# Patient Record
Sex: FEMALE | Race: WHITE | NOT HISPANIC OR LATINO | Employment: UNEMPLOYED | URBAN - METROPOLITAN AREA
[De-identification: names, ages, dates, MRNs, and addresses within clinical notes are randomized per-mention and may not be internally consistent; named-entity substitution may affect disease eponyms.]

---

## 2017-01-17 ENCOUNTER — HOSPITAL ENCOUNTER (EMERGENCY)
Facility: HOSPITAL | Age: 22
Discharge: HOME | End: 2017-01-17
Attending: EMERGENCY MEDICINE
Payer: MEDICAID

## 2017-01-17 VITALS
HEART RATE: 95 BPM | BODY MASS INDEX: 32.78 KG/M2 | RESPIRATION RATE: 14 BRPM | SYSTOLIC BLOOD PRESSURE: 119 MMHG | TEMPERATURE: 99 F | DIASTOLIC BLOOD PRESSURE: 81 MMHG | HEIGHT: 63 IN | OXYGEN SATURATION: 100 % | WEIGHT: 185 LBS

## 2017-01-17 DIAGNOSIS — R07.89 CHEST WALL PAIN: Primary | ICD-10-CM

## 2017-01-17 DIAGNOSIS — M62.838 MUSCLE SPASM: ICD-10-CM

## 2017-01-17 PROCEDURE — 99284 EMERGENCY DEPT VISIT MOD MDM: CPT

## 2017-01-17 RX ORDER — ASCORBIC ACID 500 MG
500 TABLET ORAL DAILY
COMMUNITY
End: 2017-09-12

## 2017-01-17 RX ORDER — CYCLOBENZAPRINE HCL 10 MG
10 TABLET ORAL 3 TIMES DAILY PRN
Qty: 20 TABLET | Refills: 0 | Status: SHIPPED | OUTPATIENT
Start: 2017-01-17 | End: 2017-01-22

## 2017-01-17 RX ORDER — FOLIC ACID 0.8 MG
800 TABLET ORAL DAILY
COMMUNITY
End: 2018-07-27 | Stop reason: ALTCHOICE

## 2017-01-17 RX ORDER — DICLOFENAC SODIUM 30 MG/G
GEL TOPICAL
Qty: 100 G | Refills: 0 | Status: SHIPPED | OUTPATIENT
Start: 2017-01-17 | End: 2017-09-12

## 2017-01-17 RX ORDER — ETODOLAC 300 MG/1
300 CAPSULE ORAL EVERY 8 HOURS PRN
Qty: 30 CAPSULE | Refills: 0 | Status: SHIPPED | OUTPATIENT
Start: 2017-01-17 | End: 2017-09-12

## 2017-01-17 NOTE — ED PROVIDER NOTES
"Encounter Date: 1/17/2017    SCRIBE #1 NOTE: IDiana, am scribing for, and in the presence of, Dr. Hill.       History     Chief Complaint   Patient presents with    Shoulder Pain     Pain R axilla, shoulder since last pm. Denies trauma.     Review of patient's allergies indicates:   Allergen Reactions    Tramadol Other (See Comments)     seizures     HPI Comments: 01/17/2017  5:16 PM     Chief Complaint: Right Shoulder Pain and Right Sided CP      The patient is a 21 y.o. female with a PMHx of asthma and seizures who is presenting via EMS with an acute onset of right shoulder pain and right sided CP that started last night. Her pain is exacerbated with raising her right arm up high. She reported applying an ice pack, without any alleviation. Associated symptom of SOB. Her boyfriend's initial comment was "hey we were about to have sex" upon entering the pt room. Pt has a past surgical history that includes Adenoidectomy w/ myringotomy and tubes.      The history is provided by the patient and a significant other.     Past Medical History   Diagnosis Date    Asthma     Seizures      Epilepsy     No past medical history pertinent negatives.  Past Surgical History   Procedure Laterality Date    Adenoidectomy w/ myringotomy and tubes       Family History   Problem Relation Age of Onset    Heart disease Father     Heart disease Maternal Grandfather     Heart disease Paternal Grandmother     Heart disease Paternal Grandfather      Social History   Substance Use Topics    Smoking status: Never Smoker    Smokeless tobacco: None    Alcohol use No      Comment: rarely     Review of Systems   Constitutional: Negative for fever.   HENT: Negative for sore throat.    Eyes: Negative for visual disturbance.   Respiratory: Positive for shortness of breath.    Cardiovascular: Positive for chest pain (Right sided CP.).   Gastrointestinal: Negative for nausea.   Genitourinary: Negative for dysuria. "   Musculoskeletal: Positive for arthralgias (Right shoulder pain.).   Skin: Negative for rash.   Neurological: Negative for weakness.   Hematological: Does not bruise/bleed easily.   Psychiatric/Behavioral: Negative for confusion.       Physical Exam   Initial Vitals   BP Pulse Resp Temp SpO2   01/17/17 1652 01/17/17 1652 01/17/17 1652 01/17/17 1652 01/17/17 1652   119/81 95 14 98.7 °F (37.1 °C) 100 %     Physical Exam    Nursing note and vitals reviewed.  Constitutional: She appears well-developed and well-nourished.   HENT:   Head: Normocephalic and atraumatic.   Eyes: Conjunctivae are normal.   Neck: Neck supple.   Cardiovascular: Normal rate, regular rhythm, normal heart sounds and intact distal pulses. Exam reveals no gallop and no friction rub.    No murmur heard.  Pulmonary/Chest: Breath sounds normal. No respiratory distress. She has no wheezes. She has no rhonchi. She has no rales. She exhibits tenderness.   Right upper quadrant chest wall TTP, reproducible, with muscle spasms.   Musculoskeletal:   Deltopectoral groove tenderness.  Pain with right shoulder flexion past 90 degrees, abduction to 90 degrees, and extreme extension of right shoulder.  No tenderness to actual right shoulder.   Neurological: She is alert and oriented to person, place, and time.   Skin: No rash noted. No erythema.   Psychiatric: She has a normal mood and affect.         ED Course   Procedures  Labs Reviewed - No data to display                     Scribe Attestation:   Scribe #1: I performed the above scribed service and the documentation accurately describes the services I performed. I attest to the accuracy of the note.    Attending Attestation:           Physician Attestation for Scribe:  Physician Attestation Statement for Scribe #1: I, Dr. Hill, reviewed documentation, as scribed by Diana Cm in my presence, and it is both accurate and complete.         Attending ED Notes:   This is an emergent evaluation of a 21  y.o.female patient with presentation of right shoulder pain/CP consistent with muscle spasm.   Plan: NSAID, muscle relaxer, heat, and discharge.    At the present time, I do not believe the patient has an emergent condition requiring further emergent workup or admission to the hospital at this time. The pt is stable for discharge. I doubt pneumothorax, pleurisy, PNA, and ACS. Pt is PERC negative. Clinical impression/plan and rx discussed with patient. Return precautions and after care instructions given. Pt is to follow up with referred physician in 3 days or return to the ED for any concerns. The patient expressed understanding.            ED Course     Clinical Impression:   The primary encounter diagnosis was Chest wall pain. A diagnosis of Muscle spasm was also pertinent to this visit.          Alexandr Hill MD  01/17/17 [id number]

## 2017-01-17 NOTE — ED AVS SNAPSHOT
[facility] [address] [place] [address]               Syeda Gonzalez   2017  5:06 PM   ED    Description:  Female : 1995   Department:  [facility]           Your Care was Coordinated By:     Provider Role From To    Alexandr Hill MD Attending Provider 17 1087 --      Reason for Visit     Shoulder Pain           Diagnoses this Visit        Comments    Chest wall pain    -  Primary     Muscle spasm           ED Disposition     None           To Do List           Follow-up Information     Follow up with [facility]. Call in 3 days.    Specialty:  Family Medicine    Why:  As needed    Contact information:    [address] [place] [id number] [phone]       These Medications        Disp Refills Start End    diclofenac sodium (SOLARAZE) 3 % gel 100 g 0 2017     Apply to affected area twice daily    Pharmacy: [facility] #[id number] - [address] Ph #: [phone]       etodolac (LODINE) 300 MG Cap 30 capsule 0 2017     Take 1 capsule (300 mg total) by mouth every 8 (eight) hours as needed (pain). - Oral    Pharmacy: [facility]/pharmacy #[id number] - [address] Ph #: [phone]       cyclobenzaprine (FLEXERIL) 10 MG tablet 20 tablet 0 2017    Take 1 tablet (10 mg total) by mouth 3 (three) times daily as needed for Muscle spasms. - Oral    Pharmacy: [facility]/pharmacy #[id number] - [address] Ph #: [phone]         [facility]     [facility] -  Assistance  Registered nurses in the [facility] provide clinical advisement, health education, appointment booking, and other advisory services.  Call for this free service at [phone].             Medications           Message regarding Medications     Verify the changes and/or additions to your medication regime listed below are the same as  "discussed with your clinician today.  If any of these changes or additions are incorrect, please notify your healthcare provider.        START taking these NEW medications        Refills    diclofenac sodium (SOLARAZE) 3 % gel 0    Sig: Apply to affected area twice daily    Class: Print    etodolac (LODINE) 300 MG Cap 0    Sig: Take 1 capsule (300 mg total) by mouth every 8 (eight) hours as needed (pain).    Class: Print    Route: Oral    cyclobenzaprine (FLEXERIL) 10 MG tablet 0    Sig: Take 1 tablet (10 mg total) by mouth 3 (three) times daily as needed for Muscle spasms.    Class: Print    Route: Oral           Verify that the below list of medications is an accurate representation of the medications you are currently taking.  If none reported, the list may be blank. If incorrect, please contact your healthcare provider. Carry this list with you in case of emergency.           Current Medications     ascorbic acid, vitamin C, (VITAMIN C) 500 MG tablet Take 500 mg by mouth once daily.    folic acid (FOLVITE) 800 MCG Tab Take 800 mcg by mouth once daily.    melatonin 1 mg Tab Take by mouth.    zonisamide (ZONEGRAN) 100 MG Cap Take 3 capsules (300 mg total) by mouth every evening. 3 caps PO nightly    cyclobenzaprine (FLEXERIL) 10 MG tablet Take 1 tablet (10 mg total) by mouth 3 (three) times daily as needed for Muscle spasms.    diclofenac sodium (SOLARAZE) 3 % gel Apply to affected area twice daily    etodolac (LODINE) 300 MG Cap Take 1 capsule (300 mg total) by mouth every 8 (eight) hours as needed (pain).           Clinical Reference Information           Your Vitals Were     BP Pulse Temp Resp Height Weight    119/81 (BP Location: Right arm, Patient Position: Sitting) 95 98.7 °F (37.1 °C) (Oral) 14 5' 3" (1.6 m) 83.9 kg (185 lb)    Last Period SpO2 BMI          01/03/2017 100% 32.77 kg/m2        Allergies as of 1/17/2017        Reactions    Tramadol Other (See Comments)    seizures      Immunizations " Administered on Date of Encounter - 1/17/2017     None      ED Micro, Lab, POCT     None      ED Imaging Orders     None        Discharge Instructions         Muscle Spasm  A muscle spasm (also called a cramp) is an involuntary muscle contraction. The muscle tightens quickly and strongly. A hard lump may form in the muscle. Muscle spasms are very painful. Read on to learn more about muscle spasms and how to treat and prevent them.    What causes muscles to spasm?  Often, the cause of a muscle spasm is not known. Muscle spasm is due to irritation of muscle fibers. Some things can make a muscle spasm more likely. These include:  · Injury  · Heavy exercise  · Overtired muscles  · A muscle held in one position for a long time  · Dehydration  · Low levels of certain minerals in the body  · Taking certain medications, such as diuretics or water pills  · Certain medical conditions, such as kidney failure or diabetes  · Being pregnant  Stopping a muscle spasm  Muscle spasms often come and go quickly. When a muscle goes into spasm, very gently stretch and massage the muscle. This may help calm the muscle fibers. Then rest the muscle.  Preventing muscle spasms  Although there is little or no evidence that staying hydrated, taking certain vitamins or minerals or stretching works to prevent cramps, these measures may help and have other benefits. Talk to your health care provider about steps to take to avoid muscle spasms. These may include:  · Drinking enough fluids to avoid dehydration, especially when you exercise.  · Taking vitamin or mineral supplements.  · Getting regular exercise.  · Stretching regularly, especially before exercise.  · Limit caffeine and smoking.  · Taking a prescription muscle relaxant.  When to call your doctor  Call your doctor if you have any of the following:  · Severe cramping  · Cramping that lasts a long time, does not go away with stretching, or keeps coming back  · Pain, tingling, or weakness  in the arms or legs  · Pain that wakes you up at night   © 4074-9480 [employer]. [address] [place] [address], [place] [address]. All rights reserved. This information is not intended as a substitute for professional medical care. Always follow your healthcare professional's instructions.          Your Scheduled Appointments     Feb 17, 2017 10:40 AM CST   Established Patient Visit with Drake Ruff Jr., MD   [place] [address] [facility] [address] [place] [id number] [phone]              [facility] Sign-Up     Activating your [facility] account is as easy as 1-2-3!     1) Visit [email or web address], select Sign Up Now, enter this activation code and your date of birth, then select Next.  [email or web address]  Expires: 3/3/2017  5:24 PM      2) Create a username and password to use when you visit [facility] in the future and select a security question in case you lose your password and select Next.    3) Enter your e-mail address and click Sign Up!    Additional Information  If you have questions, please e-mail [email or web address] or call [phone] to talk to our [facility] staff. Remember, [facility] is NOT to be used for urgent needs. For medical emergencies, dial 911.          [facility] complies with applicable Federal civil rights laws and does not discriminate on the basis of race, color, national origin, age, disability, or sex.        Language Assistance Services     ATTENTION: Language assistance services are available, free of charge. Please call [phone].      ATENCIÓN: Si habla español, tiene a vela disposición servicios gratuitos de asistencia lingüística. Llame al [phone].     CHÚ Ý: N?u b?n nói Ti?ng Vi?t, có các d?ch v? h? tr? ngôn ng? mi?n phí dành cho b?n. G?i s? [phone].

## 2017-01-17 NOTE — DISCHARGE INSTRUCTIONS
Muscle Spasm  A muscle spasm (also called a cramp) is an involuntary muscle contraction. The muscle tightens quickly and strongly. A hard lump may form in the muscle. Muscle spasms are very painful. Read on to learn more about muscle spasms and how to treat and prevent them.    What causes muscles to spasm?  Often, the cause of a muscle spasm is not known. Muscle spasm is due to irritation of muscle fibers. Some things can make a muscle spasm more likely. These include:  · Injury  · Heavy exercise  · Overtired muscles  · A muscle held in one position for a long time  · Dehydration  · Low levels of certain minerals in the body  · Taking certain medications, such as diuretics or water pills  · Certain medical conditions, such as kidney failure or diabetes  · Being pregnant  Stopping a muscle spasm  Muscle spasms often come and go quickly. When a muscle goes into spasm, very gently stretch and massage the muscle. This may help calm the muscle fibers. Then rest the muscle.  Preventing muscle spasms  Although there is little or no evidence that staying hydrated, taking certain vitamins or minerals or stretching works to prevent cramps, these measures may help and have other benefits. Talk to your health care provider about steps to take to avoid muscle spasms. These may include:  · Drinking enough fluids to avoid dehydration, especially when you exercise.  · Taking vitamin or mineral supplements.  · Getting regular exercise.  · Stretching regularly, especially before exercise.  · Limit caffeine and smoking.  · Taking a prescription muscle relaxant.  When to call your doctor  Call your doctor if you have any of the following:  · Severe cramping  · Cramping that lasts a long time, does not go away with stretching, or keeps coming back  · Pain, tingling, or weakness in the arms or legs  · Pain that wakes you up at night   © 8544-8004 [employer]. [address] [place] [address], [place] [address]. All rights  reserved. This information is not intended as a substitute for professional medical care. Always follow your healthcare professional's instructions.

## 2017-05-12 ENCOUNTER — HOSPITAL ENCOUNTER (EMERGENCY)
Facility: HOSPITAL | Age: 22
Discharge: HOME | End: 2017-05-12
Attending: EMERGENCY MEDICINE
Payer: MEDICAID

## 2017-05-12 VITALS
RESPIRATION RATE: 18 BRPM | DIASTOLIC BLOOD PRESSURE: 75 MMHG | HEART RATE: 85 BPM | TEMPERATURE: 97 F | HEIGHT: 63 IN | WEIGHT: 180 LBS | OXYGEN SATURATION: 100 % | BODY MASS INDEX: 31.89 KG/M2 | SYSTOLIC BLOOD PRESSURE: 121 MMHG

## 2017-05-12 DIAGNOSIS — K21.9 GASTROESOPHAGEAL REFLUX DISEASE, ESOPHAGITIS PRESENCE NOT SPECIFIED: ICD-10-CM

## 2017-05-12 DIAGNOSIS — R10.13 EPIGASTRIC PAIN: Primary | ICD-10-CM

## 2017-05-12 DIAGNOSIS — D64.9 ANEMIA, UNSPECIFIED: ICD-10-CM

## 2017-05-12 LAB
ALBUMIN SERPL BCP-MCNC: 3.7 G/DL
ALP SERPL-CCNC: 76 U/L
ALT SERPL W/O P-5'-P-CCNC: 17 U/L
ANION GAP SERPL CALC-SCNC: 8 MMOL/L
AST SERPL-CCNC: 17 U/L
B-HCG UR QL: NEGATIVE
BASOPHILS # BLD AUTO: 0 K/UL
BASOPHILS NFR BLD: 0.4 %
BILIRUB SERPL-MCNC: 0.4 MG/DL
BILIRUB UR QL STRIP: NEGATIVE
BUN SERPL-MCNC: 13 MG/DL
CALCIUM SERPL-MCNC: 8.9 MG/DL
CHLORIDE SERPL-SCNC: 111 MMOL/L
CLARITY UR: CLEAR
CO2 SERPL-SCNC: 22 MMOL/L
COLOR UR: YELLOW
CREAT SERPL-MCNC: 0.7 MG/DL
CTP QC/QA: YES
DIFFERENTIAL METHOD: ABNORMAL
EOSINOPHIL # BLD AUTO: 0.2 K/UL
EOSINOPHIL NFR BLD: 3.1 %
ERYTHROCYTE [DISTWIDTH] IN BLOOD BY AUTOMATED COUNT: 13.1 %
EST. GFR  (AFRICAN AMERICAN): >60 ML/MIN/1.73 M^2
EST. GFR  (NON AFRICAN AMERICAN): >60 ML/MIN/1.73 M^2
GLUCOSE SERPL-MCNC: 94 MG/DL
GLUCOSE UR QL STRIP: NEGATIVE
HCT VFR BLD AUTO: 35.2 %
HGB BLD-MCNC: 11.8 G/DL
HGB UR QL STRIP: NEGATIVE
KETONES UR QL STRIP: NEGATIVE
LEUKOCYTE ESTERASE UR QL STRIP: NEGATIVE
LIPASE SERPL-CCNC: 36 U/L
LYMPHOCYTES # BLD AUTO: 2 K/UL
LYMPHOCYTES NFR BLD: 29.2 %
MCH RBC QN AUTO: 28.3 PG
MCHC RBC AUTO-ENTMCNC: 33.5 %
MCV RBC AUTO: 85 FL
MONOCYTES # BLD AUTO: 0.7 K/UL
MONOCYTES NFR BLD: 9.8 %
NEUTROPHILS # BLD AUTO: 3.8 K/UL
NEUTROPHILS NFR BLD: 57.5 %
NITRITE UR QL STRIP: NEGATIVE
PH UR STRIP: 7 [PH] (ref 5–8)
PLATELET # BLD AUTO: 288 K/UL
PMV BLD AUTO: 8.1 FL
POTASSIUM SERPL-SCNC: 3.9 MMOL/L
PROT SERPL-MCNC: 6.8 G/DL
PROT UR QL STRIP: NEGATIVE
RBC # BLD AUTO: 4.16 M/UL
SODIUM SERPL-SCNC: 141 MMOL/L
SP GR UR STRIP: 1.01 (ref 1–1.03)
URN SPEC COLLECT METH UR: NORMAL
UROBILINOGEN UR STRIP-ACNC: NEGATIVE EU/DL
WBC # BLD AUTO: 6.7 K/UL

## 2017-05-12 PROCEDURE — 80053 COMPREHEN METABOLIC PANEL: CPT

## 2017-05-12 PROCEDURE — 25000003 PHARM REV CODE 250: Performed by: EMERGENCY MEDICINE

## 2017-05-12 PROCEDURE — 85025 COMPLETE CBC W/AUTO DIFF WBC: CPT

## 2017-05-12 PROCEDURE — 96374 THER/PROPH/DIAG INJ IV PUSH: CPT

## 2017-05-12 PROCEDURE — 81003 URINALYSIS AUTO W/O SCOPE: CPT

## 2017-05-12 PROCEDURE — 81025 URINE PREGNANCY TEST: CPT | Performed by: EMERGENCY MEDICINE

## 2017-05-12 PROCEDURE — 63600175 PHARM REV CODE 636 W HCPCS: Performed by: EMERGENCY MEDICINE

## 2017-05-12 PROCEDURE — 83690 ASSAY OF LIPASE: CPT

## 2017-05-12 PROCEDURE — 36415 COLL VENOUS BLD VENIPUNCTURE: CPT

## 2017-05-12 PROCEDURE — 99284 EMERGENCY DEPT VISIT MOD MDM: CPT | Mod: 25

## 2017-05-12 RX ORDER — OMEPRAZOLE 20 MG/1
20 CAPSULE, DELAYED RELEASE ORAL DAILY
Qty: 30 CAPSULE | Refills: 2 | Status: SHIPPED | OUTPATIENT
Start: 2017-05-12 | End: 2017-09-12

## 2017-05-12 RX ORDER — ONDANSETRON 2 MG/ML
4 INJECTION INTRAMUSCULAR; INTRAVENOUS
Status: COMPLETED | OUTPATIENT
Start: 2017-05-12 | End: 2017-05-12

## 2017-05-12 RX ORDER — ONDANSETRON 4 MG/1
4 TABLET, FILM COATED ORAL EVERY 8 HOURS PRN
Qty: 12 TABLET | Refills: 0 | Status: SHIPPED | OUTPATIENT
Start: 2017-05-12 | End: 2017-05-16

## 2017-05-12 RX ADMIN — ONDANSETRON 4 MG: 2 INJECTION INTRAMUSCULAR; INTRAVENOUS at 10:05

## 2017-05-12 RX ADMIN — SODIUM CHLORIDE 1000 ML: 0.9 INJECTION, SOLUTION INTRAVENOUS at 10:05

## 2017-05-12 RX ADMIN — LIDOCAINE HYDROCHLORIDE: 20 SOLUTION ORAL; TOPICAL at 10:05

## 2017-05-12 NOTE — DISCHARGE INSTRUCTIONS
Tips to Control Acid Reflux    To control acid reflux, youll need to make some basic diet and lifestyle changes. The simple steps outlined below may be all youll need to ease discomfort.  Watch what you eat  · Avoid fatty foods and spicy foods.  · Eat fewer acidic foods, such as citrus and tomato-based foods. These can increase symptoms.  · Limit drinking alcohol, caffeine, and fizzy beverages. All increase acid reflux.  · Try limiting chocolate, peppermint, and spearmint. These can worsen acid reflux in some people.  Watch when you eat  · Avoid lying down for 3 hours after eating.  · Do not snack before going to bed.  Raise your head  Raising your head and upper body by 4 to 6 inches helps limit reflux when youre lying down. Put blocks under the head of your bed frame to raise it.  Other changes  · Lose weight, if you need to  · Dont exercise near bedtime  · Avoid tight-fitting clothes  · Limit aspirin and ibuprofen  · Stop smoking   Date Last Reviewed: 7/1/2016 © 2000-2016 [employer]. [address] [place] [address]. All rights reserved. This information is not intended as a substitute for professional medical care. Always follow your healthcare professional's instructions.          Epigastric Pain (Uncertain Cause)    Epigastric pain can be a sign of disease in the upper abdomen. Common causes include:  · Acid reflux (stomach acid flowing up into the esophagus)  · Gastritis (irritation of the stomach lining)  · Peptic Ulcer Disease  · Inflammation of the pancreas  · Gallstone  · Infection in the gallbladder  Pain may be dull or burning. It may spread upward to the chest or to the back. There may be other symptoms such as belching, bloating, cramps or hunger pains. There may be weight loss or poor appetite, nausea or vomiting.  Since the diagnosis of your pain is not certain yet, further tests may sometimes be needed. Sometimes the doctor will treat you for the most likely condition  to see if there is improvement before doing further tests.  Home care  Medicines  · Antacids help neutralize the normal acids in your stomach. Examples are Maalox, Mylanta, Rolaids, and Tums. If you dont like the liquid, you can also try a chewable one. You may find one works better than another for you. Overuse can cause diarrhea or constipation.  · Acid blockers (H2 blockers) decrease acid production. Examples are cimetidine (Tagamet), famotidine (Pepcid) and ranitidine (Zantac).  · Acid inhibitors (PPIs) decrease acid production in a different way than the blockers. You may find they work better, but can take a little longer to take effect.  Examples are omeprazole (Prilosec), lansoprazole (Prevacid), pantoprazole (Protonix), rabeprazole (Aciphex), and esomeprazole (Nexium).  · Take an antacid 30-60 minutes after eating and at bedtime, but not at the same time as an acid blocker.  · Try not to take NSAIDs. Aspirin may also cause problems, but if taking it for your heart or other medical reasons, talk to your doctor before stopping it; you do not want to cause a worse problem, like a heart attack or stroke.  Diet  · If certain foods seem to cause your spasm, try to avoid them.   · Eat slowly and chew food well before swallowing. Symptoms of gastritis can be worsened by certain foods. Limit or avoid fatty, fried, and spicy foods, as well as coffee, chocolate, mint, and foods with high acid content such as tomatoes and citrus fruit and juices (orange, grapefruit, lemon).  · Avoid alcohol, caffeine, and tobacco, which can delay healing and worsen your problem.  · Try eating smaller meals with snacks in between  Follow-up care  Follow up with your healthcare provider or as advised.  When to seek medical advice  Call your healthcare provider right away if any of the following occur:  · Stomach pain worsens or moves to the right lower part of the abdomen  · Chest pain appears, or if it worsens or spreads to the chest,  back, neck, shoulder, or arm  · Frequent vomiting (cant keep down liquids)  · Blood in the stool or vomit (red or black color)  · Feeling weak or dizzy, fainting, or having trouble breathing  · Fever of 100.4ºF (38ºC) or higher, or as directed by your healthcare provider  · Abdominal swelling  Date Last Reviewed: 9/25/2015  © 9904-7771 [employer]. [address] [place] [address], [place] [id number]. All rights reserved. This information is not intended as a substitute for professional medical care. Always follow your healthcare professional's instructions.

## 2017-05-12 NOTE — ED PROVIDER NOTES
Encounter Date: 5/12/2017       History     Chief Complaint   Patient presents with    Abdominal Pain     epigastric pain - atraumatic - denies bowel or bladder changes - s/s x 3-4 days     Review of patient's allergies indicates:   Allergen Reactions    Tramadol Other (See Comments)     seizures     HPI Comments: This patient's 22-year-old female presenting to the emergency department with complaints of epigastric, crampy pain, associated with nausea without vomiting or diarrhea that has been intermittently present over the past 2-3 days.  She denies previous history of PUD, pancreatitis, hepatitis, pyelonephritis, recent alcohol use.  She reports previous history of cholecystectomy.  She denies taking anything for symptom improvement prior to arrival.  She additionally denies recent fever, abdominal bloating or rigidity, lower quadrant tenderness, risk for sexually transmitted infections.  She does endorse a past history of reflux disease but reports she has not taken medication for this for several months.    The history is provided by the patient.     Past Medical History:   Diagnosis Date    Asthma     Seizures     Epilepsy     Past Surgical History:   Procedure Laterality Date    ADENOIDECTOMY W/ MYRINGOTOMY AND TUBES       Family History   Problem Relation Age of Onset    Heart disease Father     Heart disease Maternal Grandfather     Heart disease Paternal Grandmother     Heart disease Paternal Grandfather      Social History   Substance Use Topics    Smoking status: Never Smoker    Smokeless tobacco: None    Alcohol use No      Comment: rarely     Review of Systems   Constitutional: Negative for fever.   HENT: Negative for sore throat.    Eyes: Negative for visual disturbance.   Respiratory: Negative for shortness of breath.    Cardiovascular: Negative for chest pain.   Gastrointestinal: Positive for abdominal pain.   Genitourinary: Negative for dysuria.   Musculoskeletal: Negative for back  pain.   Skin: Negative for rash.   Psychiatric/Behavioral: Negative for confusion.       Physical Exam   Initial Vitals   BP Pulse Resp Temp SpO2   05/12/17 0804 05/12/17 0804 05/12/17 0804 05/12/17 0804 05/12/17 0804   121/75 85 18 97.4 °F (36.3 °C) 100 %     Physical Exam    Nursing note and vitals reviewed.  Constitutional: She appears well-developed. No distress.   HENT:   Head: Normocephalic and atraumatic.   Mouth/Throat: Oropharynx is clear and moist.   Eyes: Conjunctivae and EOM are normal. No scleral icterus.   Neck: Normal range of motion. Neck supple.   Cardiovascular: Normal rate and regular rhythm.   Pulmonary/Chest: Breath sounds normal. No respiratory distress. She has no wheezes.   Abdominal: Soft. Bowel sounds are normal. She exhibits no distension and no mass. There is tenderness. There is no rebound and no guarding.   Minimal epigastric tenderness with palpation, abdomen is nonrigid, nondistended, nonsurgical, no lower quadrant tenderness, including adnexal tenderness, no posterior flank pain or overlying skin changes   Musculoskeletal: She exhibits no edema or tenderness.   Neurological: She is alert and oriented to person, place, and time.   Skin: Skin is warm and dry. No rash noted.   Psychiatric: Thought content normal.         ED Course   Procedures  Labs Reviewed - No data to display          Medical Decision Making:   Initial Assessment:   Patient was interviewed and examined shortly after arrival and found to be in no acute distress.  At this time she has a nonsurgical abdominal examination.  Her reports of intermittent crampy pain associated with dyspepsia is concerning for recurrence of gastritis.  Laboratory analyses, including radiographic urinalysis will be obtained to rule out evidence of underlying perforation, free air, anemia associated with a slowly bleeding ulcer, abnormal pancreatic or liver function.  Differential Diagnosis:   DDX include, but are not limited to, gastritis,  pancreatitis, PUD, perforation, hepatitis, splenic enlargement, obstruction, ileus, constipation, gastroenteritis, appendicitis, STI  ED Management:  Screening lab work indicates mild stable anemia with a hemoglobin of 11.8.  X-ray show evidence of prior cholecystectomy without other additional concerning findings.  On reassessment, she denied any ongoing pain, nausea and is currently requesting discharge.  I do think she is currently stable for discharge but was educated about GERD and will be sent home with a PPI and bentyl.  She is asked to maintain a bland diet over the next few days and stay well-hydrated.  She is asked to follow-up with her primary care doctor as soon as possible or to return to the ER for any new, concerning, or worsening symptoms, including worsening abdominal pain or fever.  The patient was agreeable with this plan for follow-up she was discharged in stable condition.                   ED Course     Clinical Impression:   The primary encounter diagnosis was Epigastric pain. Diagnoses of Gastroesophageal reflux disease, esophagitis presence not specified and Anemia, unspecified were also pertinent to this visit.      Disposition:   Disposition: Discharged  Condition: Stable       Paco Rod MD  05/12/17 7758

## 2017-05-12 NOTE — ED AVS SNAPSHOT
[facility] [address] [place] [address]               Syeda Gonzalez   2017  8:09 AM   ED    Description:  Female : 1995   Department:  [facility]           Your Care was Coordinated By:     Provider Role From To    Paco Rod MD Attending Provider 17 0821 --      Reason for Visit     Abdominal Pain           Diagnoses this Visit        Comments    Epigastric pain    -  Primary Resolved    Gastroesophageal reflux disease, esophagitis presence not specified           ED Disposition     None           To Do List           Follow-up Information     Schedule an appointment as soon as possible for a visit with [facility].    Specialty:  Family Medicine    Contact information:    [address]  [facility] [id number] [phone]        Follow up with [facility].    Specialty:  Emergency Medicine    Why:  If symptoms worsen    Contact information:    [address] [place] [id number] [phone]      [facility]     [facility] Care Line -  Assistance  Unless otherwise directed by your provider, please contact [facility], our nurse care line that is available for  assistance.     Registered nurses in the [facility] provide: appointment scheduling, clinical advisement, health education, and other advisory services.  Call: [phone] (toll free)               Medications           Message regarding Medications     Verify the changes and/or additions to your medication regime listed below are the same as discussed with your clinician today.  If any of these changes or additions are incorrect, please notify your healthcare provider.        These medications were administered today        Dose Freq    sodium chloride 0.9% bolus 1,000 mL 1,000 mL Once    Sig: Inject 1,000 mLs into the vein once.    Class: Normal     "Route: Intravenous    ondansetron injection 4 mg 4 mg ED 1 Time    Sig: Inject 4 mg into the vein ED 1 Time.    Class: Normal    Route: Intravenous    (pyxis) gi cocktail (mylanta 30 mL, lidocaine 2 % viscous 10 mL, dicyclomine 10 mL) 50 mL  ED 1 Time    Sig: Take by mouth ED 1 Time.    Class: Normal    Route: Oral           Verify that the below list of medications is an accurate representation of the medications you are currently taking.  If none reported, the list may be blank. If incorrect, please contact your healthcare provider. Carry this list with you in case of emergency.           Current Medications     ascorbic acid, vitamin C, (VITAMIN C) 500 MG tablet Take 500 mg by mouth once daily.    diclofenac sodium (SOLARAZE) 3 % gel Apply to affected area twice daily    etodolac (LODINE) 300 MG Cap Take 1 capsule (300 mg total) by mouth every 8 (eight) hours as needed (pain).    folic acid (FOLVITE) 800 MCG Tab Take 800 mcg by mouth once daily.    melatonin 1 mg Tab Take by mouth.    zonisamide (ZONEGRAN) 100 MG Cap Take 3 capsules (300 mg total) by mouth every evening. 3 caps PO nightly           Clinical Reference Information           Your Vitals Were     BP Pulse Temp Resp Height Weight    121/75 (BP Location: Left arm, Patient Position: Sitting) 85 97.4 °F (36.3 °C) (Oral) 18 5' 3" (1.6 m) 81.6 kg (180 lb)    SpO2 BMI             100% 31.89 kg/m2         Allergies as of 5/12/2017        Reactions    Tramadol Other (See Comments)    seizures      Immunizations Administered on Date of Encounter - 5/12/2017     None      ED Micro, Lab, POCT     Start Ordered       Status Ordering Provider    05/12/17 0840 05/12/17 0840  CBC W/ AUTO DIFFERENTIAL  STAT      Final result     05/12/17 0840 05/12/17 0840  Comp. Metabolic Panel  STAT      Final result     05/12/17 0840 05/12/17 0840  Lipase  Once      Final result     05/12/17 0840 05/12/17 0840  Urinalysis - Clean Catch  STAT      Final result     05/12/17 0840 " 05/12/17 0840  POCT urine pregnancy  Once      Final result       ED Imaging Orders     Start Ordered       Status Ordering Provider    05/12/17 0840 05/12/17 0840  X-Ray Abdomen Flat And Erect  1 time imaging      Final result         Discharge Instructions         Tips to Control Acid Reflux    To control acid reflux, youll need to make some basic diet and lifestyle changes. The simple steps outlined below may be all youll need to ease discomfort.  Watch what you eat  · Avoid fatty foods and spicy foods.  · Eat fewer acidic foods, such as citrus and tomato-based foods. These can increase symptoms.  · Limit drinking alcohol, caffeine, and fizzy beverages. All increase acid reflux.  · Try limiting chocolate, peppermint, and spearmint. These can worsen acid reflux in some people.  Watch when you eat  · Avoid lying down for 3 hours after eating.  · Do not snack before going to bed.  Raise your head  Raising your head and upper body by 4 to 6 inches helps limit reflux when youre lying down. Put blocks under the head of your bed frame to raise it.  Other changes  · Lose weight, if you need to  · Dont exercise near bedtime  · Avoid tight-fitting clothes  · Limit aspirin and ibuprofen  · Stop smoking   Date Last Reviewed: 7/1/2016  © 3992-5222 [employer] [address] [place] [address], [place] [address]. All rights reserved. This information is not intended as a substitute for professional medical care. Always follow your healthcare professional's instructions.          Epigastric Pain (Uncertain Cause)    Epigastric pain can be a sign of disease in the upper abdomen. Common causes include:  · Acid reflux (stomach acid flowing up into the esophagus)  · Gastritis (irritation of the stomach lining)  · Peptic Ulcer Disease  · Inflammation of the pancreas  · Gallstone  · Infection in the gallbladder  Pain may be dull or burning. It may spread upward to the chest or to the back. There may be other symptoms such as  belching, bloating, cramps or hunger pains. There may be weight loss or poor appetite, nausea or vomiting.  Since the diagnosis of your pain is not certain yet, further tests may sometimes be needed. Sometimes the doctor will treat you for the most likely condition to see if there is improvement before doing further tests.  Home care  Medicines  · Antacids help neutralize the normal acids in your stomach. Examples are Maalox, Mylanta, Rolaids, and Tums. If you dont like the liquid, you can also try a chewable one. You may find one works better than another for you. Overuse can cause diarrhea or constipation.  · Acid blockers (H2 blockers) decrease acid production. Examples are cimetidine (Tagamet), famotidine (Pepcid) and ranitidine (Zantac).  · Acid inhibitors (PPIs) decrease acid production in a different way than the blockers. You may find they work better, but can take a little longer to take effect.  Examples are omeprazole (Prilosec), lansoprazole (Prevacid), pantoprazole (Protonix), rabeprazole (Aciphex), and esomeprazole (Nexium).  · Take an antacid 30-60 minutes after eating and at bedtime, but not at the same time as an acid blocker.  · Try not to take NSAIDs. Aspirin may also cause problems, but if taking it for your heart or other medical reasons, talk to your doctor before stopping it; you do not want to cause a worse problem, like a heart attack or stroke.  Diet  · If certain foods seem to cause your spasm, try to avoid them.   · Eat slowly and chew food well before swallowing. Symptoms of gastritis can be worsened by certain foods. Limit or avoid fatty, fried, and spicy foods, as well as coffee, chocolate, mint, and foods with high acid content such as tomatoes and citrus fruit and juices (orange, grapefruit, lemon).  · Avoid alcohol, caffeine, and tobacco, which can delay healing and worsen your problem.  · Try eating smaller meals with snacks in between  Follow-up care  Follow up with your  healthcare provider or as advised.  When to seek medical advice  Call your healthcare provider right away if any of the following occur:  · Stomach pain worsens or moves to the right lower part of the abdomen  · Chest pain appears, or if it worsens or spreads to the chest, back, neck, shoulder, or arm  · Frequent vomiting (cant keep down liquids)  · Blood in the stool or vomit (red or black color)  · Feeling weak or dizzy, fainting, or having trouble breathing  · Fever of 100.4ºF (38ºC) or higher, or as directed by your healthcare provider  · Abdominal swelling  Date Last Reviewed: 9/25/2015  © 0891-5658 [employer]. [address] [place] [address], [place] [address]. All rights reserved. This information is not intended as a substitute for professional medical care. Always follow your healthcare professional's instructions.          [facility] Sign-Up     Activating your [facility] account is as easy as 1-2-3!     1) Visit [email or web address], select Sign Up Now, enter this activation code and your date of birth, then select Next.  [email or web address]  Expires: 6/26/2017 11:49 AM      2) Create a username and password to use when you visit [facility] in the future and select a security question in case you lose your password and select Next.    3) Enter your e-mail address and click Sign Up!    Additional Information  If you have questions, please e-mail [email or web address] or call [phone] to talk to our [facility] staff. Remember, [facility] is NOT to be used for urgent needs. For medical emergencies, dial 911.          [facility] complies with applicable Federal civil rights laws and does not discriminate on the basis of race, color, national origin, age, disability, or sex.        Language Assistance Services     ATTENTION: Language assistance services are available, free of charge. Please call [phone].      ATENCIÓN: Si habla español, tiene a vela disposición servicios gratuitos de  asistencia lingüística. [facility] [phone].     RYAN Ý: N?u b?n nói Ti?ng Vi?t, có các d?ch v? h? tr? ngôn ng? mi?n phí demetrioh cho b?n. G?i s? [phone].

## 2017-08-25 ENCOUNTER — TELEPHONE (OUTPATIENT)
Dept: NEUROLOGY | Facility: CLINIC | Age: 22
End: 2017-08-25

## 2017-08-25 NOTE — TELEPHONE ENCOUNTER
The patient has been on Zonegran for months now and is starting to have weird dreams the last three nights.   She feels like she is dreaming and is answering herself. in her dream. When she starts to answer herself she wakes up and is either standing on side of her bed or sitting up with her feet hanging over the bed.  She is not sure if she is doing something else in her dream that she cannot remember.  She is in the room with her three year old.  The events are scaring her.

## 2017-08-25 NOTE — TELEPHONE ENCOUNTER
----- Message from Raquel Schmidt sent at 8/24/2017  2:53 PM CDT -----  Contact: patient  Patient calling in regards to having weird dreams and side effects from the medication. She wants to speak with the Nurse. Please advise.  Call back   Thanks!

## 2017-08-25 NOTE — TELEPHONE ENCOUNTER
I haven't seen her in over a year.  If she has been on Zonegran since that time and the events started 3 nights ago, I think that it is unlikely that it is related to this medication.

## 2017-08-25 NOTE — TELEPHONE ENCOUNTER
She does not take any other medication.   What would you advise she does.  Encouraged the patient to see PCP. Given number for the [facility] Medicaid hot line.

## 2017-09-05 ENCOUNTER — TELEPHONE (OUTPATIENT)
Dept: NEUROLOGY | Facility: CLINIC | Age: 22
End: 2017-09-05

## 2017-09-05 DIAGNOSIS — G40.109 LOCALIZATION-RELATED EPILEPSY: ICD-10-CM

## 2017-09-05 NOTE — TELEPHONE ENCOUNTER
----- Message from Elizabeth Storey sent at 9/5/2017  2:32 PM CDT -----  Contact: patient  Patient called requesting refill on seizure medication. Send to [employer] . Please call back at [phone] when script has been sent. Thanks,

## 2017-09-06 RX ORDER — ZONISAMIDE 100 MG/1
300 CAPSULE ORAL NIGHTLY
Qty: 90 CAPSULE | Refills: 0 | Status: SHIPPED | OUTPATIENT
Start: 2017-09-06 | End: 2017-09-21 | Stop reason: SDUPTHER

## 2017-09-06 NOTE — TELEPHONE ENCOUNTER
Called to schedule appt, no answer, left message to call back to schedule appt for further refills. Recall letter sent as well.

## 2017-09-07 ENCOUNTER — TELEPHONE (OUTPATIENT)
Dept: NEUROLOGY | Facility: CLINIC | Age: 22
End: 2017-09-07

## 2017-09-07 DIAGNOSIS — G40.109 LOCALIZATION-RELATED EPILEPSY: ICD-10-CM

## 2017-09-07 RX ORDER — ZONISAMIDE 100 MG/1
CAPSULE ORAL
Qty: 90 CAPSULE | Refills: 2 | OUTPATIENT
Start: 2017-09-07

## 2017-09-07 NOTE — TELEPHONE ENCOUNTER
----- Message from Didi Tijerina sent at 9/6/2017  2:23 PM CDT -----  Pt returning call  [phone] returning call about an appointment .. For med refills

## 2017-09-11 ENCOUNTER — TELEPHONE (OUTPATIENT)
Dept: NEUROLOGY | Facility: CLINIC | Age: 22
End: 2017-09-11

## 2017-09-11 NOTE — TELEPHONE ENCOUNTER
----- Message from Nichol Hunt sent at 9/8/2017 12:37 PM CDT -----  Contact: Syeda  Amrik is calling regarding appointment. Did let know Rx was sent 9/6/17. Please call [phone] (new number). Thanks!

## 2017-09-12 ENCOUNTER — OFFICE VISIT (OUTPATIENT)
Dept: NEUROLOGY | Facility: CLINIC | Age: 22
End: 2017-09-12
Payer: MEDICAID

## 2017-09-12 VITALS
BODY MASS INDEX: 32.68 KG/M2 | HEIGHT: 63 IN | WEIGHT: 184.44 LBS | HEART RATE: 84 BPM | SYSTOLIC BLOOD PRESSURE: 103 MMHG | RESPIRATION RATE: 20 BRPM | DIASTOLIC BLOOD PRESSURE: 66 MMHG

## 2017-09-12 DIAGNOSIS — G40.209 PARTIAL SYMPTOMATIC EPILEPSY WITH COMPLEX PARTIAL SEIZURES, NOT INTRACTABLE, WITHOUT STATUS EPILEPTICUS: Primary | ICD-10-CM

## 2017-09-12 PROCEDURE — 99999 PR PBB SHADOW E&M-EST. PATIENT-LVL III: CPT | Mod: PBBFAC,,, | Performed by: PSYCHIATRY & NEUROLOGY

## 2017-09-12 PROCEDURE — 3008F BODY MASS INDEX DOCD: CPT | Mod: ,,, | Performed by: PSYCHIATRY & NEUROLOGY

## 2017-09-12 PROCEDURE — 99214 OFFICE O/P EST MOD 30 MIN: CPT | Mod: S$PBB,,, | Performed by: PSYCHIATRY & NEUROLOGY

## 2017-09-12 PROCEDURE — 99213 OFFICE O/P EST LOW 20 MIN: CPT | Mod: PBBFAC,PN | Performed by: PSYCHIATRY & NEUROLOGY

## 2017-09-12 NOTE — PROGRESS NOTES
"Date of service:  2017    Chief complaint:  Seizures    Interval history:  The patient is a 22 y.o. female seen previously for seizures.  She has had no seizures since our last visit, which was over a year ago.  She has been taking Zonegran 300 mg QHS.  She has noted some issues with word finding, but she feels like this is manageable for her.  She also reports that in recent weeks she has started sleep walking.    History of present illness:  The patient is a 22 y.o. female referred for evaluation of episodes suspicious for seizures.  She has seen Dr. St.  This is my first time seeing her.     She gives no clear history of aura.  She does report having felt abnormally cold throughout the day leading up to her seizures, though.  Her seizure is characterized by a loss of consciousness and reported convulsive activity.   She did have tongue biting on the sides.  This component of this spell lasted for what sounds to be a matter of minutes and occurred back to back.  Afterwards, she had fatigue/confusion.  The patient had, she believes, 3-4 events on the day of her hospitalization in May.  She did have a seizure about 4 years ago while on Ultram.  Also, she had some episode with loss of consciousness and convulsions during a blood draw about 3 years ago.    The patient has a possible family history of seizures ("I heard that my great-great-grandmother had epilepsy."  Also seizures in 2 distant cousins who happen to be on different sides of the family.).  She reports no history of prenatal/ complications. There is no history of febrile seizures.  She notes no history of CNS infections. She claims no history of significant head trauma. There is no history of developmental delay.    Current AEDs:  Zonegran    Prior AEDs:  Lamictal    Prior HPI from Dr. St ():  "Patient is a RH 21 y.o. female presents with seizure activity. Pt with hx of past seizure precipitated from use of tramadol. On day " "of admission brought in with seizure activity, had second seizure event in ED, + tongue biting, no loss of bladder. No hx of head trauma, meningitis or febrile seizures. No illicit drug use or excessive alcohol. 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   Spoke with pt's matthew; she had two previous seizures, one from tramadol, one while giving blood. Recently has had some stressors, poor sleep. No new medications"      Past Medical History:   Diagnosis Date    Asthma     Seizures     Epilepsy       Past Surgical History:   Procedure Laterality Date    ADENOIDECTOMY W/ MYRINGOTOMY AND TUBES      CHOLECYSTECTOMY         Family History   Problem Relation Age of Onset    Heart disease Father     Heart disease Maternal Grandfather     Heart disease Paternal Grandmother     Heart disease Paternal Grandfather        Social history:  Social History     Social History    Marital status: Single     Spouse name: N/A    Number of children: N/A    Years of education: N/A     Occupational History    Not on file.     Social History Main Topics    Smoking status: Never Smoker    Smokeless tobacco: Never Used    Alcohol use Yes      Comment: rarely    Drug use: No    Sexual activity: Yes     Partners: Male     Birth control/ protection: None      Comment:       Other Topics Concern    Not on file     Social History Narrative    No narrative on file      Review of Systems   General/Constitutional:  No unintentional weight loss, + change in appetite  Eyes/Vision:  + change in vision, + double vision  ENT:  No frequent nose bleeds, No ringing in the ears  Respiratory:  No cough, No wheezing  Cardiovascular:  + chest pain, + palpitations  Gastrointestinal:  No jaundice, + nausea/vomiting  Genitourinary:  No incontinence, No burning with urination  Hematologic/Lymphatic:  No easy bruising/bleeding, No night sweats  Neurological:  No numbness, + weakness  Endocrine:  + fatigue, + heat/cold " "intolerance  Allergy/Immunologic:  No fevers, + chills  Musculoskeletal:  + muscle pain, No joint pain   Psychiatric:  No thoughts of harming self/others, + depression  Integumentary:  No rashes, No sores that do not heal       Physical exam:  /66 (BP Location: Left arm, Patient Position: Sitting, BP Method: Large (Automatic))   Pulse 84   Resp 20   Ht 5' 3" (1.6 m)   Wt 83.7 kg (184 lb 6.6 oz)   LMP 08/13/2017 (Within Days)   BMI 32.67 kg/m²   General: Well developed, obese.  No acute distress.  HEENT: Atraumatic, normocephalic.  Neck: Supple, trachea midline.  Cardiovascular: Regular rate and rhythm.  Pulmonary: No increased work of breathing.  Abdomen/GI: No guarding.  Musculoskeletal: No obvious joint deformities, moves all extremities well.    Neurological exam:  Mental status: Awake and alert.  Oriented x4.  Speech fluent and appropriate.  Recent and remote memory appear to be intact.  Fund of knowledge normal.  Cranial nerves: Pupils equal round and reactive to light, extraocular movements intact, facial strength and sensation intact bilaterally, palate and tongue midline, hearing grossly intact bilaterally.  Motor: 5 out of 5 strength throughout the upper and lower extremities bilaterally. Normal bulk and tone.  Sensation: Intact to light touch and temperature bilaterally.  DTR: 2+ at the knees and biceps bilaterally.  Coordination: Finger-nose-finger testing intact bilaterally.  Gait: Normal gait. Good tandem.    Data base:  Notes of the referring physician were reviewed.  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    Labs (5/16):  CMP- unremarkable  CBC- unremarkable    MRI brain (5/16):  "Normal MRI of the brain with and without gadolinium"    EEG (5/16):  "IMPRESSION: This is an abnormal EEG during wakefulness, drowsiness and sleep. Independent left and right temporal focal slowing was noted. In addition, independent left and right " "temporal focal sharp waves were noted.  CLINICAL CORRELATION: The patient is a 21-year-old female with a history of seizures who is currently maintained on Keppra. This is an abnormal EEG during wakefulness, drowsiness and sleep. The presence of independent left and right temporal focal slowing is suggestive of focal neuronal dysfunction in these regions. The presence of independent left and right temporal sharp waves confers an increased risk of focal seizures from both temporal lobes. During this study, no seizures were recorded."    Assessment and plan:  The patient is a 22 y.o. female who presents for follow up on seizures and complaints of suspected medication side effects. I suspect that her events represent partial onset seizures with secondary generalization.  Based on the EEG, a temporal lobe focus is likely; however, it is not clear whether she has bilateral seizure foci or if all of her seizures originate from a single temporal lobe.  The etiology for her epilepsy is unclear.  I do not feel further workup is necessary at this time.  Should she fail multiple AEDs, I would advocate for an aggressive surgical evaluation, including inpatient vEEG monitoring, PET scan, neuropsychological evaluation, and so forth.  From a management perspective, we will continue her Zonegran 300 mg daily.  We will check labs and a DEXA for medication monitoring purposes.  Medication side effects were discussed with the patient.  State law as it pertains to driving for individuals with seizures has been discussed.  The patient has also been counseled on seizure safety.  Teratogenicity of AEDs were discussed.  She was instructed to make sure she was utilizing a reliable means of birth control.  Should she decide to become pregnant, she is to contact us first, so we can determine what medication adjustments, if any, are appropriate.  The patient was counseled that the risks posed by uncontrolled seizures typically exceeds that " posed by the medications themselves. Consequently, the patient was advised of the importance of continuing her anticonvulsant medication should she become pregnant.  She was advised to take supplemental folate at 1 mg daily.  Should she desire to become pregnant or actually become pregnant, we will plan on increasing it to 4 mg daily.  I also discussed with the patient the need for a PCP.  We will plan on seeing the patient back in a few weeks.

## 2017-09-15 ENCOUNTER — LAB VISIT (OUTPATIENT)
Dept: LAB | Facility: HOSPITAL | Age: 22
End: 2017-09-15
Attending: PSYCHIATRY & NEUROLOGY
Payer: MEDICAID

## 2017-09-15 DIAGNOSIS — G40.209 PARTIAL SYMPTOMATIC EPILEPSY WITH COMPLEX PARTIAL SEIZURES, NOT INTRACTABLE, WITHOUT STATUS EPILEPTICUS: ICD-10-CM

## 2017-09-15 LAB
ALBUMIN SERPL BCP-MCNC: 3.8 G/DL
ALP SERPL-CCNC: 76 U/L
ALT SERPL W/O P-5'-P-CCNC: 14 U/L
ANION GAP SERPL CALC-SCNC: 7 MMOL/L
AST SERPL-CCNC: 15 U/L
BASOPHILS # BLD AUTO: 0 K/UL
BASOPHILS NFR BLD: 0.7 %
BILIRUB SERPL-MCNC: 0.3 MG/DL
BUN SERPL-MCNC: 15 MG/DL
CALCIUM SERPL-MCNC: 9.4 MG/DL
CHLORIDE SERPL-SCNC: 108 MMOL/L
CO2 SERPL-SCNC: 25 MMOL/L
CREAT SERPL-MCNC: 0.7 MG/DL
DIFFERENTIAL METHOD: ABNORMAL
EOSINOPHIL # BLD AUTO: 0.1 K/UL
EOSINOPHIL NFR BLD: 1.5 %
ERYTHROCYTE [DISTWIDTH] IN BLOOD BY AUTOMATED COUNT: 13.2 %
EST. GFR  (AFRICAN AMERICAN): >60 ML/MIN/1.73 M^2
EST. GFR  (NON AFRICAN AMERICAN): >60 ML/MIN/1.73 M^2
GLUCOSE SERPL-MCNC: 74 MG/DL
HCG SERPL QL: NEGATIVE
HCT VFR BLD AUTO: 37.8 %
HGB BLD-MCNC: 12.7 G/DL
LYMPHOCYTES # BLD AUTO: 2.1 K/UL
LYMPHOCYTES NFR BLD: 31.1 %
MCH RBC QN AUTO: 28.9 PG
MCHC RBC AUTO-ENTMCNC: 33.7 G/DL
MCV RBC AUTO: 86 FL
MONOCYTES # BLD AUTO: 0.6 K/UL
MONOCYTES NFR BLD: 9.3 %
NEUTROPHILS # BLD AUTO: 3.8 K/UL
NEUTROPHILS NFR BLD: 57.4 %
PLATELET # BLD AUTO: 333 K/UL
PMV BLD AUTO: 8.1 FL
POTASSIUM SERPL-SCNC: 3.8 MMOL/L
PROT SERPL-MCNC: 7.4 G/DL
RBC # BLD AUTO: 4.41 M/UL
SODIUM SERPL-SCNC: 140 MMOL/L
WBC # BLD AUTO: 6.6 K/UL

## 2017-09-15 PROCEDURE — 80053 COMPREHEN METABOLIC PANEL: CPT

## 2017-09-15 PROCEDURE — 85025 COMPLETE CBC W/AUTO DIFF WBC: CPT

## 2017-09-15 PROCEDURE — 84703 CHORIONIC GONADOTROPIN ASSAY: CPT

## 2017-09-15 PROCEDURE — 80203 DRUG SCREEN QUANT ZONISAMIDE: CPT

## 2017-09-15 PROCEDURE — 36415 COLL VENOUS BLD VENIPUNCTURE: CPT

## 2017-09-17 LAB — ZONISAMIDE SERPL-MCNC: 9.8 MCG/ML (ref 10–40)

## 2017-09-18 ENCOUNTER — TELEPHONE (OUTPATIENT)
Dept: NEUROLOGY | Facility: CLINIC | Age: 22
End: 2017-09-18

## 2017-09-18 NOTE — TELEPHONE ENCOUNTER
----- Message from Drake Ruff Jr., MD sent at 9/18/2017  8:49 AM CDT -----  Verify how the patient is taking her Zonegran.

## 2017-09-18 NOTE — TELEPHONE ENCOUNTER
Attempted home number, mother-in-law answered and stated that Syeda wasn't home and did not have another number to contact. She stated that she will tell Syeda to give us a call back whenever she is able to.

## 2017-09-21 DIAGNOSIS — G40.109 LOCALIZATION-RELATED EPILEPSY: ICD-10-CM

## 2017-09-21 RX ORDER — ZONISAMIDE 100 MG/1
400 CAPSULE ORAL NIGHTLY
Qty: 120 CAPSULE | Refills: 11 | Status: SHIPPED | OUTPATIENT
Start: 2017-09-21 | End: 2017-10-11 | Stop reason: SDUPTHER

## 2017-09-21 NOTE — TELEPHONE ENCOUNTER
Spoke with patient. Informed her that Dr. Ruff wants her to increase her Zonegran to 400mg a night instead of the 300mg. Verbalized understanding.

## 2017-09-21 NOTE — TELEPHONE ENCOUNTER
----- Message from Joycelyn Collier sent at 9/21/2017 11:28 AM CDT -----  Returning your call.  Please call patient at [phone]

## 2017-09-22 ENCOUNTER — HOSPITAL ENCOUNTER (OUTPATIENT)
Dept: RADIOLOGY | Facility: CLINIC | Age: 22
Discharge: HOME | End: 2017-09-22
Attending: PSYCHIATRY & NEUROLOGY
Payer: MEDICAID

## 2017-09-22 DIAGNOSIS — G40.209 PARTIAL SYMPTOMATIC EPILEPSY WITH COMPLEX PARTIAL SEIZURES, NOT INTRACTABLE, WITHOUT STATUS EPILEPTICUS: ICD-10-CM

## 2017-09-22 PROCEDURE — 77080 DXA BONE DENSITY AXIAL: CPT | Mod: 26,,, | Performed by: RADIOLOGY

## 2017-09-22 PROCEDURE — 77080 DXA BONE DENSITY AXIAL: CPT | Mod: TC,PO

## 2017-10-02 ENCOUNTER — HOSPITAL ENCOUNTER (EMERGENCY)
Facility: HOSPITAL | Age: 22
Discharge: HOME | End: 2017-10-02
Attending: EMERGENCY MEDICINE
Payer: MEDICAID

## 2017-10-02 VITALS
TEMPERATURE: 98 F | BODY MASS INDEX: 33.31 KG/M2 | RESPIRATION RATE: 16 BRPM | HEART RATE: 77 BPM | HEIGHT: 63 IN | OXYGEN SATURATION: 99 % | DIASTOLIC BLOOD PRESSURE: 70 MMHG | SYSTOLIC BLOOD PRESSURE: 118 MMHG | WEIGHT: 188 LBS

## 2017-10-02 DIAGNOSIS — K08.89 PAIN, DENTAL: Primary | ICD-10-CM

## 2017-10-02 PROCEDURE — 99283 EMERGENCY DEPT VISIT LOW MDM: CPT

## 2017-10-02 RX ORDER — HYDROCODONE BITARTRATE AND ACETAMINOPHEN 5; 325 MG/1; MG/1
1 TABLET ORAL EVERY 4 HOURS PRN
Qty: 8 TABLET | Refills: 0 | Status: SHIPPED | OUTPATIENT
Start: 2017-10-02 | End: 2017-10-12

## 2017-10-03 NOTE — ED PROVIDER NOTES
Encounter Date: 10/2/2017       History     Chief Complaint   Patient presents with    Dental Pain     to left face with swelling     Syeda Gonzalez is a 22 year old female with pmh seizures presenting to the ED with c/o dental pain for the past three days. The patient states that she has had dental pain in the past but that it has increased. She has an appointment scheduled in three days with a dentist. She reports mild facial and left ear swelling with no fever, difficulty swallowing, or difficulty breathing.           Review of patient's allergies indicates:   Allergen Reactions    Benadryl [diphenhydramine hcl] Other (See Comments)     Lowers seizure threshold.    Quinolones Other (See Comments)     Lowers seizure threshold.     Tramadol Other (See Comments)     seizures     Past Medical History:   Diagnosis Date    Asthma     Seizures     Epilepsy     Past Surgical History:   Procedure Laterality Date    ADENOIDECTOMY W/ MYRINGOTOMY AND TUBES      CHOLECYSTECTOMY       Family History   Problem Relation Age of Onset    Heart disease Father     Heart disease Maternal Grandfather     Heart disease Paternal Grandmother     Heart disease Paternal Grandfather      Social History   Substance Use Topics    Smoking status: Never Smoker    Smokeless tobacco: Never Used    Alcohol use Yes      Comment: rarely     Review of Systems   Constitutional: Negative for chills and fever.   HENT: Positive for dental problem and facial swelling. Negative for congestion, rhinorrhea, sore throat and trouble swallowing.    Eyes: Negative for pain and redness.   Respiratory: Negative for cough and shortness of breath.    Cardiovascular: Negative for chest pain and palpitations.   Gastrointestinal: Negative for abdominal pain, diarrhea and nausea.   Genitourinary: Negative for dysuria, flank pain, frequency, hematuria and urgency.   Musculoskeletal: Negative for gait problem, myalgias and neck pain.   Skin: Negative for  rash.   Neurological: Negative for dizziness, light-headedness and headaches.       Physical Exam     Initial Vitals [10/02/17 2110]   BP Pulse Resp Temp SpO2   118/70 77 16 97.5 °F (36.4 °C) 99 %      MAP       86         Physical Exam    Constitutional: Vital signs are normal. She appears well-developed and well-nourished. She is not diaphoretic. She does not have a sickly appearance. No distress.   HENT:   Head: Normocephalic and atraumatic.   Mouth/Throat: No trismus in the jaw. No dental abscesses.       Fillings noted to left upper molars. No abscess, facial swelling, trismus   Eyes: Conjunctivae are normal.   Neck: Normal range of motion and full passive range of motion without pain.   Cardiovascular: Normal rate, regular rhythm and normal heart sounds.   Pulmonary/Chest: Breath sounds normal. No respiratory distress.   Abdominal: Soft. Bowel sounds are normal. There is no tenderness.   Musculoskeletal: Normal range of motion.   Neurological: She is alert. Gait normal.   Skin: Skin is warm and dry. Capillary refill takes less than 2 seconds.   Psychiatric: She has a normal mood and affect.         ED Course   Procedures  Labs Reviewed - No data to display                APC / Resident Notes:   Syeda Gonzalez is a 22 y.o. female who presents today complaining of dental pain. The patient notes that they have had poor dentition for quite some time however recently it has become very painful. The patient denies any fevers, chills, nausea, vomiting, diarrhea/dysurea, neck stiffness, photophobia, or any other complaints. The pt is tolerating po's. They present requesting  pain control.  I decided to obtain and review the old medical record.     Based upon the history and physical I see no signs of Isaac's angina, sublingual swelling, facial swelling, airway compromise, facial cellulitis, sepsis, dehydration, or a fluctuant abscess to drain.     I believe the patient can be discharged home with  anti-inflammatories, and pain medications.  The patient has been given specific return precautions and instructed to follow up with a dentist.    She has an appointment scheduled with dentist in three days.     The results and physical exam findings were reviewed with the patient. Pt agrees with assessment, disposition and treatment plan and has no further questions or complaints at this time.    MEHDI Garvin, FNP-C. 1:19 PM 10/3/2017                     Attending Attestation:     Physician Attestation Statement for NP/PA:   I have conducted a face to face encounter with this patient in addition to the NP/PA, due to Medical Complexity    Other NP/PA Attestation Additions:      Medical Decision Making: I provided a face to face evaluation of this patient.  I discussed the patient's care with Advanced Practice Clinician.  I reviewed their note and agree with the history, physical, assessment, diagnosis, treatment, and discharge plan provided by the Advanced Practice Clinician. My overall impression is dental pain.  The patient has been instructed to follow up with their physician or the one provided as well as specific return precautions.                    ED Course as of Oct 03 1314   Mon Oct 02, 2017   2222 22-year-old with a history of seizures presents with dental pain and the location of the left upper molar, she has had prior cavities which have been filled.  I see no obvious sign of infection, I see no indication for antibiotics.  She will be given a small prescription for pain medication on discharge, return to the ER if symptoms worsen.  She does have an appointment later this week with a dentist.  [EF]      ED Course User Index  [EF] Ollie Odonnell MD     Clinical Impression:   The encounter diagnosis was Pain, dental.    Disposition:   Disposition: Discharged  Condition: Stable                        Randa Larson NP  10/03/17 1320       Ollie Odonnell MD  10/03/17 6956

## 2017-10-11 DIAGNOSIS — G40.109 LOCALIZATION-RELATED EPILEPSY: ICD-10-CM

## 2017-10-11 RX ORDER — ZONISAMIDE 100 MG/1
400 CAPSULE ORAL NIGHTLY
Qty: 120 CAPSULE | Refills: 11 | Status: SHIPPED | OUTPATIENT
Start: 2017-10-11 | End: 2018-09-18 | Stop reason: SDUPTHER

## 2017-10-11 NOTE — TELEPHONE ENCOUNTER
----- Message from Dinh Kirby sent at 10/11/2017  1:46 PM CDT -----  Contact: pt  Pt is requesting a refill on her zonisamide (ZONEGRAN) 100 MG Cap   Call Back#[phone]  Thanks    [id number] - [place] - [address]  [address] [place] [address]  Phone: [phone] Fax: [phone]

## 2017-10-24 ENCOUNTER — TELEPHONE (OUTPATIENT)
Dept: NEUROLOGY | Facility: CLINIC | Age: 22
End: 2017-10-24

## 2017-10-24 ENCOUNTER — HOSPITAL ENCOUNTER (EMERGENCY)
Facility: HOSPITAL | Age: 22
Discharge: HOME | End: 2017-10-24
Attending: EMERGENCY MEDICINE
Payer: MEDICAID

## 2017-10-24 VITALS
HEIGHT: 63 IN | SYSTOLIC BLOOD PRESSURE: 118 MMHG | RESPIRATION RATE: 20 BRPM | HEART RATE: 102 BPM | BODY MASS INDEX: 32.78 KG/M2 | WEIGHT: 185 LBS | TEMPERATURE: 98 F | OXYGEN SATURATION: 100 % | DIASTOLIC BLOOD PRESSURE: 68 MMHG

## 2017-10-24 DIAGNOSIS — Z32.01 POSITIVE PREGNANCY TEST: ICD-10-CM

## 2017-10-24 DIAGNOSIS — T14.8XXA PUNCTURE WOUND: Primary | ICD-10-CM

## 2017-10-24 DIAGNOSIS — R56.9 SEIZURES: Primary | ICD-10-CM

## 2017-10-24 DIAGNOSIS — M79.672 LEFT FOOT PAIN: ICD-10-CM

## 2017-10-24 LAB
B-HCG UR QL: POSITIVE
CTP QC/QA: YES

## 2017-10-24 PROCEDURE — 63600175 PHARM REV CODE 636 W HCPCS: Performed by: PHYSICIAN ASSISTANT

## 2017-10-24 PROCEDURE — 99284 EMERGENCY DEPT VISIT MOD MDM: CPT | Mod: 25

## 2017-10-24 PROCEDURE — 25000003 PHARM REV CODE 250: Performed by: EMERGENCY MEDICINE

## 2017-10-24 PROCEDURE — 81025 URINE PREGNANCY TEST: CPT | Performed by: PHYSICIAN ASSISTANT

## 2017-10-24 PROCEDURE — 90715 TDAP VACCINE 7 YRS/> IM: CPT | Performed by: PHYSICIAN ASSISTANT

## 2017-10-24 PROCEDURE — 90471 IMMUNIZATION ADMIN: CPT | Performed by: PHYSICIAN ASSISTANT

## 2017-10-24 RX ORDER — ACETAMINOPHEN 325 MG/1
325 TABLET ORAL
Status: DISCONTINUED | OUTPATIENT
Start: 2017-10-24 | End: 2017-10-24

## 2017-10-24 RX ORDER — ACETAMINOPHEN 325 MG/1
650 TABLET ORAL
Status: COMPLETED | OUTPATIENT
Start: 2017-10-24 | End: 2017-10-24

## 2017-10-24 RX ORDER — IBUPROFEN 600 MG/1
600 TABLET ORAL
Status: DISCONTINUED | OUTPATIENT
Start: 2017-10-24 | End: 2017-10-24

## 2017-10-24 RX ADMIN — ACETAMINOPHEN 650 MG: 325 TABLET ORAL at 02:10

## 2017-10-24 RX ADMIN — CLOSTRIDIUM TETANI TOXOID ANTIGEN (FORMALDEHYDE INACTIVATED), CORYNEBACTERIUM DIPHTHERIAE TOXOID ANTIGEN (FORMALDEHYDE INACTIVATED), BORDETELLA PERTUSSIS TOXOID ANTIGEN (GLUTARALDEHYDE INACTIVATED), BORDETELLA PERTUSSIS FILAMENTOUS HEMAGGLUTININ ANTIGEN (FORMALDEHYDE INACTIVATED), BORDETELLA PERTUSSIS PERTACTIN ANTIGEN, AND BORDETELLA PERTUSSIS FIMBRIAE 2/3 ANTIGEN 0.5 ML: 5; 2; 2.5; 5; 3; 5 INJECTION, SUSPENSION INTRAMUSCULAR at 02:10

## 2017-10-24 NOTE — TELEPHONE ENCOUNTER
----- Message from Denae Harrison sent at 10/24/2017  3:07 PM CDT -----  Patient is calling because she just found out she is pregnant. She needs to discuss her medications. Please call at [phone] Thank you!

## 2017-10-24 NOTE — TELEPHONE ENCOUNTER
Do not change medications at this time.  Do not change medications without instructions from me at any time during the pregnancy.  Increase folate to 4 mg daily.  Will check Zonegran level now (trough level)-- order is in.  Schedule visit in clinic in 1 month.

## 2017-10-24 NOTE — ED PROVIDER NOTES
"Encounter Date: 10/24/2017    SCRIBE #1 NOTE: I, Clementine Mann, am scribing for, and in the presence of,  LOUIE Recinos . I have scribed the entire note.       History     Chief Complaint   Patient presents with    Puncture Wound     left foot / yara nail  / barefoot        10/24/2017 2:26 PM     Chief complaint: Puncture wound on left foot       Tracy Gonzalez is a 22 y.o. female with a history of asthma and epilepsy who presents to the ED with an onset of a puncture wound on the bottom on her left foot with associated swelling after she stepped on a yara nail "2 days ago." Patient reports being barefoot when she stepped on a "small, yara nail" at her house. The patient put rubbing alcohol on the wound "right after it happened" and notes that she has been limping. She does not know when her last Tetanus shot was. Patient is "highly allergic to tramadol." No pertinent PSHx noted.       The history is provided by the patient.     Review of patient's allergies indicates:   Allergen Reactions    Benadryl [diphenhydramine hcl] Other (See Comments)     Lowers seizure threshold.    Quinolones Other (See Comments)     Lowers seizure threshold.     Tramadol Other (See Comments)     seizures     Past Medical History:   Diagnosis Date    Asthma     Seizures     Epilepsy     Past Surgical History:   Procedure Laterality Date    ADENOIDECTOMY W/ MYRINGOTOMY AND TUBES      CHOLECYSTECTOMY       Family History   Problem Relation Age of Onset    Heart disease Father     Heart disease Maternal Grandfather     Heart disease Paternal Grandmother     Heart disease Paternal Grandfather      Social History   Substance Use Topics    Smoking status: Never Smoker    Smokeless tobacco: Never Used    Alcohol use Yes      Comment: rarely     Review of Systems   Constitutional: Negative for activity change, appetite change, chills and fever.   HENT: Negative for congestion, rhinorrhea and sore throat.  "   Respiratory: Negative for cough, chest tightness and shortness of breath.    Cardiovascular: Negative for chest pain.   Gastrointestinal: Negative for abdominal pain, diarrhea, nausea and vomiting.   Genitourinary: Negative for dysuria and frequency.   Musculoskeletal: Negative for back pain, gait problem, neck pain and neck stiffness.   Skin: Positive for wound (puncture wound on bottom of her left foot). Negative for rash.   Neurological: Negative for dizziness, syncope, numbness and headaches.       Physical Exam     Initial Vitals [10/24/17 1410]   BP Pulse Resp Temp SpO2   118/68 102 20 98.2 °F (36.8 °C) 100 %      MAP       84.67         Physical Exam    Constitutional: Vital signs are normal. She appears well-developed and well-nourished. She is cooperative.  Non-toxic appearance. She does not have a sickly appearance.   HENT:   Head: Normocephalic and atraumatic.   Right Ear: External ear normal.   Left Ear: External ear normal.   Nose: Nose normal.   Mouth/Throat: Oropharynx is clear and moist.   Eyes: Conjunctivae and lids are normal. Pupils are equal, round, and reactive to light.   Neck: Normal range of motion and full passive range of motion without pain. Neck supple.   Cardiovascular: Normal rate and regular rhythm.   Pulmonary/Chest: Breath sounds normal. No respiratory distress. She has no wheezes.   Abdominal: Soft. Normal appearance. There is no tenderness. There is no rigidity, no rebound and no guarding.   Musculoskeletal:        Left foot: There is tenderness. There is normal range of motion and no bony tenderness.        Feet:    Small abrasion noted to the plantar aspect of the left foot with mild tenderness. No swelling, erythema or warmth.    Neurological: She is alert and oriented to person, place, and time.   Skin: Skin is warm, dry and intact. No rash noted.         ED Course   Procedures  Labs Reviewed   POCT URINE PREGNANCY - Abnormal; Notable for the following:        Result Value     POC Preg Test, Ur Positive (*)     All other components within normal limits        Imaging Results          X-Ray Foot Complete Left (Final result)  Result time 10/24/17 14:49:27    Final result by Gosia Villalba MD (10/24/17 14:49:27)                 Impression:        As above      Electronically signed by: GOSIA VILLALBA MD  Date:     10/24/17  Time:    14:49              Narrative:    Left foot 3 views    There is no acute fracture or dislocation.                                 Medical Decision Making:   History:   Old Medical Records: I decided to obtain old medical records.  Clinical Tests:   Lab Tests: Ordered and Reviewed  Radiological Study: Ordered and Reviewed       APC / Resident Notes:   This is an emergent evaluation of a 22-year-old female who presents with a puncture in 2 days ago.  She is unsure when her last tetanus shot was.  She reports mild tenderness to the foot.  She denied fever, chills, erythema or warmth.  She has a small abrasion noted to the plantar aspect of the left foot.  There is no signs of cellulitis.  There is no bony tenderness.  UPT noted to be positive.  She was unaware of this and notified follow with OB/GYN.  She denied any complaints.  Tetanus updated.  Wound cleaned. Discussed results with patient. Return precautions given. Patient is to follow up with their primary care provider. Case was discussed with Dr. Matthew who is in agreement with the plan of care. All questions answered.          Scribe Attestation:   Scribe #1: I performed the above scribed service and the documentation accurately describes the services I performed. I attest to the accuracy of the note.    I, Coco Sandoval PA-C, personally performed the services described in this documentation. All medical record entries made by the scribe were at my direction and in my presence.  I have reviewed the chart and agree that the record reflects my personal performance and is accurate and complete. Coco  ENEDINA Sandoval.  5:37 PM 10/24/2017          ED Course      Clinical Impression:     1. Puncture wound    2. Left foot pain    3. Positive pregnancy test          Disposition:   Disposition: Discharged  Condition: Stable                        Coco Hamiltonaudrey Sandoval PA-C  10/24/17 1732

## 2017-10-25 ENCOUNTER — TELEPHONE (OUTPATIENT)
Dept: NEUROLOGY | Facility: CLINIC | Age: 22
End: 2017-10-25

## 2017-10-25 NOTE — TELEPHONE ENCOUNTER
"Spoke with the patient's boyfriend notified him of Dr. Ruff's instructions.  He state's, "she is either not taking her medication or taking decreased about of her AED's.  She is scared of the side effects to the baby.  Also instructed to increase folate to 4 mg daily.  He verbalized understanding.  "

## 2017-10-25 NOTE — TELEPHONE ENCOUNTER
As I have previously explained to her in clinic, the risk posed to the unborn child by the seizure medication is outweighed by the risk posed if she were to have convulsive seizures during pregnancy.  She should follow the recommendations outlined at our clinic visits/in my last response to the call.

## 2017-10-27 ENCOUNTER — TELEPHONE (OUTPATIENT)
Dept: NEUROLOGY | Facility: CLINIC | Age: 22
End: 2017-10-27

## 2017-10-27 NOTE — TELEPHONE ENCOUNTER
----- Message from Ann Marie Rubio sent at 10/26/2017  9:55 AM CDT -----  Contact: self  Patient called regarding phone call received yesterday to her spouse for her medication. Stating pregnant and advised by Dr. Ruff it would changed. Stating nurse advised something different on yesterday. Please contact [phone]

## 2017-10-27 NOTE — TELEPHONE ENCOUNTER
Spoke with the patient, informed her that Dr. Ruff does not want her to stop the medication.  She stopped it when she found out she was pregnant. Instructed to take 4 mg Folic acid.  Patient has many concerns about the medication during pregnancy. Instructed her it is worse on the baby for her to have seizures.  Appointment scheduled with Dr. Tijerina on 11/3/2017

## 2017-11-03 ENCOUNTER — TELEPHONE (OUTPATIENT)
Dept: NEUROLOGY | Facility: CLINIC | Age: 22
End: 2017-11-03

## 2017-11-03 NOTE — TELEPHONE ENCOUNTER
----- Message from Iqra Casillas sent at 11/3/2017  3:51 PM CDT -----  Contact: Patient  Syeda, patient [phone] Patient was unaware of the location when the appointment was scheduled. She will need to reschedule to next week in the afternoon. Not 11/9/17. Schedule is blocked. Please advise. Thanks.

## 2018-02-22 ENCOUNTER — TELEPHONE (OUTPATIENT)
Dept: NEUROLOGY | Facility: CLINIC | Age: 23
End: 2018-02-22

## 2018-02-22 NOTE — TELEPHONE ENCOUNTER
----- Message from Joycelyn Collier sent at 2/21/2018  2:41 PM CST -----  Patient states that she need to schedule a hospital f/u visit from [facility] for seizures.  The appointment that came up for me was March 22 and patient states that she need to be seen sooner.  Please call [phone].

## 2018-02-22 NOTE — TELEPHONE ENCOUNTER
Spoke with patient and offered appointment tomorrow with Dr. Ruff for 12:00pm. Patient stated there was no way that she could come in tomorrow due to having her fiance's  tomorrow. Stated I would call back when we figured out where to work her in at.

## 2018-02-26 NOTE — TELEPHONE ENCOUNTER
Attempted to call patient to offer an open f/u spot with Dr. Ruff for next week or the 8:00am with Dr. Tijerina at the end of next week. Left a message for who answered the phone for her to call back.

## 2018-03-12 DIAGNOSIS — Z82.79 FAMILY HISTORY OF CONGENITAL HEART DEFECT: ICD-10-CM

## 2018-03-12 DIAGNOSIS — Z36.89 ENCOUNTER FOR FETAL ANATOMIC SURVEY: Primary | ICD-10-CM

## 2018-03-12 DIAGNOSIS — Z87.898 HISTORY OF SEIZURE: ICD-10-CM

## 2018-03-15 PROBLEM — O09.891 MEDICATION EXPOSURE DURING FIRST TRIMESTER OF PREGNANCY: Status: ACTIVE | Noted: 2018-03-15

## 2018-03-15 PROBLEM — Z82.49 FAMILY HISTORY OF CARDIAC DISORDER IN FATHER: Status: ACTIVE | Noted: 2018-03-15

## 2018-03-19 ENCOUNTER — TELEPHONE (OUTPATIENT)
Dept: PEDIATRIC CARDIOLOGY | Facility: CLINIC | Age: 23
End: 2018-03-19

## 2018-03-19 NOTE — TELEPHONE ENCOUNTER
Spoke with patient via telephone. Fetal echo scheduled for 4/9/18 @ 1 pm in slide. Office address and phone number verified for further questions/concerns.

## 2018-04-09 ENCOUNTER — OFFICE VISIT (OUTPATIENT)
Dept: PEDIATRIC CARDIOLOGY | Facility: CLINIC | Age: 23
End: 2018-04-09
Payer: MEDICAID

## 2018-04-09 ENCOUNTER — CLINICAL SUPPORT (OUTPATIENT)
Dept: PEDIATRIC CARDIOLOGY | Facility: CLINIC | Age: 23
End: 2018-04-09
Payer: MEDICAID

## 2018-04-09 VITALS
HEART RATE: 90 BPM | WEIGHT: 181.44 LBS | HEIGHT: 63 IN | BODY MASS INDEX: 32.15 KG/M2 | DIASTOLIC BLOOD PRESSURE: 74 MMHG | SYSTOLIC BLOOD PRESSURE: 116 MMHG

## 2018-04-09 DIAGNOSIS — Z36.89 ENCOUNTER FOR FETAL ANATOMIC SURVEY: ICD-10-CM

## 2018-04-09 DIAGNOSIS — Z87.898 HISTORY OF SEIZURE: ICD-10-CM

## 2018-04-09 DIAGNOSIS — Z82.49 FAMILY HISTORY OF CARDIAC DISORDER IN FATHER: Primary | ICD-10-CM

## 2018-04-09 DIAGNOSIS — Z82.79 FAMILY HISTORY OF CONGENITAL HEART DEFECT: ICD-10-CM

## 2018-04-09 DIAGNOSIS — O09.891 MEDICATION EXPOSURE DURING FIRST TRIMESTER OF PREGNANCY: ICD-10-CM

## 2018-04-09 DIAGNOSIS — Z03.73 FETAL ANOMALY SUSPECTED BUT NOT FOUND: ICD-10-CM

## 2018-04-09 PROCEDURE — 76827 ECHO EXAM OF FETAL HEART: CPT | Mod: PBBFAC,PO | Performed by: PEDIATRICS

## 2018-04-09 PROCEDURE — 99213 OFFICE O/P EST LOW 20 MIN: CPT | Mod: PBBFAC,25,PO | Performed by: PEDIATRICS

## 2018-04-09 PROCEDURE — 76825 ECHO EXAM OF FETAL HEART: CPT | Mod: 26,S$PBB,, | Performed by: PEDIATRICS

## 2018-04-09 PROCEDURE — 93325 DOPPLER ECHO COLOR FLOW MAPG: CPT | Mod: PBBFAC,PO | Performed by: PEDIATRICS

## 2018-04-09 PROCEDURE — 99203 OFFICE O/P NEW LOW 30 MIN: CPT | Mod: 25,S$PBB,, | Performed by: PEDIATRICS

## 2018-04-09 PROCEDURE — 76827 ECHO EXAM OF FETAL HEART: CPT | Mod: 26,S$PBB,, | Performed by: PEDIATRICS

## 2018-04-09 PROCEDURE — 93325 DOPPLER ECHO COLOR FLOW MAPG: CPT | Mod: 26,S$PBB,, | Performed by: PEDIATRICS

## 2018-04-09 PROCEDURE — 76825 ECHO EXAM OF FETAL HEART: CPT | Mod: PBBFAC,PO | Performed by: PEDIATRICS

## 2018-04-09 PROCEDURE — 99999 PR PBB SHADOW E&M-EST. PATIENT-LVL III: CPT | Mod: PBBFAC,,, | Performed by: PEDIATRICS

## 2018-04-09 RX ORDER — FLUOXETINE 10 MG/1
20 CAPSULE ORAL DAILY
Status: ON HOLD | COMMUNITY
Start: 2018-04-02 | End: 2018-08-24 | Stop reason: HOSPADM

## 2018-04-12 NOTE — PROGRESS NOTES
[facility]    Today, I had the pleasure of evaluating Syeda Gonzalez who is now 22 y.o. and carrying her second pregnancy at 27 0/7 weeks gestation with an BINDU of 18. She was referred for evaluation of the fetal heart due a family history of a myocardial bridge and sudden death of the fetus's father in .      She is carrying a male  fetus, named Jaziel.      Obstetric History:    .  Her OB history is otherwise unremarkable. She sees Dr. Dima Veliz for her OB care and Dr. Archibald for her M care.    Past Medical History:   Diagnosis Date    Asthma     Depression affecting pregnancy     Mental disorder     Seizures     Epilepsy         Current Outpatient Prescriptions:     FLUoxetine (PROZAC) 10 MG capsule, , Disp: , Rfl:     FLUoxetine (PROZAC) 20 MG capsule, Take 20 mg by mouth once daily., Disp: , Rfl:     folic acid (FOLVITE) 800 MCG Tab, Take 800 mcg by mouth once daily., Disp: , Rfl:     PRENATAL VIT/IRON FUM/FOLIC AC (PRENATAL 1+1 ORAL), Take by mouth., Disp: , Rfl:     zonisamide (ZONEGRAN) 100 MG Cap, Take 4 capsules (400 mg total) by mouth every evening. 3 caps PO nightly, Disp: 120 capsule, Rfl: 11    Family History: Except as above, negative for congenital heart disease, early coronary artery disease, sudden unexplained death, connective tissues disorders, genetic syndromes, multiple miscarriages or other congenital anomalies.    Social History: Ms. Syeda Gonzalez is  to the father of the baby/single.  The father of the baby is/is not involved.     FETAL ECHOCARDIOGRAM (summary):  Fetal echocardiogram at 27 4/7 weeks gestation for a history of maternal seizure  disorder and sudden cardiac death in the father of the baby. BINDU 18.  Normally connected heart.  No ectopy or sustained arrhythmia demonstrated throughout the study.  Normal fetal atrial and ductal level shunting.  No ventricular level  shunting.  Normal atrioventricular and semilunar valve structure and function.  Normal ductal and aortic arches.  Normal biventricular size and systolic function.  No pericardial effusion.  (Full report in electronic medical record)    Impression:  Single active male fetus at 27 wga.  Normal fetal echocardiogram.      Todays fetal echocardiogram is normal, within the limitations of fetal echocardiography.  I discussed with her that fetal echocardiography is insufficiently sensitive to rule out all septal defects, anomalies of pulmonary and systemic veins, arch anomalies, and some valvar abnormalities, nor can it ensure that the ductus arteriosus and foramen ovale will spontaneously close.  I also told Ms. Gonzalez that myocardial bridges are common (found in 25-50% of patients in the literature) and whether they are clinically important is controversial.  I told her that her partner's finding of a myocardial bridge may been an incidental finding and not necessarily the cause of his death.  Further, I told her that I would not be able to assess for the presence of a myocardial bridge via echocardiography.     Recommendations:  Location, timing, and mode of delivery will be determined by the obstetrical team.  She does not require further follow-up in the fetal echocardiography clinic, but I would be happy to see her again if additional questions or concerns arise.    Should there be any concerns about the baby's heart after birth, a post- echocardiogram and cardiology consultation are recommended.     The above information was discussed in detail including the use of diagrams, 60 minutes were used for the evaluation with half of that time in discussion and counseling.    Madi Juárez MD, MPH  Pediatric and Fetal Cardiology  [facility]  [address] [place] [address]    Office: [phone]  Pager: [phone]

## 2018-04-24 ENCOUNTER — DOCUMENTATION ONLY (OUTPATIENT)
Dept: FAMILY MEDICINE | Facility: CLINIC | Age: 23
End: 2018-04-24

## 2018-04-24 NOTE — PROGRESS NOTES
Pre-Visit Chart Review  For Appointment Scheduled on 4/25/18    Health Maintenance Due   Topic Date Due    Lipid Panel  1993    HPV Vaccines (1 of 3 - Female 3 Dose Series) 04/14/2004    Pap Smear  04/14/2014    Influenza Vaccine  08/01/2017

## 2018-04-25 ENCOUNTER — LAB VISIT (OUTPATIENT)
Dept: LAB | Facility: HOSPITAL | Age: 23
End: 2018-04-25
Attending: FAMILY MEDICINE
Payer: MEDICAID

## 2018-04-25 ENCOUNTER — OFFICE VISIT (OUTPATIENT)
Dept: FAMILY MEDICINE | Facility: CLINIC | Age: 23
End: 2018-04-25
Payer: MEDICAID

## 2018-04-25 VITALS
HEART RATE: 92 BPM | TEMPERATURE: 98 F | SYSTOLIC BLOOD PRESSURE: 104 MMHG | WEIGHT: 182.56 LBS | BODY MASS INDEX: 33.6 KG/M2 | DIASTOLIC BLOOD PRESSURE: 66 MMHG | HEIGHT: 62 IN

## 2018-04-25 DIAGNOSIS — F32.A DEPRESSION DURING PREGNANCY IN THIRD TRIMESTER: ICD-10-CM

## 2018-04-25 DIAGNOSIS — Z11.4 ENCOUNTER FOR SCREENING FOR HIV: ICD-10-CM

## 2018-04-25 DIAGNOSIS — Z29.9 PREVENTIVE MEASURE: ICD-10-CM

## 2018-04-25 DIAGNOSIS — Z00.00 ANNUAL PHYSICAL EXAM: ICD-10-CM

## 2018-04-25 DIAGNOSIS — G40.209 PARTIAL SYMPTOMATIC EPILEPSY WITH COMPLEX PARTIAL SEIZURES, NOT INTRACTABLE, WITHOUT STATUS EPILEPTICUS: ICD-10-CM

## 2018-04-25 DIAGNOSIS — O99.343 DEPRESSION DURING PREGNANCY IN THIRD TRIMESTER: ICD-10-CM

## 2018-04-25 DIAGNOSIS — G40.209 PARTIAL SYMPTOMATIC EPILEPSY WITH COMPLEX PARTIAL SEIZURES, NOT INTRACTABLE, WITHOUT STATUS EPILEPTICUS: Primary | ICD-10-CM

## 2018-04-25 LAB
25(OH)D3+25(OH)D2 SERPL-MCNC: 42 NG/ML
ALBUMIN SERPL BCP-MCNC: 2.5 G/DL
ALP SERPL-CCNC: 140 U/L
ALT SERPL W/O P-5'-P-CCNC: 13 U/L
ANION GAP SERPL CALC-SCNC: 9 MMOL/L
AST SERPL-CCNC: 15 U/L
BASOPHILS # BLD AUTO: 0.03 K/UL
BASOPHILS NFR BLD: 0.4 %
BILIRUB SERPL-MCNC: 0.2 MG/DL
BUN SERPL-MCNC: 6 MG/DL
CALCIUM SERPL-MCNC: 8.4 MG/DL
CHLORIDE SERPL-SCNC: 108 MMOL/L
CHOLEST SERPL-MCNC: 215 MG/DL
CHOLEST/HDLC SERPL: 4.1 {RATIO}
CO2 SERPL-SCNC: 21 MMOL/L
CREAT SERPL-MCNC: 0.6 MG/DL
DIFFERENTIAL METHOD: ABNORMAL
EOSINOPHIL # BLD AUTO: 0.1 K/UL
EOSINOPHIL NFR BLD: 0.6 %
ERYTHROCYTE [DISTWIDTH] IN BLOOD BY AUTOMATED COUNT: 12.9 %
EST. GFR  (AFRICAN AMERICAN): >60 ML/MIN/1.73 M^2
EST. GFR  (NON AFRICAN AMERICAN): >60 ML/MIN/1.73 M^2
ESTIMATED AVG GLUCOSE: 82 MG/DL
GLUCOSE SERPL-MCNC: 71 MG/DL
HBA1C MFR BLD HPLC: 4.5 %
HCT VFR BLD AUTO: 33.9 %
HDLC SERPL-MCNC: 52 MG/DL
HDLC SERPL: 24.2 %
HGB BLD-MCNC: 11.2 G/DL
IMM GRANULOCYTES # BLD AUTO: 0.05 K/UL
IMM GRANULOCYTES NFR BLD AUTO: 0.6 %
LDLC SERPL CALC-MCNC: 126.8 MG/DL
LYMPHOCYTES # BLD AUTO: 1.6 K/UL
LYMPHOCYTES NFR BLD: 18.5 %
MCH RBC QN AUTO: 30 PG
MCHC RBC AUTO-ENTMCNC: 33 G/DL
MCV RBC AUTO: 91 FL
MONOCYTES # BLD AUTO: 0.7 K/UL
MONOCYTES NFR BLD: 8.8 %
NEUTROPHILS # BLD AUTO: 6 K/UL
NEUTROPHILS NFR BLD: 71.1 %
NONHDLC SERPL-MCNC: 163 MG/DL
NRBC BLD-RTO: 0 /100 WBC
PLATELET # BLD AUTO: 305 K/UL
PMV BLD AUTO: 10.7 FL
POTASSIUM SERPL-SCNC: 3.6 MMOL/L
PROT SERPL-MCNC: 6.5 G/DL
RBC # BLD AUTO: 3.73 M/UL
SODIUM SERPL-SCNC: 138 MMOL/L
TRIGL SERPL-MCNC: 181 MG/DL
TSH SERPL DL<=0.005 MIU/L-ACNC: 1.07 UIU/ML
WBC # BLD AUTO: 8.4 K/UL

## 2018-04-25 PROCEDURE — 99999 PR PBB SHADOW E&M-EST. PATIENT-LVL III: CPT | Mod: PBBFAC,,, | Performed by: FAMILY MEDICINE

## 2018-04-25 PROCEDURE — 86592 SYPHILIS TEST NON-TREP QUAL: CPT

## 2018-04-25 PROCEDURE — 86803 HEPATITIS C AB TEST: CPT

## 2018-04-25 PROCEDURE — 86703 HIV-1/HIV-2 1 RESULT ANTBDY: CPT

## 2018-04-25 PROCEDURE — 82306 VITAMIN D 25 HYDROXY: CPT

## 2018-04-25 PROCEDURE — 84443 ASSAY THYROID STIM HORMONE: CPT

## 2018-04-25 PROCEDURE — 80053 COMPREHEN METABOLIC PANEL: CPT

## 2018-04-25 PROCEDURE — 80061 LIPID PANEL: CPT

## 2018-04-25 PROCEDURE — 99204 OFFICE O/P NEW MOD 45 MIN: CPT | Mod: S$PBB,,, | Performed by: FAMILY MEDICINE

## 2018-04-25 PROCEDURE — 85025 COMPLETE CBC W/AUTO DIFF WBC: CPT

## 2018-04-25 PROCEDURE — 83036 HEMOGLOBIN GLYCOSYLATED A1C: CPT

## 2018-04-25 PROCEDURE — 99213 OFFICE O/P EST LOW 20 MIN: CPT | Mod: PBBFAC,PO | Performed by: FAMILY MEDICINE

## 2018-04-25 PROCEDURE — 36415 COLL VENOUS BLD VENIPUNCTURE: CPT | Mod: PO

## 2018-04-25 NOTE — PROGRESS NOTES
"[facility]  Progress Note    Subjective:       Patient ID: Syeda Gonzalez is a 23 y.o. female.    Chief Complaint: Establish Care    HPI23 y.o.female is here today to establish care.  Patient has a history of epilepsy and depression.  Patient is currently on Prozac and zonisamide.  Patient has discussed medications with OB/GYN and neurology.  Patient has had discussion with OB/GYN regarding potential risks of taking these medications while pregnant.  The patient had an abrupt death of her fiance which is main reason why she has developed depression.  Patient is due for annual lab work today.  No further complaints at today's visit.  Review of Systems   Constitutional: Negative for chills, fatigue and fever.   HENT: Negative for congestion, ear pain, postnasal drip, sinus pressure, sneezing and sore throat.    Eyes: Negative for pain.   Respiratory: Negative for cough, shortness of breath and wheezing.    Cardiovascular: Negative for chest pain, palpitations and leg swelling.   Gastrointestinal: Negative for abdominal distention, abdominal pain, constipation, diarrhea, nausea and vomiting.   Genitourinary: Negative for difficulty urinating.   Musculoskeletal: Negative for back pain and myalgias.   Skin: Negative for rash.   Neurological: Negative for dizziness, seizures, syncope, weakness and numbness.   Psychiatric/Behavioral: Negative for sleep disturbance. The patient is not nervous/anxious.        Objective:      Vitals:    04/25/18 1400   BP: 104/66   BP Location: Right arm   Patient Position: Sitting   BP Method: Small (Automatic)   Pulse: 92   Temp: 97.7 °F (36.5 °C)   TempSrc: Oral   Weight: 82.8 kg (182 lb 8.7 oz)   Height: 5' 2" (1.575 m)     Body mass index is 33.39 kg/m².  Physical Exam   Constitutional: She is oriented to person, place, and time. She appears well-developed and well-nourished.   HENT:   Head: Normocephalic and atraumatic.   Eyes: Conjunctivae and EOM are normal. Pupils are " equal, round, and reactive to light.   Neck: Normal range of motion. Neck supple. No JVD present.   Cardiovascular: Normal rate, regular rhythm, normal heart sounds and intact distal pulses.  Exam reveals no gallop and no friction rub.    No murmur heard.  Pulmonary/Chest: Effort normal. No respiratory distress. She has no wheezes.   Abdominal: Soft. Bowel sounds are normal. There is no tenderness. There is no rebound and no guarding.   Musculoskeletal: Normal range of motion.   Neurological: She is alert and oriented to person, place, and time. No cranial nerve deficit.   Skin: Skin is warm and dry.   Psychiatric: She has a normal mood and affect. Her behavior is normal. Judgment and thought content normal.   Nursing note and vitals reviewed.      Assessment:       1. Partial symptomatic epilepsy with complex partial seizures, not intractable, without status epilepticus    2. Depression during pregnancy in third trimester    3. Annual physical exam    4. Preventive measure    5. Encounter for screening for HIV        Plan:       Partial symptomatic epilepsy with complex partial seizures, not intractable, without status epilepticus  -     CBC auto differential; Future; Expected date: 04/25/2018  -     Comprehensive metabolic panel; Future; Expected date: 04/25/2018    Depression during pregnancy in third trimester  -     TSH; Future; Expected date: 04/25/2018        - Well controlled continue current medications    Annual physical exam  -     Hemoglobin A1c; Future; Expected date: 04/25/2018  -     Lipid panel; Future; Expected date: 04/25/2018  -     Vitamin D; Future; Expected date: 04/25/2018    Preventive measure  -     Hepatitis C antibody; Future; Expected date: 04/25/2018  -     RPR; Future; Expected date: 04/25/2018    Encounter for screening for HIV  -     HIV-1 and HIV-2 antibodies; Future; Expected date: 04/25/2018      Follow-up in about 6 months (around 10/25/2018).  Phil Parra MD  [facility]  4/25/2018 4:16 PM

## 2018-04-26 LAB
HCV AB SERPL QL IA: NEGATIVE
HIV 1+2 AB+HIV1 P24 AG SERPL QL IA: NEGATIVE
RPR SER QL: NORMAL

## 2018-07-24 ENCOUNTER — TELEPHONE (OUTPATIENT)
Dept: NEUROLOGY | Facility: CLINIC | Age: 23
End: 2018-07-24

## 2018-07-24 NOTE — TELEPHONE ENCOUNTER
----- Message from Joycelynmando Collier sent at 7/24/2018 12:57 PM CDT -----  Contact: [phone]  Patient states that she need to see the doctor as soon as possible for a f/u visit/seizures.  Please call patient at [phone].

## 2018-07-24 NOTE — TELEPHONE ENCOUNTER
Spoke with the pt, report she had a seizure approx 2 weeks before her son was born. Requesting a follow up appt . Appt scheduled.

## 2018-07-26 ENCOUNTER — TELEPHONE (OUTPATIENT)
Dept: FAMILY MEDICINE | Facility: CLINIC | Age: 23
End: 2018-07-26

## 2018-07-26 DIAGNOSIS — F33.9 EPISODE OF RECURRENT MAJOR DEPRESSIVE DISORDER, UNSPECIFIED DEPRESSION EPISODE SEVERITY: Primary | ICD-10-CM

## 2018-07-26 NOTE — TELEPHONE ENCOUNTER
Patient called requesting a referral with Dr. Sidhu for severe depression. Patient last saw Fidel in April 2018 and he had placed depression in 3rd trimester dx in her chart but there were no referrals placed for psych. Patient has a f/u appt with you on 10/19/18. Do you need her to come in to further discuss?

## 2018-07-26 NOTE — TELEPHONE ENCOUNTER
"Spoke with patient and advised of appt tomorrow. Patient is not currently having any thoughts of harming herself or others. She does report that she has had thoughts only in the past that she would be better off dead but never had a plan or acted out on it. Patient resides with her andrzej's mother (andrzej passed away suddenly in Feb and she was already 4 months pregnant at the time). His mom is a great support system for her and they have implemented an "open door" policy since her depression has gotten worse and she is never behind closed doors even while bathing. Patient verbalizes understanding that if she started with any thoughts of harming herself or others she will go straight to the ER. Patient has been discussing this with her OB Dr. Veliz and he recommends strongly on her seeing psych since the depression is more than postpartum depression.   "

## 2018-07-26 NOTE — TELEPHONE ENCOUNTER
----- Message from Rain Coronado sent at 7/26/2018  1:51 PM CDT -----  Contact: self [phone]  Call placed to pod. Patient returned your call, please call back.  Thank you!

## 2018-07-26 NOTE — TELEPHONE ENCOUNTER
----- Message from Danae Edwards LPN sent at 7/26/2018  8:18 AM CDT -----  Contact: Syeda      ----- Message -----  From: Danae Edwards LPN  Sent: 7/25/2018   4:28 PM  To: Danae Edwards LPN        ----- Message -----  From: Dinh Huber  Sent: 7/25/2018   3:22 PM  To: Jeff Spencer Staff    Syeda is needing a referral to be faxed over to Dr. Song for psychiatry in [place]. She said she is severely depressed and that needs to be the diagnosis. Please call her [phone] (home)   thanks

## 2018-07-26 NOTE — TELEPHONE ENCOUNTER
Needs to be seen here ASAP -   I will order referral as well   Would also recommend discussion with her gyn   Please screen for suicidal/ homicidal ideation  Will also refer to case management for additional resources

## 2018-07-27 ENCOUNTER — OFFICE VISIT (OUTPATIENT)
Dept: FAMILY MEDICINE | Facility: CLINIC | Age: 23
End: 2018-07-27
Payer: MEDICAID

## 2018-07-27 VITALS
HEIGHT: 62 IN | HEART RATE: 103 BPM | DIASTOLIC BLOOD PRESSURE: 78 MMHG | BODY MASS INDEX: 30.34 KG/M2 | SYSTOLIC BLOOD PRESSURE: 109 MMHG | WEIGHT: 164.88 LBS | TEMPERATURE: 98 F

## 2018-07-27 DIAGNOSIS — D22.9 ATYPICAL NEVUS: ICD-10-CM

## 2018-07-27 DIAGNOSIS — F32.A DEPRESSION, UNSPECIFIED DEPRESSION TYPE: ICD-10-CM

## 2018-07-27 DIAGNOSIS — F43.21 COMPLICATED GRIEF: Primary | ICD-10-CM

## 2018-07-27 DIAGNOSIS — D22.9 MULTIPLE NEVI: ICD-10-CM

## 2018-07-27 PROCEDURE — 99999 PR PBB SHADOW E&M-EST. PATIENT-LVL IV: CPT | Mod: PBBFAC,,, | Performed by: NURSE PRACTITIONER

## 2018-07-27 PROCEDURE — 99214 OFFICE O/P EST MOD 30 MIN: CPT | Mod: S$PBB,,, | Performed by: NURSE PRACTITIONER

## 2018-07-27 PROCEDURE — 99214 OFFICE O/P EST MOD 30 MIN: CPT | Mod: PBBFAC,PO | Performed by: NURSE PRACTITIONER

## 2018-07-27 NOTE — PROGRESS NOTES
Subjective:       Patient ID: Syeda Gonzalez is a 23 y.o. female.    Chief Complaint: Depression    Ms. Gonzalez presents to the clinic today for depression which has been worsening over the past few months.  Her fiance passed away in February while the patient was pregnant.  Since then, the patient has given birth and is also taking care of her four year old son.  She has been very sad and sleeps most of the day.  She has had thoughts that she would be better off dead.  No suicidal plan and she states she would not do that because then her children would not have a mother or father.  She is taking fluoxetine and this was increased just yesterday to 20 mg daily.  She has not yet seen a counselor or therapist.  She lives with her fiance's mother, who has been encouraging her to eat since she has no appetite.  She would like a referral to Dr. Song.  Denies hallucinations or homicidal thoughts.  Does not have thoughts of hurting her children.  She also requests consult to Dermatology to check her multiple moles.  She is concerned about a mole which is two different colors on her right arm.      Review of Systems   Constitutional: Positive for activity change, appetite change, fatigue and unexpected weight change. Negative for chills and fever.   Respiratory: Negative for cough and shortness of breath.    Cardiovascular: Negative for chest pain, palpitations and leg swelling.   Gastrointestinal: Negative for abdominal pain, constipation and diarrhea.   Psychiatric/Behavioral: Positive for dysphoric mood. Negative for agitation, hallucinations and self-injury. The patient is not nervous/anxious.         Sometimes thinks would be better off dead.  No plan for suicide       Objective:      Physical Exam   Constitutional: She is oriented to person, place, and time. She appears well-nourished. No distress.   HENT:   Head: Normocephalic and atraumatic.   Right Ear: External ear normal.   Left Ear: External ear normal.    Mouth/Throat: Oropharynx is clear and moist. No oropharyngeal exudate.   Eyes: Pupils are equal, round, and reactive to light. Right eye exhibits no discharge. Left eye exhibits no discharge.   Neck: Neck supple. No thyromegaly present.   Cardiovascular: Normal rate and regular rhythm.  Exam reveals no gallop and no friction rub.    No murmur heard.  Pulmonary/Chest: Effort normal and breath sounds normal. No respiratory distress. She has no wheezes. She has no rales.   Abdominal: Soft. She exhibits no distension. There is no tenderness.   Lymphadenopathy:     She has no cervical adenopathy.   Neurological: She is alert and oriented to person, place, and time. Coordination normal.   Skin: Skin is warm and dry.        Psychiatric: She has a normal mood and affect. Her behavior is normal. Thought content normal. Her mood appears not anxious. Her affect is not inappropriate. Her speech is not rapid and/or pressured. She is not agitated, not aggressive, not hyperactive, not slowed, not withdrawn and not combative. Thought content is not paranoid and not delusional. She expresses no homicidal and no suicidal ideation. She expresses no suicidal plans and no homicidal plans.   Vitals reviewed.          Current Outpatient Prescriptions:     FLUoxetine (PROZAC) 10 MG capsule, Take 20 mg by mouth once daily., Disp: , Rfl:     zonisamide (ZONEGRAN) 100 MG Cap, Take 4 capsules (400 mg total) by mouth every evening. 3 caps PO nightly, Disp: 120 capsule, Rfl: 11  Assessment:       1. Complicated grief    2. Depression, unspecified depression type    3. Multiple nevi    4. Atypical nevus        Plan:       Complicated grief  Printed out list of grief therapists in [facility] who take Medicaid from [employer]  -     Ambulatory referral to Psychiatry    Depression, unspecified depression type  Continue Prozac which was just increased.   Verbal contract patient will not harm self.  RTC 2 weeks.  -     Ambulatory  referral to Psychiatry    Multiple nevi  -     Ambulatory referral to Dermatology    Atypical nevus  -     Ambulatory referral to Dermatology

## 2018-07-27 NOTE — PATIENT INSTRUCTIONS
Understanding Affective (Mood) Disorders  Most people have mood changes now and then. One day they may feel cranky and the next day they feel great. But with an affective disorder, mood changes aren't so simple. These disorders can cause great emotional pain, and can greatly disrupt your life. Affective disorders can be treated. Talk with your healthcare provider or a mental health professional. He or she can help.    What are affective disorders?  Affective disorders are illnesses that affect the way you think and feel. The symptoms may be quite severe. In most cases, they won't go away on their own. The most common affective disorders are depression and bipolar disorder.  · Depression. The main symptom of depression is a feeling of deep sadness. You may also feel hopeless, or that life isn't worth living. At times, you may have thoughts of suicide or death. Most people have some sadness in their lives. These feelings often lessen with time. But people with severe depression may not get better without treatment.  · Bipolar disorder. Bipolar disorder is sometimes called manic-depressive illness. That's because it causes extreme mood swings. At times you may feel intensely happy and full of energy. These episodes are often followed by great despair. In some cases, you may have both extremes at once. Its likely that you'll have phases when your mood shifts back and forth. You may have these mood swings just once in a while. Or they may happen a few times a year. Without treatment, they will likely keep happening throughout your life.  What causes affective disorders?  No one knows just what causes affective disorders. It is known they run in families. Changes in certain chemicals in your brain also may play a role. Major life changes, stress, trauma, certain physical illnesses, and medicines can each result in an affective disorder. These disorders affect both men and women. They also may strike people of every  age, race, and income level.  Date Last Reviewed: 5/1/2017  © 0938-8541 [employer]. [address] [place] [address], [place] [address]. All rights reserved. This information is not intended as a substitute for professional medical care. Always follow your healthcare professional's instructions.

## 2018-08-06 ENCOUNTER — OUTPATIENT CASE MANAGEMENT (OUTPATIENT)
Dept: ADMINISTRATIVE | Facility: OTHER | Age: 23
End: 2018-08-06

## 2018-08-06 NOTE — PROGRESS NOTES
Please note the following patients information has been forwarded to Medicaid for Case Management.    Please contact Outpatient Complex Care Management at ext [phone] with any questions.    Thank you,    Elise Sheikh, [facility]  Outpatient Complex Care Mgmt  [phone]

## 2018-08-08 ENCOUNTER — OFFICE VISIT (OUTPATIENT)
Dept: NEUROLOGY | Facility: CLINIC | Age: 23
End: 2018-08-08
Payer: MEDICAID

## 2018-08-08 VITALS
BODY MASS INDEX: 30.25 KG/M2 | HEART RATE: 91 BPM | RESPIRATION RATE: 17 BRPM | WEIGHT: 164.38 LBS | SYSTOLIC BLOOD PRESSURE: 115 MMHG | DIASTOLIC BLOOD PRESSURE: 73 MMHG | HEIGHT: 62 IN

## 2018-08-08 DIAGNOSIS — G40.209 PARTIAL SYMPTOMATIC EPILEPSY WITH COMPLEX PARTIAL SEIZURES, NOT INTRACTABLE, WITHOUT STATUS EPILEPTICUS: Primary | ICD-10-CM

## 2018-08-08 PROCEDURE — 99213 OFFICE O/P EST LOW 20 MIN: CPT | Mod: PBBFAC,PN | Performed by: PSYCHIATRY & NEUROLOGY

## 2018-08-08 PROCEDURE — 99215 OFFICE O/P EST HI 40 MIN: CPT | Mod: S$PBB,,, | Performed by: PSYCHIATRY & NEUROLOGY

## 2018-08-08 PROCEDURE — 99999 PR PBB SHADOW E&M-EST. PATIENT-LVL III: CPT | Mod: PBBFAC,,, | Performed by: PSYCHIATRY & NEUROLOGY

## 2018-08-08 NOTE — PROGRESS NOTES
"Date of service:  2018    Chief complaint:  Seizures    Interval history:  The patient is a 23 y.o. female seen previously for seizures.  I last saw her nearly a year ago.  She became pregnant.  She stopped taking her Zonegran against explicit medical advice to continue taking it, though she states that she resumed taking it later in pregnancy.  She also missed her scheduled appointments during the pregnancy.  She reports that she had a son about 7 weeks ago.  She indicates that he is healthy.  She did have a seizure in February and another 2 weeks before her son was born.    History of present illness:  The patient is a 23 y.o. female referred for evaluation of episodes suspicious for seizures.  She has seen Dr. St.  This is my first time seeing her.     She gives no clear history of aura.  She does report having felt abnormally cold throughout the day leading up to her seizures, though.  Her seizure is characterized by a loss of consciousness and reported convulsive activity.   She did have tongue biting on the sides.  This component of this spell lasted for what sounds to be a matter of minutes and occurred back to back.  Afterwards, she had fatigue/confusion.  The patient had, she believes, 3-4 events on the day of her hospitalization in May.  She did have a seizure about 4 years ago while on Ultram.  Also, she had some episode with loss of consciousness and convulsions during a blood draw about 3 years ago.    The patient has a possible family history of seizures ("I heard that my great-great-grandmother had epilepsy."  Also seizures in 2 distant cousins who happen to be on different sides of the family.).  She reports no history of prenatal/ complications. There is no history of febrile seizures.  She notes no history of CNS infections. She claims no history of significant head trauma. There is no history of developmental delay.    Current AEDs:  Zonegran    Prior AEDs:  Lamictal    Prior " "HPI from Dr. St (5/16):  "Patient is a RH 21 y.o. female presents with seizure activity. Pt with hx of past seizure precipitated from use of tramadol. On day of admission brought in with seizure activity, had second seizure event in ED, + tongue biting, no loss of bladder. No hx of head trauma, meningitis or febrile seizures. No illicit drug use or excessive alcohol. 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   Spoke with pt's matthew; she had two previous seizures, one from tramadol, one while giving blood. Recently has had some stressors, poor sleep. No new medications"      Past Medical History:   Diagnosis Date    Asthma     Depression affecting pregnancy     Mental disorder     Seizures     Epilepsy       Past Surgical History:   Procedure Laterality Date    ADENOIDECTOMY W/ MYRINGOTOMY AND TUBES      CHOLECYSTECTOMY         Family History   Problem Relation Age of Onset    Heart disease Father     Heart disease Maternal Grandfather     Heart disease Paternal Grandmother     Heart disease Paternal Grandfather     Congenital heart disease Neg Hx     Cardiomyopathy Neg Hx     Arrhythmia Neg Hx     Hypertension Neg Hx     Pacemaker/defibrilator Neg Hx        Social history:  Social History     Social History    Marital status: Single     Spouse name: N/A    Number of children: N/A    Years of education: N/A     Occupational History    Not on file.     Social History Main Topics    Smoking status: Never Smoker    Smokeless tobacco: Never Used    Alcohol use Yes      Comment: rarely    Drug use: No    Sexual activity: Yes     Partners: Male     Birth control/ protection: None      Comment:       Other Topics Concern    Not on file     Social History Narrative    Fob - lad coronary artery bridge, sister enlarged heart      Review of Systems   General/Constitutional:  No unintentional weight loss, + change in appetite  Eyes/Vision:  + change in vision, + double " "vision  ENT:  No frequent nose bleeds, No ringing in the ears  Respiratory:  No cough, No wheezing  Cardiovascular:  + chest pain, + palpitations  Gastrointestinal:  No jaundice, + nausea/vomiting  Genitourinary:  No incontinence, No burning with urination  Hematologic/Lymphatic:  No easy bruising/bleeding, No night sweats  Neurological:  No numbness, + weakness  Endocrine:  + fatigue, + heat/cold intolerance  Allergy/Immunologic:  No fevers, + chills  Musculoskeletal:  + muscle pain, No joint pain   Psychiatric:  No thoughts of harming self/others, + depression  Integumentary:  No rashes, No sores that do not heal       Physical exam:  /73 (BP Location: Right arm, Patient Position: Sitting, BP Method: Large (Automatic))   Pulse 91   Resp 17   Ht 5' 2" (1.575 m)   Wt 74.6 kg (164 lb 6.4 oz)   LMP 09/26/2017   BMI 30.07 kg/m²   General: Well developed, obese.  No acute distress.  HEENT: Atraumatic, normocephalic.  Neck: Supple, trachea midline.  Cardiovascular: Regular rate and rhythm.  Pulmonary: No increased work of breathing.  Abdomen/GI: No guarding.  Musculoskeletal: No obvious joint deformities, moves all extremities well.    Neurological exam:  Mental status: Awake and alert.  Oriented x4.  Speech fluent and appropriate.  Recent and remote memory appear to be intact.  Fund of knowledge normal.  Cranial nerves: Pupils equal round and reactive to light, extraocular movements intact, facial strength and sensation intact bilaterally, palate and tongue midline, hearing grossly intact bilaterally.  Motor: 5 out of 5 strength throughout the upper and lower extremities bilaterally. Normal bulk and tone.  Sensation: Intact to light touch and temperature bilaterally.  DTR: 2+ at the knees and biceps bilaterally.  Coordination: Finger-nose-finger testing intact bilaterally.  Gait: Normal gait. Good tandem.    Data base:  Notes of the referring physician were reviewed.  Briefly summarized, these indicate a " "belief that the patient has CPS with secondary generalization.    Labs (5/16):  CMP- unremarkable  CBC- unremarkable    MRI brain (5/16):  "Normal MRI of the brain with and without gadolinium"    EEG (5/16):  "IMPRESSION: This is an abnormal EEG during wakefulness, drowsiness and sleep. Independent left and right temporal focal slowing was noted. In addition, independent left and right temporal focal sharp waves were noted.  CLINICAL CORRELATION: The patient is a 21-year-old female with a history of seizures who is currently maintained on Keppra. This is an abnormal EEG during wakefulness, drowsiness and sleep. The presence of independent left and right temporal focal slowing is suggestive of focal neuronal dysfunction in these regions. The presence of independent left and right temporal sharp waves confers an increased risk of focal seizures from both temporal lobes. During this study, no seizures were recorded."    Assessment and plan:  The patient is a 23 y.o. female who presents for follow up on seizures and complaints of suspected medication side effects. I suspect that her events represent partial onset seizures with secondary generalization.  Based on the EEG, a temporal lobe focus is likely; however, it is not clear whether she has bilateral seizure foci or if all of her seizures originate from a single temporal lobe.  The etiology for her epilepsy is unclear.  I do not feel further workup is necessary at this time.  Should she fail multiple AEDs, I would advocate for an aggressive surgical evaluation, including inpatient vEEG monitoring, PET scan, neuropsychological evaluation, and so forth.  From a management perspective, we will continue her Zonegran 300 mg daily.  We will check labs and a DEXA for medication monitoring purposes.  Medication side effects were discussed with the patient.  State law as it pertains to driving for individuals with seizures has been discussed.  The patient has also been " counseled on seizure safety.  Teratogenicity of AEDs were discussed.  She was instructed to make sure she was utilizing a reliable means of birth control.  Should she decide to become pregnant, she is to contact us first, so we can determine what medication adjustments, if any, are appropriate.  The patient was counseled that the risks posed by uncontrolled seizures typically exceeds that posed by the medications themselves. Consequently, the patient was advised of the importance of continuing her anticonvulsant medication should she become pregnant.  She was advised to take supplemental folate at 1 mg daily.  Should she desire to become pregnant or actually become pregnant, we will plan on increasing it to 4 mg daily.  We also discussed lactation issues with AEDs.  I also discussed with the patient the need for a PCP.  We will plan on seeing the patient back in a few weeks.

## 2018-08-21 ENCOUNTER — HOSPITAL ENCOUNTER (EMERGENCY)
Facility: HOSPITAL | Age: 23
Discharge: PSYCHIATRIC OR SUBSTANCE USE FACILITY | End: 2018-08-21
Attending: EMERGENCY MEDICINE
Payer: MEDICAID

## 2018-08-21 VITALS
TEMPERATURE: 97 F | DIASTOLIC BLOOD PRESSURE: 70 MMHG | HEART RATE: 78 BPM | RESPIRATION RATE: 18 BRPM | HEIGHT: 62 IN | BODY MASS INDEX: 29.44 KG/M2 | OXYGEN SATURATION: 99 % | SYSTOLIC BLOOD PRESSURE: 108 MMHG | WEIGHT: 160 LBS

## 2018-08-21 DIAGNOSIS — R45.851 PASSIVE SUICIDAL IDEATIONS: Primary | ICD-10-CM

## 2018-08-21 DIAGNOSIS — F32.2 SEVERE DEPRESSION: ICD-10-CM

## 2018-08-21 DIAGNOSIS — F43.21 COMPLICATED GRIEVING: ICD-10-CM

## 2018-08-21 DIAGNOSIS — F41.0 PANIC ATTACKS: ICD-10-CM

## 2018-08-21 DIAGNOSIS — R45.851 SUICIDAL IDEATIONS: ICD-10-CM

## 2018-08-21 PROBLEM — F32.9 MAJOR DEPRESSION, SINGLE EPISODE: Status: ACTIVE | Noted: 2018-08-21

## 2018-08-21 LAB
ALBUMIN SERPL BCP-MCNC: 3.9 G/DL
ALP SERPL-CCNC: 92 U/L
ALT SERPL W/O P-5'-P-CCNC: 15 U/L
AMORPH CRY URNS QL MICRO: ABNORMAL
AMPHET+METHAMPHET UR QL: NEGATIVE
ANION GAP SERPL CALC-SCNC: 8 MMOL/L
APAP SERPL-MCNC: <3 UG/ML
AST SERPL-CCNC: 20 U/L
B-HCG UR QL: NEGATIVE
BACTERIA #/AREA URNS HPF: ABNORMAL /HPF
BARBITURATES UR QL SCN>200 NG/ML: NEGATIVE
BASOPHILS # BLD AUTO: 0 K/UL
BASOPHILS NFR BLD: 0.6 %
BENZODIAZ UR QL SCN>200 NG/ML: NEGATIVE
BILIRUB SERPL-MCNC: 0.3 MG/DL
BILIRUB UR QL STRIP: NEGATIVE
BUN SERPL-MCNC: 14 MG/DL
BZE UR QL SCN: NEGATIVE
CALCIUM SERPL-MCNC: 9.2 MG/DL
CANNABINOIDS UR QL SCN: NEGATIVE
CHLORIDE SERPL-SCNC: 109 MMOL/L
CLARITY UR: ABNORMAL
CO2 SERPL-SCNC: 23 MMOL/L
COLOR UR: YELLOW
CREAT SERPL-MCNC: 0.7 MG/DL
CREAT UR-MCNC: 99.1 MG/DL
CTP QC/QA: YES
DIFFERENTIAL METHOD: ABNORMAL
EOSINOPHIL # BLD AUTO: 0.1 K/UL
EOSINOPHIL NFR BLD: 1.3 %
ERYTHROCYTE [DISTWIDTH] IN BLOOD BY AUTOMATED COUNT: 13 %
EST. GFR  (AFRICAN AMERICAN): >60 ML/MIN/1.73 M^2
EST. GFR  (NON AFRICAN AMERICAN): >60 ML/MIN/1.73 M^2
ETHANOL SERPL-MCNC: <10 MG/DL
GLUCOSE SERPL-MCNC: 77 MG/DL
GLUCOSE UR QL STRIP: NEGATIVE
HCT VFR BLD AUTO: 35.8 %
HGB BLD-MCNC: 11.6 G/DL
HGB UR QL STRIP: NEGATIVE
KETONES UR QL STRIP: NEGATIVE
LEUKOCYTE ESTERASE UR QL STRIP: NEGATIVE
LYMPHOCYTES # BLD AUTO: 1.8 K/UL
LYMPHOCYTES NFR BLD: 33.4 %
MCH RBC QN AUTO: 27.6 PG
MCHC RBC AUTO-ENTMCNC: 32.4 G/DL
MCV RBC AUTO: 85 FL
METHADONE UR QL SCN>300 NG/ML: NEGATIVE
MICROSCOPIC COMMENT: ABNORMAL
MONOCYTES # BLD AUTO: 0.5 K/UL
MONOCYTES NFR BLD: 8.9 %
NEUTROPHILS # BLD AUTO: 2.9 K/UL
NEUTROPHILS NFR BLD: 55.8 %
NITRITE UR QL STRIP: NEGATIVE
OPIATES UR QL SCN: NEGATIVE
PCP UR QL SCN>25 NG/ML: NEGATIVE
PH UR STRIP: 8 [PH] (ref 5–8)
PLATELET # BLD AUTO: 322 K/UL
PMV BLD AUTO: 8.4 FL
POTASSIUM SERPL-SCNC: 3.9 MMOL/L
PROT SERPL-MCNC: 7.1 G/DL
PROT UR QL STRIP: NEGATIVE
RBC # BLD AUTO: 4.2 M/UL
RBC #/AREA URNS HPF: 1 /HPF (ref 0–4)
SODIUM SERPL-SCNC: 140 MMOL/L
SP GR UR STRIP: 1.01 (ref 1–1.03)
SQUAMOUS #/AREA URNS HPF: 5 /HPF
TOXICOLOGY INFORMATION: NORMAL
TSH SERPL DL<=0.005 MIU/L-ACNC: 1.22 UIU/ML
URN SPEC COLLECT METH UR: ABNORMAL
UROBILINOGEN UR STRIP-ACNC: NEGATIVE EU/DL
WBC # BLD AUTO: 5.3 K/UL
WBC #/AREA URNS HPF: 1 /HPF (ref 0–5)

## 2018-08-21 PROCEDURE — 81000 URINALYSIS NONAUTO W/SCOPE: CPT | Mod: 59

## 2018-08-21 PROCEDURE — 80053 COMPREHEN METABOLIC PANEL: CPT

## 2018-08-21 PROCEDURE — 81025 URINE PREGNANCY TEST: CPT | Performed by: EMERGENCY MEDICINE

## 2018-08-21 PROCEDURE — 80320 DRUG SCREEN QUANTALCOHOLS: CPT

## 2018-08-21 PROCEDURE — 85025 COMPLETE CBC W/AUTO DIFF WBC: CPT

## 2018-08-21 PROCEDURE — 25000003 PHARM REV CODE 250: Performed by: EMERGENCY MEDICINE

## 2018-08-21 PROCEDURE — 80329 ANALGESICS NON-OPIOID 1 OR 2: CPT

## 2018-08-21 PROCEDURE — 84443 ASSAY THYROID STIM HORMONE: CPT

## 2018-08-21 PROCEDURE — 80307 DRUG TEST PRSMV CHEM ANLYZR: CPT

## 2018-08-21 PROCEDURE — 99284 EMERGENCY DEPT VISIT MOD MDM: CPT | Mod: GT,,, | Performed by: PSYCHIATRY & NEUROLOGY

## 2018-08-21 PROCEDURE — 99285 EMERGENCY DEPT VISIT HI MDM: CPT

## 2018-08-21 PROCEDURE — 36415 COLL VENOUS BLD VENIPUNCTURE: CPT

## 2018-08-21 RX ORDER — ACETAMINOPHEN 325 MG/1
650 TABLET ORAL
Status: COMPLETED | OUTPATIENT
Start: 2018-08-21 | End: 2018-08-21

## 2018-08-21 RX ADMIN — ACETAMINOPHEN 650 MG: 325 TABLET ORAL at 04:08

## 2018-08-21 NOTE — ED NOTES
Assumed care from Martin:  Patient awake, alert and oriented x 3, skin warm and dry, in NAD with sitter at bedside.  Awaiting placement

## 2018-08-21 NOTE — ED NOTES
"Presents to ED via [facility] with c/o depression. Pt 7 weeks postpartum. Feels as if she yells to much at her kids and states she would rather be dead than be that type of mother. Does not state that she is suicidal when asked directly. States, " I just know things cant go on this way."   "

## 2018-08-21 NOTE — ED PROVIDER NOTES
"Encounter Date: 2018    SCRIBE #1 NOTE: I, Jason Rutledge, am scribing for, and in the presence of, Dr. Bravo .       History     Chief Complaint   Patient presents with    Depression     7 weeks postpartum / restarted prozac 20 mg last night after being out of medication x 1 week        Time seen by provider: 2:25 PM on 2018    Syeda Gonzalez is a 7 week post partum 23 y.o. female with a hx of Epilepsy and Depression affecting pregnancy who presents to the ED with c/o depression and SI for approximately 6 months. She was seen by her OB for the depression and was started on Prozac. She was advised to take an additional dose a day 2 weeks ago after c/o SI. Pt has not noticed a difference since doubling on the medication. Due to taking twice the prescribed dosage she ran out of her medication and did not have any medication for a week. She filled her prescription yesterday and her last dosage was last night. She was also advised to be seen by a psychiatrist but has not been able to do so. She states the next available appointment "isn't until the end of the year."  Depression started after he children's father  of a MI 6 months ago. She was pregnant at the time. This morning, she yelled at her 7 week old because he "wouldn't shut up." She states the baby looked at her, started crying harder, and she began to cry with him. Pt also yelled at her toddler for "taking too long to get ready for school." She denies thought of hurting her kids and states she "would rather hurt myself." Her infant is formula fed. Pt has occasional assistance from her mother in law when she is not working. She lives with mother in law and moved in after the death of her children's father due to trouble paying rent. Pt reports decrease in appetite and states her mother in law has to force her to eat. She also notes trouble sleeping due to the infant getting up every 3 hours. Allergens include Benadryl, Quinolones, and " Tramadol.       The history is provided by the patient.     Review of patient's allergies indicates:   Allergen Reactions    Benadryl [diphenhydramine hcl] Other (See Comments)     Lowers seizure threshold.    Quinolones Other (See Comments)     Lowers seizure threshold.     Tramadol Other (See Comments)     seizures     Past Medical History:   Diagnosis Date    Asthma     Depression affecting pregnancy     Mental disorder     Seizures     Epilepsy     Past Surgical History:   Procedure Laterality Date    ADENOIDECTOMY W/ MYRINGOTOMY AND TUBES      CHOLECYSTECTOMY       Family History   Problem Relation Age of Onset    Heart disease Father     Heart disease Maternal Grandfather     Heart disease Paternal Grandmother     Heart disease Paternal Grandfather     Congenital heart disease Neg Hx     Cardiomyopathy Neg Hx     Arrhythmia Neg Hx     Hypertension Neg Hx     Pacemaker/defibrilator Neg Hx      Social History     Tobacco Use    Smoking status: Never Smoker    Smokeless tobacco: Never Used   Substance Use Topics    Alcohol use: Yes     Comment: rarely    Drug use: No     Review of Systems   Constitutional: Positive for appetite change. Negative for fever.   HENT: Negative for sore throat.    Eyes: Negative for redness.   Respiratory: Negative for shortness of breath.    Cardiovascular: Negative for chest pain.   Gastrointestinal: Negative for nausea.   Genitourinary: Negative for dysuria.   Musculoskeletal: Negative for back pain.   Skin: Negative for rash.   Neurological: Negative for weakness.   Hematological: Does not bruise/bleed easily.   Psychiatric/Behavioral: Positive for dysphoric mood (Depression ) and suicidal ideas. Negative for hallucinations.       Physical Exam     Initial Vitals [08/21/18 1405]   BP Pulse Resp Temp SpO2   (!) 152/83 80 18 97.9 °F (36.6 °C) 100 %      MAP       --         Physical Exam    Nursing note and vitals reviewed.  Constitutional: She appears  well-developed and well-nourished.  Non-toxic appearance. No distress.   HENT:   Head: Normocephalic and atraumatic.   Eyes: EOM are normal. Pupils are equal, round, and reactive to light.   Neck: Normal range of motion. Neck supple. No neck rigidity. No JVD present.   Cardiovascular: Normal rate, regular rhythm, normal heart sounds and intact distal pulses. Exam reveals no gallop and no friction rub.    No murmur heard.  Pulmonary/Chest: Breath sounds normal. She has no wheezes. She has no rhonchi. She has no rales.   Abdominal: Soft. Bowel sounds are normal. She exhibits no distension. There is no tenderness. There is no rigidity, no rebound and no guarding.   Musculoskeletal: Normal range of motion.   Neurological: She is alert and oriented to person, place, and time. She has normal strength and normal reflexes. No cranial nerve deficit or sensory deficit. She exhibits normal muscle tone. Coordination normal. GCS eye subscore is 4. GCS verbal subscore is 5. GCS motor subscore is 6.   Skin: Skin is warm and dry.   Psychiatric: Her speech is normal and behavior is normal. She is not actively hallucinating. She exhibits a depressed mood. She expresses suicidal ideation.   Pt is tearful.         ED Course   Procedures  Labs Reviewed   CBC W/ AUTO DIFFERENTIAL - Abnormal; Notable for the following components:       Result Value    Hemoglobin 11.6 (*)     Hematocrit 35.8 (*)     MPV 8.4 (*)     All other components within normal limits   URINALYSIS, REFLEX TO URINE CULTURE - Abnormal; Notable for the following components:    Appearance, UA Cloudy (*)     All other components within normal limits    Narrative:     Preferred Collection Type->Urine, Clean Catch   ACETAMINOPHEN LEVEL - Abnormal; Notable for the following components:    Acetaminophen (Tylenol), Serum <3.0 (*)     All other components within normal limits   URINALYSIS MICROSCOPIC - Abnormal; Notable for the following components:    Bacteria, UA Few (*)      Amorphous, UA Many (*)     All other components within normal limits    Narrative:     Preferred Collection Type->Urine, Clean Catch   COMPREHENSIVE METABOLIC PANEL   TSH   DRUG SCREEN PANEL, URINE EMERGENCY    Narrative:     Preferred Collection Type->Urine, Clean Catch   ALCOHOL,MEDICAL (ETHANOL)   POCT URINE PREGNANCY          Imaging Results    None          Medical Decision Making:   History:   Old Medical Records: I decided to obtain old medical records.  Initial Assessment:   This is an emergent evaluation for psychiatric evaluation.  The pt presents for evaluation of suicidal ideation, severe depression.    I feel the pt is severly depressed and has expressed passive suicidal ideation and pt was placed under PEC with direct observation.  Medical workup was initiated to evaluate for organic etiologies.  Pt's etoh level was neg  I do not believe the pt has metabolic encephalopathy, CVA, severe electrolyte derrangement, drug induced temporary psychosis.    Pt is medically [id number] for transfer to psych facility.    Clinical Tests:   Lab Tests: Ordered and Reviewed            Scribe Attestation:   Scribe #1: I performed the above scribed service and the documentation accurately describes the services I performed. I attest to the accuracy of the note.    I, Lawrence Saleem, personally performed the services described in this documentation. All medical record entries made by the scribe were at my direction and in my presence.  I have reviewed the chart and agree that the record reflects my personal performance and is accurate and complete. Ha Bravo MD.  5:51 PM 08/21/2018             Clinical Impression:     1. Passive suicidal ideations    2. Severe depressive episode without psychotic symptoms in postpartum period    3. Complicated grieving    4. Panic attacks    5. Suicidal ideations    6. Severe depression                                 Ha Bravo MD  08/21/18 [id number]

## 2018-08-21 NOTE — ED NOTES
Admit packet faxed to [facility], [facility], [facility], and [facility].

## 2018-08-21 NOTE — CONSULTS
"Tele-Consultation to Emergency Department from Psychiatry    Please see previous notes:    Patient agreeable to consultation via telepsychiatry.    Consultation started: 2018 at 3:53pm  The chief complaint leading to psychiatric consultation is:depression / SI  This consultation was requested by Dr. Ha Bravo, the Emergency Department attending physician.  The location of the consulting psychiatrist is [address] [place] [address].  The patient location is [facility].  The patient arrived at the ED at:2:40pm  Also present with the patient at the time of the consultation:none    Patient Identification:  Syeda Gonzalez is a 23 y.o. female.    Patient information was obtained from patient and past medical records.  Patient presented voluntarily to the Emergency Department ambulatory.    History of Present Illness:  Today was "a normal morning" but reports she was getting frustrated at her children. She states that she found herself yelling at them and feeling angry towards them. Pt reports telling her mother in law who recommended pt come in to the ED. Pt states that recently she has had multiple major stressors in her life. She reports that 9 weeks ago pts matthew and father of her children  after an acute MI that was sudden and was due to some genetic disorder that they discovered post mortem. Pt reports that she was in the hospital at the time of his death because she had a seizure and was pregnant at the time. She reports being devastated after this and her mood continues to decline. She states she gave birth 7 weeks ago and it has been very challenging to take care of the new born and 4 yrs child all by herself.    She recenly found out her 4 yr old son also has the same heart defect as her andrzeje and her new born also may have some seizure risk.   She stated several times " I am all alone" She also reports that recently she has been having worsening of her suicidal ideations " "from passive to more active. She stated last week she was thinking of drowning in the bathtub. She also made several concerning statements such as " My kids will be better off without me" " I am lost" and "I cant even remember the last time I even felt happy"   Pt states that she is trying to get mental health help but has not been successful. She states that her OB Gyn started her on Prozac and later her PCP increased it to 40mg .  She did states that she is non complaint with her medication and ran out of Prozac a week ago and didn't ask her PCP to reorder it in time so she was out of the medication for over a week. She also admitted that she sometimes doesn't want to take her seizure medicine because she feels "whats the point if I do or not"     Symptoms of Depression  She complains of anhedonia, depressed mood, difficulty concentrating, fatigue, feelings of worthlessness/guilt, hopelessness, insomnia, psychomotor retardation, recurrent thoughts of death, suicidal thoughts without plan and weight loss. Onset was approximately several months ago. Symptoms have been rapidly worsening since that time.  Family history significant for depression and substance abuse. Possible organic causes contributing are: medications, endocrine/metabolic, nutritional. Risk factors: positive family history in  father and mother, negative life event recent death of fiancee and children with medical illness and previous episode of depression. Previous treatment includes medication.     Symptoms of NBA: She reports some excessive anxiety/worry/fear, more days than not, about numerous issues, difficult to control, with restlessness, fatigue, poor concentration, irritability, muscle tension, sleep disturbance; causes functionally impairing distress   She also reports having increased frequency of panic attacks recently  describes them as having chest pain, SOB and "feeling like I am having a heart attack"    Psychiatric History: "   Hospitalization: No  Medication Trials: Yes  Suicide Attempts: no  Violence: none  Depression: yes see HPI  Herlinda: none  AH's: none  Delusions: none    Review of Systems:  General ROS: negative  Respiratory ROS: no cough, shortness of breath, or wheezing  Cardiovascular ROS: positive for - chest pain  negative for - dyspnea on exertion or palpitations  Gastrointestinal ROS: no abdominal pain, change in bowel habits, or black or bloody stools  Neurological ROS: no TIA or stroke symptoms    Past Medical History:   Past Medical History:   Diagnosis Date    Asthma     Depression affecting pregnancy     Mental disorder     Seizures     Epilepsy        Seizures: yes most recent 9 weeks ago  Head trauma/l.o.c.: none  Wish to become pregnant[if female of childbearing age]: didn't assess    Allergies:   Review of patient's allergies indicates:   Allergen Reactions    Benadryl [diphenhydramine hcl] Other (See Comments)     Lowers seizure threshold.    Quinolones Other (See Comments)     Lowers seizure threshold.     Tramadol Other (See Comments)     seizures       Medications in ER:   Medications   acetaminophen tablet 650 mg (not administered)       Medications at home:   Seizure medication and Prozac recently ran out    Substance Abuse History:   Alchohol: denied  Drug: denied    Legal History:   Past charges/incarcerations: none  Pending charges: none    Family Psychiatric History: mother- depression    Social History:   History of Physical/Sexual Abuse:denied  Education: some college  Employment/Disability: none   Financial: difficult  Relationship Status/Sexual Orientation:    Children: 2 - 4 yr old who is with pts aunt and 7 week old who is with pts mother inlaw   Housing Status: with mother in law  Muslim: affiliated with organized [employer] but has not been going to [employer]    History: none  Recreational Activities: spending time with family  Access to Gun: denied    Current Evaluation:  "    Constitutional  Vitals:  Vitals:    08/21/18 1405   BP: (!) 152/83   Pulse: 80   Resp: 18   Temp: 97.9 °F (36.6 °C)   SpO2: 100%   Weight: 72.6 kg (160 lb)   Height: 5' 2" (1.575 m)      General:  unremarkable, age appropriate     Musculoskeletal  Muscle Strength/Tone:   moving arms normally   Gait & Station:   sitting on stretcher     Psychiatric  Level of Consciousness: alert  Orientation: oriented to person, place and time  Grooming: in hospital gown  Psychomotor Behavior: no agitation + PMR  Speech: Slowed rate, soft volume and monotone  Language: uses words appropriately  Mood: "depressed"  Affect: dysthymic, flat, blunted, tearful  Thought Process: linear but some increased latency in response  Associations: none  Thought Content: Passive Si/ no HI/AVH   Memory: intact  Attention: limited needed to repeat some questions  Fund of Knowledge: appears adequate  Insight: limited  Judgement: limited    Relevant Elements of Neurological Exam: no abnormality of posture noted    Assessment - Diagnosis - Goals:     Diagnosis/Impression:  MDD severe, postpartum without psychotic features  Complicated grief  Panic attacks    Rec:   Dispo- Once medically cleared; Seek Involuntary Inpt psychiatric admission for stabilization of acute psychiatric symptoms.  Please re-consult for further follow up or reassessment     Psych meds- hold Prozac; PRN Zyprexa 5mg q6 PO/IM for non redirectable agitation ; do not combine or administer within one hour of giving a Benzodiazepine     Legal-Seek/continue PEC because pt is in imminent danger of hurting self or others and is gravely disabled.     More than 50% of the time was spent counseling/coordinating care    Laboratory Data:   Labs Reviewed   CBC W/ AUTO DIFFERENTIAL - Abnormal; Notable for the following components:       Result Value    Hemoglobin 11.6 (*)     Hematocrit 35.8 (*)     MPV 8.4 (*)     All other components within normal limits   URINALYSIS, REFLEX TO URINE CULTURE - " Abnormal; Notable for the following components:    Appearance, UA Cloudy (*)     All other components within normal limits    Narrative:     Preferred Collection Type->Urine, Clean Catch   ACETAMINOPHEN LEVEL - Abnormal; Notable for the following components:    Acetaminophen (Tylenol), Serum <3.0 (*)     All other components within normal limits   URINALYSIS MICROSCOPIC - Abnormal; Notable for the following components:    Bacteria, UA Few (*)     Amorphous, UA Many (*)     All other components within normal limits    Narrative:     Preferred Collection Type->Urine, Clean Catch   COMPREHENSIVE METABOLIC PANEL   TSH   DRUG SCREEN PANEL, URINE EMERGENCY    Narrative:     Preferred Collection Type->Urine, Clean Catch   ALCOHOL,MEDICAL (ETHANOL)   POCT URINE PREGNANCY         Consulting clinician was informed of the encounter and consult note.    Consultation ended: 8/21/2018 at 4:32pm

## 2018-08-22 PROBLEM — D64.9 ANEMIA: Status: ACTIVE | Noted: 2018-08-22

## 2018-08-22 PROBLEM — G40.909 SEIZURE DISORDER: Status: ACTIVE | Noted: 2018-08-22

## 2018-08-22 NOTE — ED NOTES
Report called to [facility], [facility] notified for transport.

## 2018-08-22 NOTE — ED NOTES
Patient accepted to [facility] in [place]. Accepting Md is Dr. Harrison. Call report to [phone].

## 2018-09-10 ENCOUNTER — HOSPITAL ENCOUNTER (EMERGENCY)
Facility: HOSPITAL | Age: 23
Discharge: HOME | End: 2018-09-10
Attending: EMERGENCY MEDICINE
Payer: MEDICAID

## 2018-09-10 VITALS
DIASTOLIC BLOOD PRESSURE: 79 MMHG | OXYGEN SATURATION: 100 % | BODY MASS INDEX: 29.06 KG/M2 | HEIGHT: 63 IN | TEMPERATURE: 99 F | HEART RATE: 102 BPM | WEIGHT: 164 LBS | RESPIRATION RATE: 16 BRPM | SYSTOLIC BLOOD PRESSURE: 116 MMHG

## 2018-09-10 DIAGNOSIS — G40.919 BREAKTHROUGH SEIZURE: Primary | ICD-10-CM

## 2018-09-10 LAB
ALBUMIN SERPL BCP-MCNC: 3.9 G/DL
ALP SERPL-CCNC: 120 U/L
ALT SERPL W/O P-5'-P-CCNC: 25 U/L
AMORPH CRY URNS QL MICRO: ABNORMAL
ANION GAP SERPL CALC-SCNC: 10 MMOL/L
AST SERPL-CCNC: 22 U/L
B-HCG UR QL: NEGATIVE
BACTERIA #/AREA URNS HPF: ABNORMAL /HPF
BASOPHILS # BLD AUTO: 0 K/UL
BASOPHILS NFR BLD: 0.2 %
BILIRUB SERPL-MCNC: 0.3 MG/DL
BILIRUB UR QL STRIP: NEGATIVE
BUN SERPL-MCNC: 15 MG/DL
CALCIUM SERPL-MCNC: 9.1 MG/DL
CHLORIDE SERPL-SCNC: 111 MMOL/L
CLARITY UR: ABNORMAL
CO2 SERPL-SCNC: 21 MMOL/L
COLOR UR: YELLOW
CREAT SERPL-MCNC: 0.7 MG/DL
CTP QC/QA: YES
DIFFERENTIAL METHOD: ABNORMAL
EOSINOPHIL # BLD AUTO: 0 K/UL
EOSINOPHIL NFR BLD: 0.3 %
ERYTHROCYTE [DISTWIDTH] IN BLOOD BY AUTOMATED COUNT: 13.4 %
EST. GFR  (AFRICAN AMERICAN): >60 ML/MIN/1.73 M^2
EST. GFR  (NON AFRICAN AMERICAN): >60 ML/MIN/1.73 M^2
GLUCOSE SERPL-MCNC: 99 MG/DL
GLUCOSE UR QL STRIP: NEGATIVE
HCT VFR BLD AUTO: 33.9 %
HGB BLD-MCNC: 11.1 G/DL
HGB UR QL STRIP: ABNORMAL
KETONES UR QL STRIP: NEGATIVE
LEUKOCYTE ESTERASE UR QL STRIP: NEGATIVE
LYMPHOCYTES # BLD AUTO: 1.1 K/UL
LYMPHOCYTES NFR BLD: 14 %
MAGNESIUM SERPL-MCNC: 2.1 MG/DL
MCH RBC QN AUTO: 27.6 PG
MCHC RBC AUTO-ENTMCNC: 32.8 G/DL
MCV RBC AUTO: 84 FL
MICROSCOPIC COMMENT: ABNORMAL
MONOCYTES # BLD AUTO: 0.4 K/UL
MONOCYTES NFR BLD: 4.7 %
NEUTROPHILS # BLD AUTO: 6.2 K/UL
NEUTROPHILS NFR BLD: 80.8 %
NITRITE UR QL STRIP: NEGATIVE
PH UR STRIP: 7 [PH] (ref 5–8)
PLATELET # BLD AUTO: 364 K/UL
PMV BLD AUTO: 8.3 FL
POTASSIUM SERPL-SCNC: 3.7 MMOL/L
PROT SERPL-MCNC: 7.1 G/DL
PROT UR QL STRIP: ABNORMAL
RBC # BLD AUTO: 4.03 M/UL
RBC #/AREA URNS HPF: 70 /HPF (ref 0–4)
SODIUM SERPL-SCNC: 142 MMOL/L
SP GR UR STRIP: 1.02 (ref 1–1.03)
URN SPEC COLLECT METH UR: ABNORMAL
UROBILINOGEN UR STRIP-ACNC: ABNORMAL EU/DL
WBC # BLD AUTO: 7.7 K/UL

## 2018-09-10 PROCEDURE — 96374 THER/PROPH/DIAG INJ IV PUSH: CPT

## 2018-09-10 PROCEDURE — 36415 COLL VENOUS BLD VENIPUNCTURE: CPT

## 2018-09-10 PROCEDURE — 83735 ASSAY OF MAGNESIUM: CPT

## 2018-09-10 PROCEDURE — 63600175 PHARM REV CODE 636 W HCPCS: Performed by: EMERGENCY MEDICINE

## 2018-09-10 PROCEDURE — 85025 COMPLETE CBC W/AUTO DIFF WBC: CPT

## 2018-09-10 PROCEDURE — 25000003 PHARM REV CODE 250: Performed by: EMERGENCY MEDICINE

## 2018-09-10 PROCEDURE — 81000 URINALYSIS NONAUTO W/SCOPE: CPT

## 2018-09-10 PROCEDURE — 81025 URINE PREGNANCY TEST: CPT | Performed by: NURSE PRACTITIONER

## 2018-09-10 PROCEDURE — 99284 EMERGENCY DEPT VISIT MOD MDM: CPT | Mod: 25

## 2018-09-10 PROCEDURE — 80053 COMPREHEN METABOLIC PANEL: CPT

## 2018-09-10 RX ORDER — ACETAMINOPHEN 500 MG
1000 TABLET ORAL
Status: COMPLETED | OUTPATIENT
Start: 2018-09-10 | End: 2018-09-10

## 2018-09-10 RX ORDER — ONDANSETRON 2 MG/ML
4 INJECTION INTRAMUSCULAR; INTRAVENOUS
Status: COMPLETED | OUTPATIENT
Start: 2018-09-10 | End: 2018-09-10

## 2018-09-10 RX ADMIN — ACETAMINOPHEN 1000 MG: 500 TABLET, FILM COATED ORAL at 09:09

## 2018-09-10 RX ADMIN — ONDANSETRON 4 MG: 2 INJECTION INTRAMUSCULAR; INTRAVENOUS at 10:09

## 2018-09-11 ENCOUNTER — TELEPHONE (OUTPATIENT)
Dept: NEUROSURGERY | Facility: CLINIC | Age: 23
End: 2018-09-11

## 2018-09-11 NOTE — TELEPHONE ENCOUNTER
Patient had a seizure yesterday, it was a lot worse then her normal seizure.  Her mother in law said, she turned gray. It took her longer to come out of the seizure.  She was told she stopped breathing during the seizure. She woke up with a splitting headache.  She still has the headache today and does not feel right. She feels like something is wrong.  She has not missed any medication. Sleeping habits OK.  She has a  baby.  She had uninterrupted sleep the night before.  She had a mental breakdown a few weeks ago and was hospitalized for severe depression.  She also just had an IUD placed.  Please advise.

## 2018-09-11 NOTE — TELEPHONE ENCOUNTER
----- Message from Nichol Hunt sent at 9/11/2018  9:38 AM CDT -----  Contact: Syeda  Type: Needs Medical Advice    Who Called:  patient  Symptoms (please be specific):  [facility] ER 9/10/18; different type of seizure normally has with turning gray, stopped breathing, and headache; Dx lack of sleep  How long has patient had these symptoms:  na  Best Call Back Number: [phone]  Additional Information:  Calling to let know; did try to book an appointment but nothing showing available for any Medicaid slots. Thanks!

## 2018-09-11 NOTE — ED NOTES
Pt aaox4, resp even and unlabored, skin pink warm and dry. Nadn. Denies pain or discomfort. Pt reports seizure activity tonight. Hx seizures, does report being compliant with medication

## 2018-09-11 NOTE — ED PROVIDER NOTES
Encounter Date: 9/10/2018    SCRIBE #1 NOTE: I, Nick Kuhn, am scribing for, and in the presence of, Dr. Rod.       History     Chief Complaint   Patient presents with    Seizures     prior to arrival pt reports one seizure. Longer than normal. Lasted about 3 minutes. Post ictal not breathing effectively. Pt reports family saying she turned gray     09/10/2018 9:39 PM    The pt is a 23 y.o. female arriving via EMS with a past medical history of seizures presenting to the ED with a sudden onset of acute beginning 2 hrs ago. The pt reported 1 seizure episode lasting about 3 minutes. The relative reported she was sitting on a sofa when she fell backwards and her face turned gray. She stated current symptoms are similar to previous seizure episodes. Associated symptom of syncope, nausea, and headache. The pt noted she takes zonisamide regularly. She reported decreased sleep and recently . The pt stated her neurologist is Dr. Ruff. She has no history of cigarette smoking. The pt has no pertinent past surgical history.       The history is provided by the patient.     Review of patient's allergies indicates:   Allergen Reactions    Benadryl [diphenhydramine hcl] Other (See Comments)     Lowers seizure threshold.    Quinolones Other (See Comments)     Lowers seizure threshold.     Tramadol Other (See Comments)     seizures     Past Medical History:   Diagnosis Date    Asthma     Depression affecting pregnancy     IUD (intrauterine device) in place     placed today 09/10/18    Mental disorder     Seizures     Epilepsy     Past Surgical History:   Procedure Laterality Date    ADENOIDECTOMY W/ MYRINGOTOMY AND TUBES      CHOLECYSTECTOMY       Family History   Problem Relation Age of Onset    Heart disease Father     Heart disease Maternal Grandfather     Heart disease Paternal Grandmother     Heart disease Paternal Grandfather     Congenital heart disease Neg Hx     Cardiomyopathy Neg Hx      Arrhythmia Neg Hx     Hypertension Neg Hx     Pacemaker/defibrilator Neg Hx      Social History     Tobacco Use    Smoking status: Never Smoker    Smokeless tobacco: Never Used   Substance Use Topics    Alcohol use: No     Frequency: Never    Drug use: No     Review of Systems   Constitutional: Negative for fever.   HENT: Negative for congestion.    Eyes: Negative for visual disturbance.   Respiratory: Negative for wheezing.    Cardiovascular: Negative for chest pain.   Gastrointestinal: Positive for nausea. Negative for abdominal pain.   Genitourinary: Negative for dysuria.   Musculoskeletal: Negative for joint swelling.   Skin: Negative for rash.   Neurological: Positive for seizures, syncope and headaches.   Hematological: Does not bruise/bleed easily.   Psychiatric/Behavioral: Negative for confusion.       Physical Exam     Initial Vitals [09/10/18 1956]   BP Pulse Resp Temp SpO2   116/79 102 16 98.5 °F (36.9 °C) 100 %      MAP       --         Physical Exam    Nursing note and vitals reviewed.  Constitutional: She appears well-nourished.   HENT:   Head: Normocephalic and atraumatic.   Eyes: Conjunctivae and EOM are normal.   Neck: Normal range of motion. Neck supple. No thyroid mass present.   Cardiovascular: Normal rate, regular rhythm and normal heart sounds. Exam reveals no gallop and no friction rub.    No murmur heard.  Pulmonary/Chest: Breath sounds normal. She has no wheezes. She has no rhonchi. She has no rales.   Abdominal: Soft. Normal appearance and bowel sounds are normal. There is no tenderness.   Musculoskeletal: Normal range of motion.   Neurological: She is alert and oriented to person, place, and time. She has normal strength. No cranial nerve deficit or sensory deficit.   Skin: Skin is warm and dry. No rash noted. No erythema.   Psychiatric: She has a normal mood and affect. Her speech is normal. Cognition and memory are normal.         ED Course   Procedures  Labs Reviewed   URINALYSIS,  REFLEX TO URINE CULTURE - Abnormal; Notable for the following components:       Result Value    Appearance, UA Cloudy (*)     Protein, UA Trace (*)     Occult Blood UA 3+ (*)     Urobilinogen, UA 2.0-3.0 (*)     All other components within normal limits    Narrative:     Preferred Collection Type->Urine, Clean Catch   URINALYSIS MICROSCOPIC - Abnormal; Notable for the following components:    RBC, UA 70 (*)     Bacteria, UA Few (*)     All other components within normal limits    Narrative:     Preferred Collection Type->Urine, Clean Catch   CBC W/ AUTO DIFFERENTIAL - Abnormal; Notable for the following components:    Hemoglobin 11.1 (*)     Hematocrit 33.9 (*)     Platelets 364 (*)     MPV 8.3 (*)     Gran% 80.8 (*)     Lymph% 14.0 (*)     All other components within normal limits   COMPREHENSIVE METABOLIC PANEL - Abnormal; Notable for the following components:    Chloride 111 (*)     CO2 21 (*)     All other components within normal limits   MAGNESIUM   POCT URINE PREGNANCY          Imaging Results    None          Medical Decision Making:   History:   Old Medical Records: I decided to obtain old medical records.  Clinical Tests:   Lab Tests: Ordered and Reviewed  ED Management:  Patient presents with a recurrence of complex partial seizure. I have no suspicion of an intracranial, traumatic, infectious, metabolic, toxic, cardiovascular (specifically arrhythmia), or other emergent medical condition requiring further intervention.  Labs found to be negative for evidence of electrolyte abnormality or progressing infection or inflammation.  The patient reports she is not sleeping much at all secondary to having a new infant at home and her breakthrough seizure is possibly associated with this new reduced sleep pattern.  The patient reports she is taking her zonisamide as prescribed and she is strongly encouraged to continue this.  Seizure precautions were discussed with the patient and her mother. Patient is safe for  discharge with recommendations to follow up with neurologist as soon as possible.  Patient was discharged in stable condition with her mother as a .                Scribe Attestation:   Scribe #1: I performed the above scribed service and the documentation accurately describes the services I performed. I attest to the accuracy of the note.    I, Dr. Paco Rod, personally performed the services described in this documentation. All medical record entries made by the scribe were at my direction and in my presence.  I have reviewed the chart and agree that the record reflects my personal performance and is accurate and complete. Paco Rod MD.  6:15 AM 09/11/2018             Clinical Impression:   The encounter diagnosis was Breakthrough seizure.      Disposition:   Disposition: Discharged  Condition: Stable                        Paco Rod MD  09/11/18 0615

## 2018-09-14 ENCOUNTER — LAB VISIT (OUTPATIENT)
Dept: LAB | Facility: HOSPITAL | Age: 23
End: 2018-09-14
Attending: PSYCHIATRY & NEUROLOGY
Payer: MEDICAID

## 2018-09-14 ENCOUNTER — TELEPHONE (OUTPATIENT)
Dept: NEUROLOGY | Facility: CLINIC | Age: 23
End: 2018-09-14

## 2018-09-14 DIAGNOSIS — G40.209 PARTIAL SYMPTOMATIC EPILEPSY WITH COMPLEX PARTIAL SEIZURES, NOT INTRACTABLE, WITHOUT STATUS EPILEPTICUS: ICD-10-CM

## 2018-09-14 LAB
ALBUMIN SERPL BCP-MCNC: 3.9 G/DL
ALP SERPL-CCNC: 105 U/L
ALT SERPL W/O P-5'-P-CCNC: 19 U/L
ANION GAP SERPL CALC-SCNC: 10 MMOL/L
AST SERPL-CCNC: 17 U/L
BASOPHILS # BLD AUTO: 0 K/UL
BASOPHILS NFR BLD: 0.5 %
BILIRUB SERPL-MCNC: 0.2 MG/DL
BUN SERPL-MCNC: 15 MG/DL
CALCIUM SERPL-MCNC: 9.5 MG/DL
CHLORIDE SERPL-SCNC: 109 MMOL/L
CO2 SERPL-SCNC: 22 MMOL/L
CREAT SERPL-MCNC: 0.7 MG/DL
DIFFERENTIAL METHOD: ABNORMAL
EOSINOPHIL # BLD AUTO: 0.1 K/UL
EOSINOPHIL NFR BLD: 1.4 %
ERYTHROCYTE [DISTWIDTH] IN BLOOD BY AUTOMATED COUNT: 13.1 %
EST. GFR  (AFRICAN AMERICAN): >60 ML/MIN/1.73 M^2
EST. GFR  (NON AFRICAN AMERICAN): >60 ML/MIN/1.73 M^2
GLUCOSE SERPL-MCNC: 84 MG/DL
HCT VFR BLD AUTO: 35.7 %
HGB BLD-MCNC: 11.7 G/DL
LYMPHOCYTES # BLD AUTO: 1.7 K/UL
LYMPHOCYTES NFR BLD: 29.9 %
MCH RBC QN AUTO: 27.6 PG
MCHC RBC AUTO-ENTMCNC: 32.8 G/DL
MCV RBC AUTO: 84 FL
MONOCYTES # BLD AUTO: 0.5 K/UL
MONOCYTES NFR BLD: 8.7 %
NEUTROPHILS # BLD AUTO: 3.4 K/UL
NEUTROPHILS NFR BLD: 59.5 %
PLATELET # BLD AUTO: 423 K/UL
PMV BLD AUTO: 8.1 FL
POTASSIUM SERPL-SCNC: 4.1 MMOL/L
PROT SERPL-MCNC: 7.5 G/DL
RBC # BLD AUTO: 4.24 M/UL
SODIUM SERPL-SCNC: 141 MMOL/L
WBC # BLD AUTO: 5.8 K/UL

## 2018-09-14 PROCEDURE — 36415 COLL VENOUS BLD VENIPUNCTURE: CPT

## 2018-09-14 PROCEDURE — 80203 DRUG SCREEN QUANT ZONISAMIDE: CPT

## 2018-09-14 PROCEDURE — 80053 COMPREHEN METABOLIC PANEL: CPT

## 2018-09-14 PROCEDURE — 85025 COMPLETE CBC W/AUTO DIFF WBC: CPT

## 2018-09-14 NOTE — TELEPHONE ENCOUNTER
----- Message from Cristobal Lugo sent at 9/14/2018  9:30 AM CDT -----  Contact: patient  Type:  Sooner Apoointment Request    Caller is requesting a sooner appointment.  Caller declined first available appointment listed below.  Caller will not accept being placed on the waitlist and is requesting a message be sent to doctor.    Name of Caller:  Syeda  When is the first available appointment?  12/5(current appointment)  Symptoms:  seizure  Best Call Back Number:  [phone]  Additional Information:  Says she had a really bad seizure on 9/10, and haven't felt the same since. Says she turned like a grayish/blue color, as if she was dead, had a headache and needed assistance to walk. Please advise. Thanks.

## 2018-09-14 NOTE — TELEPHONE ENCOUNTER
Patient instructed to get the trough levels done this weekend. Appointment scheduled for 10/16/2018.

## 2018-09-17 ENCOUNTER — TELEPHONE (OUTPATIENT)
Dept: NEUROLOGY | Facility: CLINIC | Age: 23
End: 2018-09-17

## 2018-09-17 NOTE — TELEPHONE ENCOUNTER
----- Message from Nara Morris sent at 9/17/2018 10:32 AM CDT -----  Contact: self  Type:  Patient Returning Call    Who Called:  Patient   Who Left Message for Patient:  Nurse   Does the patient know what this is regarding?:    Best Call Back Number:  [phone]  Additional Information:  Patient states she was call on the [phone]

## 2018-09-17 NOTE — TELEPHONE ENCOUNTER
----- Message from Eulalia Cotton sent at 9/17/2018  8:54 AM CDT -----  Type:  Test Results    Who Called:  Patient  Name of Test (Lab/Mammo/Etc):  Lab  Date of Test:  9/14/18  Ordering Provider:  Same  Where the test was performed:  [facility]  Best Call Back Number:  [phone]

## 2018-09-18 ENCOUNTER — TELEPHONE (OUTPATIENT)
Dept: NEUROLOGY | Facility: CLINIC | Age: 23
End: 2018-09-18

## 2018-09-18 DIAGNOSIS — G40.109 LOCALIZATION-RELATED EPILEPSY: ICD-10-CM

## 2018-09-18 LAB — ZONISAMIDE SERPL-MCNC: 26 MCG/ML (ref 10–40)

## 2018-09-18 RX ORDER — ZONISAMIDE 100 MG/1
300 CAPSULE ORAL 2 TIMES DAILY
Qty: 180 CAPSULE | Refills: 3 | Status: SHIPPED | OUTPATIENT
Start: 2018-09-18 | End: 2018-11-01 | Stop reason: SDUPTHER

## 2018-09-18 NOTE — TELEPHONE ENCOUNTER
Patient is taking 400 mg of Zonegran daily.  She is still foggy and fatigued.  She is also having memory issues since the seizure.  She is also wanting the results of the trough level.

## 2018-09-18 NOTE — TELEPHONE ENCOUNTER
Patient notified to go up on the Zonergan 500 mg tonight and 300 mg BID starting tomorrow.  Instructed to call for any problems.  Patient is wondering if she can get a recovery spray(Ativan nasal).  Please advise.

## 2018-09-18 NOTE — TELEPHONE ENCOUNTER
----- Message from Jeny Varghese sent at 9/18/2018 12:46 PM CDT -----  Contact: Self  Type:  Test Results    Who Called:  Patient  Name of Test (Lab/Mammo/Etc):  labs  Date of Test:  09/14  Ordering Provider:  Elzbieta  Where the test was performed:  [address] [place] [address]   Best Call Back Number:  [phone] (home)     Additional Information:

## 2018-10-16 ENCOUNTER — TELEPHONE (OUTPATIENT)
Dept: NEUROLOGY | Facility: CLINIC | Age: 23
End: 2018-10-16

## 2018-10-16 NOTE — TELEPHONE ENCOUNTER
----- Message from Beth Stern sent at 10/16/2018  2:18 PM CDT -----  Type:  Sooner Apoointment Request    Caller is requesting a sooner appointment.  Caller declined first available appointment listed below.  Caller will not accept being placed on the waitlist and is requesting a message be sent to doctor.    Name of Caller:  Self   When is the first available appointment?  01/2019 Symptoms:    Best Call Back Number:  [phone] Additional Information:  Patient called asking to reschedule appointment.

## 2018-10-18 ENCOUNTER — DOCUMENTATION ONLY (OUTPATIENT)
Dept: FAMILY MEDICINE | Facility: CLINIC | Age: 23
End: 2018-10-18

## 2018-10-18 NOTE — PROGRESS NOTES
Pre-Visit Chart Review  For Appointment Scheduled on 10/19/2018    Health Maintenance Due   Topic Date Due    Pap Smear  04/14/2014    Influenza Vaccine  08/01/2018

## 2018-10-19 ENCOUNTER — OFFICE VISIT (OUTPATIENT)
Dept: FAMILY MEDICINE | Facility: CLINIC | Age: 23
End: 2018-10-19
Payer: MEDICAID

## 2018-10-19 VITALS
BODY MASS INDEX: 27.73 KG/M2 | DIASTOLIC BLOOD PRESSURE: 68 MMHG | HEIGHT: 63 IN | TEMPERATURE: 98 F | SYSTOLIC BLOOD PRESSURE: 97 MMHG | HEART RATE: 92 BPM | WEIGHT: 156.5 LBS

## 2018-10-19 DIAGNOSIS — G40.909 SEIZURE DISORDER: ICD-10-CM

## 2018-10-19 DIAGNOSIS — R10.13 EPIGASTRIC PAIN: Primary | ICD-10-CM

## 2018-10-19 DIAGNOSIS — F41.9 ANXIETY AND DEPRESSION: ICD-10-CM

## 2018-10-19 DIAGNOSIS — F32.A ANXIETY AND DEPRESSION: ICD-10-CM

## 2018-10-19 DIAGNOSIS — L98.9 SKIN LESIONS, GENERALIZED: ICD-10-CM

## 2018-10-19 PROCEDURE — 99214 OFFICE O/P EST MOD 30 MIN: CPT | Mod: S$PBB,,, | Performed by: PHYSICIAN ASSISTANT

## 2018-10-19 PROCEDURE — 99214 OFFICE O/P EST MOD 30 MIN: CPT | Mod: PBBFAC,PO | Performed by: PHYSICIAN ASSISTANT

## 2018-10-19 PROCEDURE — 99999 PR PBB SHADOW E&M-EST. PATIENT-LVL IV: CPT | Mod: PBBFAC,,, | Performed by: PHYSICIAN ASSISTANT

## 2018-10-19 RX ORDER — FLUOXETINE HYDROCHLORIDE 40 MG/1
40 CAPSULE ORAL DAILY
Qty: 30 CAPSULE | Refills: 2 | Status: SHIPPED | OUTPATIENT
Start: 2018-10-19 | End: 2018-11-18

## 2018-10-19 NOTE — PROGRESS NOTES
Subjective:       Patient ID: Syeda Gonzalez is a 23 y.o. female.    Chief Complaint: Abdominal Pain    Patient with seizure disorder and depression presents for evaluation of epigastric abdominal pain.  She states that the pain began yesterday after eating [employer].  She denies nausea vomiting diarrhea or constipation.  Eating makes the pain worse.  Drinking Water makes the pain better.  She has a history of gastritis.  She has not tried any medications to help with her symptoms.  Regarding her depression anxiety patient states that her symptoms are currently uncontrolled.  She describes symptoms of uncontrollable shaking chest pain and difficulty catching her breath.  She states that she is able to raise her hands above her head and take slow deep breaths with some relief of her symptoms.  She states that her anxiety has never been this bad.  She feels that her anxiety has worsened since her recent grand mal seizure and increase in seizure medication.  She is also complaining of difficulty with word finding and forgetfulness.  She states that these symptoms began after her most recent seizure.  She has not communicated these problems with her neurologist yet.  Patients patient medical/surgical, social and family histories have been reviewed         Review of Systems   Constitutional: Negative for activity change, appetite change, fatigue and unexpected weight change.   Eyes: Negative for visual disturbance.   Respiratory: Negative for cough and shortness of breath.    Cardiovascular: Negative for chest pain, palpitations and leg swelling.   Gastrointestinal: Positive for abdominal pain. Negative for abdominal distention, anal bleeding, blood in stool, constipation, diarrhea, nausea, rectal pain and vomiting.   Endocrine: Negative for polydipsia and polyuria.   Genitourinary: Negative for difficulty urinating.   Musculoskeletal: Negative for arthralgias.   Skin: Negative for rash.   Neurological: Negative  for dizziness, light-headedness and headaches.   Psychiatric/Behavioral: Positive for confusion, decreased concentration and dysphoric mood. Negative for agitation, behavioral problems, hallucinations, self-injury, sleep disturbance and suicidal ideas. The patient is nervous/anxious. The patient is not hyperactive.        Objective:      Physical Exam   Constitutional: She appears well-developed and well-nourished. She is cooperative. No distress.   Eyes: Conjunctivae and EOM are normal. Pupils are equal, round, and reactive to light. No scleral icterus.   Cardiovascular: Normal rate, regular rhythm and normal heart sounds.   Pulmonary/Chest: Effort normal and breath sounds normal.   Abdominal: Soft. Normal appearance and bowel sounds are normal. She exhibits no distension. There is no hepatosplenomegaly. There is tenderness in the epigastric area.   Musculoskeletal:        Right lower leg: She exhibits no edema.        Left lower leg: She exhibits no edema.   Neurological: She is alert.   Skin: Skin is warm and dry.   Psychiatric: Judgment and thought content normal. Her speech is delayed. She is slowed. She is not actively hallucinating. Cognition and memory are normal. She exhibits a depressed mood. She expresses no homicidal and no suicidal ideation. She is attentive.   Vitals reviewed.      Assessment:       1. Epigastric pain    2. Anxiety and depression    3. Seizure disorder    4. Skin lesions, generalized        Plan:       Syeda was seen today for abdominal pain.    Diagnoses and all orders for this visit:    Epigastric pain      ranitidine (ZANTAC) 150 MG tablet; Take 1 tablet (150 mg total) by mouth 2 (two) times daily.  Anxiety and depression  -     Ambulatory referral to Psychiatry     FLUoxetine (PROZAC) 40 MG capsule; Take 1 capsule (40 mg total) by mouth once daily.    Seizure disorder  Continue per Dr Ruff  Skin lesions  -  - derm referral   -

## 2018-10-19 NOTE — PATIENT INSTRUCTIONS
For stomach - use zantac twice  a day   For depression and anxiety continue prozac and follow up with dr wilcox [phone]  I am reaching out to Dr Ruff regarding the current symptoms since grand mal/increase of med

## 2018-11-01 ENCOUNTER — OFFICE VISIT (OUTPATIENT)
Dept: NEUROLOGY | Facility: CLINIC | Age: 23
End: 2018-11-01
Payer: MEDICAID

## 2018-11-01 ENCOUNTER — TELEPHONE (OUTPATIENT)
Dept: NEUROLOGY | Facility: CLINIC | Age: 23
End: 2018-11-01

## 2018-11-01 VITALS
WEIGHT: 154.69 LBS | SYSTOLIC BLOOD PRESSURE: 123 MMHG | DIASTOLIC BLOOD PRESSURE: 79 MMHG | RESPIRATION RATE: 18 BRPM | HEART RATE: 94 BPM | BODY MASS INDEX: 27.4 KG/M2

## 2018-11-01 DIAGNOSIS — G40.109 LOCALIZATION-RELATED EPILEPSY: ICD-10-CM

## 2018-11-01 PROCEDURE — 99213 OFFICE O/P EST LOW 20 MIN: CPT | Mod: PBBFAC,PN | Performed by: PSYCHIATRY & NEUROLOGY

## 2018-11-01 PROCEDURE — 99999 PR PBB SHADOW E&M-EST. PATIENT-LVL III: CPT | Mod: PBBFAC,,, | Performed by: PSYCHIATRY & NEUROLOGY

## 2018-11-01 PROCEDURE — 99214 OFFICE O/P EST MOD 30 MIN: CPT | Mod: S$PBB,,, | Performed by: PSYCHIATRY & NEUROLOGY

## 2018-11-01 RX ORDER — ZONISAMIDE 100 MG/1
400 CAPSULE ORAL DAILY
Qty: 360 CAPSULE | Refills: 1 | Status: SHIPPED | OUTPATIENT
Start: 2018-11-01 | End: 2018-12-27 | Stop reason: SDUPTHER

## 2018-11-01 RX ORDER — LACOSAMIDE 50 MG/1
TABLET ORAL
Qty: 120 TABLET | Refills: 1 | Status: SHIPPED | OUTPATIENT
Start: 2018-11-01 | End: 2018-12-27 | Stop reason: SDUPTHER

## 2018-11-01 NOTE — PROGRESS NOTES
"Date of service:  2018    Chief complaint:  Seizures    Interval history:  The patient is a 23 y.o. female seen previously for seizures.  I last saw her in August.  She had a seizure in September, and her Zonegran was increased to 300 mg BID.  She notes that she feels "floaty" and "tongue tied" on this dose of the medication.  She also feels like she has panic attacks on the higher dose of the medication.  She states that her last seizure was a few weeks ago.      History of present illness:  The patient is a 23 y.o. female referred for evaluation of episodes suspicious for seizures.  She has seen Dr. St.  This is my first time seeing her.     She gives no clear history of aura.  She does report having felt abnormally cold throughout the day leading up to her seizures, though.  Her seizure is characterized by a loss of consciousness and reported convulsive activity.   She did have tongue biting on the sides.  This component of this spell lasted for what sounds to be a matter of minutes and occurred back to back.  Afterwards, she had fatigue/confusion.  The patient had, she believes, 3-4 events on the day of her hospitalization in May.  She did have a seizure about 4 years ago while on Ultram.  Also, she had some episode with loss of consciousness and convulsions during a blood draw about 3 years ago.    The patient has a possible family history of seizures ("I heard that my great-great-grandmother had epilepsy."  Also seizures in 2 distant cousins who happen to be on different sides of the family.).  She reports no history of prenatal/ complications. There is no history of febrile seizures.  She notes no history of CNS infections. She claims no history of significant head trauma. There is no history of developmental delay.    Current AEDs:  Zonegran 300 mg BID    Prior AEDs:  Lamictal    Prior HPI from Dr. St ():  "Patient is a RH 21 y.o. female presents with seizure activity. Pt with " "hx of past seizure precipitated from use of tramadol. On day of admission brought in with seizure activity, had second seizure event in ED, + tongue biting, no loss of bladder. No hx of head trauma, meningitis or febrile seizures. No illicit drug use or excessive alcohol. 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   Spoke with pt's matthew; she had two previous seizures, one from tramadol, one while giving blood. Recently has had some stressors, poor sleep. No new medications"      Past Medical History:   Diagnosis Date    Asthma     Depression affecting pregnancy     IUD (intrauterine device) in place     placed today 09/10/18    Mental disorder     Seizures     Epilepsy       Past Surgical History:   Procedure Laterality Date    ADENOIDECTOMY W/ MYRINGOTOMY AND TUBES      CHOLECYSTECTOMY         Family History   Problem Relation Age of Onset    Heart disease Father     Heart disease Maternal Grandfather     Heart disease Paternal Grandmother     Heart disease Paternal Grandfather     Congenital heart disease Neg Hx     Cardiomyopathy Neg Hx     Arrhythmia Neg Hx     Hypertension Neg Hx     Pacemaker/defibrilator Neg Hx          Social History     Socioeconomic History    Marital status: Single     Spouse name: Not on file    Number of children: Not on file    Years of education: Not on file    Highest education level: Not on file   Social Needs    Financial resource strain: Not on file    Food insecurity - worry: Not on file    Food insecurity - inability: Not on file    Transportation needs - medical: Not on file    Transportation needs - non-medical: Not on file   Occupational History    Not on file   Tobacco Use    Smoking status: Never Smoker    Smokeless tobacco: Never Used   Substance and Sexual Activity    Alcohol use: No     Frequency: Never    Drug use: No    Sexual activity: Not Currently     Partners: Male     Birth control/protection: None     Comment:   "   Other Topics Concern    Patient feels they ought to cut down on drinking/drug use Not Asked    Patient annoyed by others criticizing their drinking/drug use Not Asked    Patient has felt bad or guilty about drinking/drug use Not Asked    Patient has had a drink/used drugs as an eye opener in the AM Not Asked   Social History Narrative    Fob - lad coronary artery bridge, sister enlarged heart      Review of Systems   General/Constitutional:  No unintentional weight loss, + change in appetite  Eyes/Vision:  + change in vision, + double vision  ENT:  No frequent nose bleeds, No ringing in the ears  Respiratory:  No cough, No wheezing  Cardiovascular:  + chest pain, + palpitations  Gastrointestinal:  No jaundice, + nausea/vomiting  Genitourinary:  No incontinence, No burning with urination  Hematologic/Lymphatic:  No easy bruising/bleeding, No night sweats  Neurological:  No numbness, + weakness  Endocrine:  + fatigue, + heat/cold intolerance  Allergy/Immunologic:  No fevers, + chills  Musculoskeletal:  + muscle pain, No joint pain   Psychiatric:  No thoughts of harming self/others, + depression  Integumentary:  No rashes, No sores that do not heal       Physical exam:  /79   Pulse 94   Resp 18   Wt 70.2 kg (154 lb 11.2 oz)   BMI 27.40 kg/m²   General: Well developed, obese.  No acute distress.  HEENT: Atraumatic, normocephalic.  Neck: Supple, trachea midline.  Cardiovascular: Regular rate and rhythm.  Pulmonary: No increased work of breathing.  Abdomen/GI: No guarding.  Musculoskeletal: No obvious joint deformities, moves all extremities well.    Neurological exam:  Mental status: Awake and alert.  Oriented x4.  Speech fluent and appropriate.  Recent and remote memory appear to be intact.  Fund of knowledge normal.  Cranial nerves: Pupils equal round and reactive to light, extraocular movements intact, facial strength and sensation intact bilaterally, palate and tongue midline, hearing grossly intact  "bilaterally.  Motor: 5 out of 5 strength throughout the upper and lower extremities bilaterally. Normal bulk and tone.  Sensation: Intact to light touch and temperature bilaterally.  DTR: 2+ at the knees and biceps bilaterally.  Coordination: Finger-nose-finger testing intact bilaterally.  Gait: Normal gait. Good tandem.    Data base:  Notes of the referring physician were reviewed.  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    Labs (9/18):  CMP: unremarkable  CBC: includes HCT=36  ZNS: 26    MRI brain (5/16):  "Normal MRI of the brain with and without gadolinium"    EEG (5/16):  "IMPRESSION: This is an abnormal EEG during wakefulness, drowsiness and sleep. Independent left and right temporal focal slowing was noted. In addition, independent left and right temporal focal sharp waves were noted.  CLINICAL CORRELATION: The patient is a 21-year-old female with a history of seizures who is currently maintained on Keppra. This is an abnormal EEG during wakefulness, drowsiness and sleep. The presence of independent left and right temporal focal slowing is suggestive of focal neuronal dysfunction in these regions. The presence of independent left and right temporal sharp waves confers an increased risk of focal seizures from both temporal lobes. During this study, no seizures were recorded."    DEXA (9/17):  Normal    Assessment and plan:  The patient is a 23 y.o. female who presents for follow up on seizures and complaints of suspected medication side effects. I suspect that her events represent partial onset seizures with secondary generalization.  Based on the EEG, a temporal lobe focus is likely; however, it is not clear whether she has bilateral seizure foci or if all of her seizures originate from a single temporal lobe.  The etiology for her epilepsy is unclear.  I do not feel further workup is necessary at this time.  Should she fail multiple AEDs, I would " advocate for an aggressive surgical evaluation, including inpatient vEEG monitoring, PET scan, neuropsychological evaluation, and so forth.  From a management perspective, we will decrease her Zonegran back to 400 mg daily as she did not tolerate the increase.  We will add Vimpat 100 mg BID.  Medication side effects were discussed with the patient.  State law as it pertains to driving for individuals with seizures has been discussed.  The patient has also been counseled on seizure safety.  Teratogenicity of AEDs were discussed.  She was instructed to make sure she was utilizing a reliable means of birth control.  Should she decide to become pregnant, she is to contact us first, so we can determine what medication adjustments, if any, are appropriate.  The patient was counseled that the risks posed by uncontrolled seizures typically exceeds that posed by the medications themselves. Consequently, the patient was advised of the importance of continuing her anticonvulsant medication should she become pregnant.  She was advised to take supplemental folate at 1 mg daily.  Should she desire to become pregnant or actually become pregnant, we will plan on increasing it to 4 mg daily.  We also discussed lactation issues with AEDs.  I also discussed with the patient the need for a PCP.  We will plan on seeing the patient back in a few weeks.

## 2018-11-01 NOTE — TELEPHONE ENCOUNTER
----- Message from Sophie Davidson sent at 11/1/2018  3:37 PM CDT -----  Contact: self   Patient called, pharmacy needs a prior authorization on the new medication that was prescribed. [facility], [phone].  If any questions please call back at [phone]

## 2018-11-07 ENCOUNTER — TELEPHONE (OUTPATIENT)
Dept: NEUROLOGY | Facility: CLINIC | Age: 23
End: 2018-11-07

## 2018-11-12 ENCOUNTER — TELEPHONE (OUTPATIENT)
Dept: NEUROLOGY | Facility: CLINIC | Age: 23
End: 2018-11-12

## 2018-11-12 NOTE — TELEPHONE ENCOUNTER
----- Message from Sarita Urbina sent at 11/12/2018 10:17 AM CST -----  Contact: Self/ [phone]  Pt requesting call from staff asap regarding RX for VIMPAT. Says she spoke to pharmacy today and was told insurance still not covering. Please call with status. Thank you.

## 2018-12-04 ENCOUNTER — TELEPHONE (OUTPATIENT)
Dept: NEUROLOGY | Facility: CLINIC | Age: 23
End: 2018-12-04

## 2018-12-04 NOTE — TELEPHONE ENCOUNTER
----- Message from Jonny Acuna sent at 12/4/2018  1:00 PM CST -----  Contact: same  Patient called in and stated her pharmacy will not refill her Rx for zonisamide (ZONEGRAN) 100 MG Cap?  Patient not sure why.  Patient had to cancel her appt for tomorrow because her son has something to do.  Patient would like a call back at [phone]

## 2018-12-04 NOTE — TELEPHONE ENCOUNTER
Appointment rescheduled.  Rohan called to the pharmacy.  Patient c/o lips, face and hands becoming numb and tingling.  It is also hard for her to grasp things because of the numbness.  Please advise.

## 2018-12-27 DIAGNOSIS — G40.109 LOCALIZATION-RELATED EPILEPSY: ICD-10-CM

## 2018-12-27 RX ORDER — ZONISAMIDE 100 MG/1
400 CAPSULE ORAL DAILY
Qty: 360 CAPSULE | Refills: 1 | Status: SHIPPED | OUTPATIENT
Start: 2018-12-27 | End: 2019-06-27 | Stop reason: SDUPTHER

## 2018-12-27 RX ORDER — LACOSAMIDE 50 MG/1
TABLET ORAL
Qty: 120 TABLET | Refills: 1 | Status: SHIPPED | OUTPATIENT
Start: 2018-12-27 | End: 2019-01-28 | Stop reason: SDUPTHER

## 2018-12-27 NOTE — TELEPHONE ENCOUNTER
----- Message from Servando Chaney sent at 12/27/2018  1:23 PM CST -----  Contact: [phone]  Patient requesting a refill on seizure medication.    Patient will be using   [id number] - [place] - [address] [address]  Phone: [phone] Fax: [phone]    Please call patient at [phone].     Thanks!

## 2019-01-28 DIAGNOSIS — G40.109 LOCALIZATION-RELATED EPILEPSY: ICD-10-CM

## 2019-01-28 RX ORDER — LACOSAMIDE 50 MG/1
100 TABLET ORAL EVERY 12 HOURS
Qty: 120 TABLET | Refills: 5 | Status: SHIPPED | OUTPATIENT
Start: 2019-01-28 | End: 2020-01-28

## 2019-02-21 ENCOUNTER — OFFICE VISIT (OUTPATIENT)
Dept: NEUROLOGY | Facility: CLINIC | Age: 24
End: 2019-02-21
Payer: MEDICAID

## 2019-02-21 VITALS
RESPIRATION RATE: 16 BRPM | DIASTOLIC BLOOD PRESSURE: 71 MMHG | HEART RATE: 86 BPM | BODY MASS INDEX: 27.11 KG/M2 | SYSTOLIC BLOOD PRESSURE: 112 MMHG | WEIGHT: 153 LBS | HEIGHT: 63 IN

## 2019-02-21 DIAGNOSIS — G40.909 SEIZURE DISORDER: Primary | ICD-10-CM

## 2019-02-21 DIAGNOSIS — F41.0 PANIC ATTACKS: ICD-10-CM

## 2019-02-21 PROCEDURE — 99213 OFFICE O/P EST LOW 20 MIN: CPT | Mod: PBBFAC,PN | Performed by: PSYCHIATRY & NEUROLOGY

## 2019-02-21 PROCEDURE — 99214 OFFICE O/P EST MOD 30 MIN: CPT | Mod: S$PBB,,, | Performed by: PSYCHIATRY & NEUROLOGY

## 2019-02-21 PROCEDURE — 99999 PR PBB SHADOW E&M-EST. PATIENT-LVL III: CPT | Mod: PBBFAC,,, | Performed by: PSYCHIATRY & NEUROLOGY

## 2019-02-21 PROCEDURE — 99214 PR OFFICE/OUTPT VISIT, EST, LEVL IV, 30-39 MIN: ICD-10-PCS | Mod: S$PBB,,, | Performed by: PSYCHIATRY & NEUROLOGY

## 2019-02-21 PROCEDURE — 99999 PR PBB SHADOW E&M-EST. PATIENT-LVL III: ICD-10-PCS | Mod: PBBFAC,,, | Performed by: PSYCHIATRY & NEUROLOGY

## 2019-02-21 RX ORDER — FLUOXETINE HYDROCHLORIDE 20 MG/1
20 CAPSULE ORAL DAILY
Refills: 0 | COMMUNITY
Start: 2019-01-03 | End: 2022-11-10

## 2019-02-21 RX ORDER — LACOSAMIDE 100 MG/1
1 TABLET, FILM COATED ORAL 2 TIMES DAILY
Refills: 5 | COMMUNITY
Start: 2019-01-29 | End: 2019-07-26 | Stop reason: SDUPTHER

## 2019-02-21 NOTE — PROGRESS NOTES
"Date of service:  2019    Chief complaint:  Seizures    Interval history:  The patient is a 23 y.o. female seen previously for seizures.  I last saw her in 2018.  She states that her last seizure was on the  of this month.  This was somewhat different from her typical event as she "felt woozy" and remained conscious.  She has been taking Zonegran 200 mg BID and Vimpat 100 mg BID.  She reports good medication compliance with her AEDs.  She does report increased stress recently (cousin  to a drug overdose, 1 year anniversary of her significant other's passing) and erratic use of her antidepressants.  She has also had poor appetite.  She denies SI/HI.    History of present illness:  The patient is a 23 y.o. female referred for evaluation of episodes suspicious for seizures.  She has seen Dr. St.  This is my first time seeing her.     She gives no clear history of aura.  She does report having felt abnormally cold throughout the day leading up to her seizures, though.  Her seizure is characterized by a loss of consciousness and reported convulsive activity.   She did have tongue biting on the sides.  This component of this spell lasted for what sounds to be a matter of minutes and occurred back to back.  Afterwards, she had fatigue/confusion.  The patient had, she believes, 3-4 events on the day of her hospitalization in May.  She did have a seizure about 4 years ago while on Ultram.  Also, she had some episode with loss of consciousness and convulsions during a blood draw about 3 years ago.    The patient has a possible family history of seizures ("I heard that my great-great-grandmother had epilepsy."  Also seizures in 2 distant cousins who happen to be on different sides of the family.).  She reports no history of prenatal/ complications. There is no history of febrile seizures.  She notes no history of CNS infections. She claims no history of significant head trauma. There is " "no history of developmental delay.    Current AEDs:  Zonegran 200 mg BID  Vimpat 100 mg BID    Prior AEDs:  Lamictal    Prior HPI from Dr. St (5/16):  "Patient is a RH 21 y.o. female presents with seizure activity. Pt with hx of past seizure precipitated from use of tramadol. On day of admission brought in with seizure activity, had second seizure event in ED, + tongue biting, no loss of bladder. No hx of head trauma, meningitis or febrile seizures. No illicit drug use or excessive alcohol. 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   Spoke with pt's matthew; she had two previous seizures, one from tramadol, one while giving blood. Recently has had some stressors, poor sleep. No new medications"      Past Medical History:   Diagnosis Date    Asthma     Depression affecting pregnancy     IUD (intrauterine device) in place     placed today 09/10/18    Mental disorder     Seizures     Epilepsy       Past Surgical History:   Procedure Laterality Date    ADENOIDECTOMY W/ MYRINGOTOMY AND TUBES      CHOLECYSTECTOMY         Family History   Problem Relation Age of Onset    Heart disease Father     Heart disease Maternal Grandfather     Heart disease Paternal Grandmother     Heart disease Paternal Grandfather     Congenital heart disease Neg Hx     Cardiomyopathy Neg Hx     Arrhythmia Neg Hx     Hypertension Neg Hx     Pacemaker/defibrilator Neg Hx          Social History     Socioeconomic History    Marital status: Single     Spouse name: Not on file    Number of children: Not on file    Years of education: Not on file    Highest education level: Not on file   Social Needs    Financial resource strain: Not on file    Food insecurity - worry: Not on file    Food insecurity - inability: Not on file    Transportation needs - medical: Not on file    Transportation needs - non-medical: Not on file   Occupational History    Not on file   Tobacco Use    Smoking status: Never " "Smoker    Smokeless tobacco: Never Used   Substance and Sexual Activity    Alcohol use: No     Frequency: Never    Drug use: No    Sexual activity: Not Currently     Partners: Male     Birth control/protection: None     Comment:     Other Topics Concern    Patient feels they ought to cut down on drinking/drug use Not Asked    Patient annoyed by others criticizing their drinking/drug use Not Asked    Patient has felt bad or guilty about drinking/drug use Not Asked    Patient has had a drink/used drugs as an eye opener in the AM Not Asked   Social History Narrative    Fob - lad coronary artery bridge, sister enlarged heart      Review of Systems   General/Constitutional:  No unintentional weight loss, + change in appetite  Eyes/Vision:  + change in vision, + double vision  ENT:  No frequent nose bleeds, No ringing in the ears  Respiratory:  No cough, No wheezing  Cardiovascular:  + chest pain, + palpitations  Gastrointestinal:  No jaundice, + nausea/vomiting  Genitourinary:  No incontinence, No burning with urination  Hematologic/Lymphatic:  No easy bruising/bleeding, No night sweats  Neurological:  No numbness, + weakness  Endocrine:  + fatigue, + heat/cold intolerance  Allergy/Immunologic:  No fevers, + chills  Musculoskeletal:  + muscle pain, No joint pain   Psychiatric:  No thoughts of harming self/others, + depression  Integumentary:  No rashes, No sores that do not heal       Physical exam:  /71 (BP Location: Right arm, Patient Position: Sitting, BP Method: Medium (Automatic))   Pulse 86   Resp 16   Ht 5' 3" (1.6 m)   Wt 69.4 kg (153 lb)   BMI 27.10 kg/m²   General: Well developed, obese.  No acute distress.  HEENT: Atraumatic, normocephalic.  Neck: Supple, trachea midline.  Cardiovascular: Regular rate and rhythm.  Pulmonary: No increased work of breathing.  Abdomen/GI: No guarding.  Musculoskeletal: No obvious joint deformities, moves all extremities well.    Neurological exam:  Mental " "status: Awake and alert.  Oriented x4.  Speech fluent and appropriate.  Recent and remote memory appear to be intact.  Fund of knowledge normal.  Cranial nerves: Pupils equal round and reactive to light, extraocular movements intact, facial strength and sensation intact bilaterally, palate and tongue midline, hearing grossly intact bilaterally.  Motor: 5 out of 5 strength throughout the upper and lower extremities bilaterally. Normal bulk and tone.  Sensation: Intact to light touch and temperature bilaterally.  DTR: 2+ at the knees and biceps bilaterally.  Coordination: Finger-nose-finger testing intact bilaterally.  Gait: Normal gait. Good tandem.    Data base:  Notes of the referring physician were reviewed.  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    Labs (9/18):  CMP: unremarkable  CBC: includes HCT=36  ZNS: 26    MRI brain (5/16):  "Normal MRI of the brain with and without gadolinium"    EEG (5/16):  "IMPRESSION: This is an abnormal EEG during wakefulness, drowsiness and sleep. Independent left and right temporal focal slowing was noted. In addition, independent left and right temporal focal sharp waves were noted.  CLINICAL CORRELATION: The patient is a 21-year-old female with a history of seizures who is currently maintained on Keppra. This is an abnormal EEG during wakefulness, drowsiness and sleep. The presence of independent left and right temporal focal slowing is suggestive of focal neuronal dysfunction in these regions. The presence of independent left and right temporal sharp waves confers an increased risk of focal seizures from both temporal lobes. During this study, no seizures were recorded."    DEXA (9/17):  Normal    Assessment and plan:  The patient is a 23 y.o. female who presents for follow up on seizures. I suspect that her events represent partial onset seizures with secondary generalization.  Based on the EEG, a temporal lobe focus " is likely; however, it is not clear whether she has bilateral seizure foci or if all of her seizures originate from a single temporal lobe.  The etiology for her epilepsy is unclear.  In listening to the event recounted today, this was not typical of her seizures, and I suspect it was either near syncope secondary to poor PO intake, hypoglycemia related to the same, or stress related.  From an epilepsy management perspective, we will continue her Zonegran 200 mg BID and her Vimpat 100 mg BID.  I do not feel further workup is necessary at this time.  Should she have further seizures I would likely increase her Vimpat.  If she should then have seizures on that regimen, I would advocate for an aggressive surgical evaluation, including inpatient vEEG monitoring, PET scan, neuropsychological evaluation, and so forth.  Medication side effects were discussed with the patient.  State law as it pertains to driving for individuals with seizures has been discussed.  The patient has also been counseled on seizure safety.  Teratogenicity of AEDs were discussed.  She was instructed to make sure she was utilizing a reliable means of birth control.  Should she decide to become pregnant, she is to contact us first, so we can determine what medication adjustments, if any, are appropriate.  The patient was counseled that the risks posed by uncontrolled seizures typically exceeds that posed by the medications themselves. Consequently, the patient was advised of the importance of continuing her anticonvulsant medication should she become pregnant.  She was advised to take supplemental folate at 1 mg daily.  Should she desire to become pregnant or actually become pregnant, we will plan on increasing it to 4 mg daily.  We also discussed lactation issues with AEDs.  I also discussed with the patient the need for a PCP.      The patient is having significant issues with depressed mood and anxiety.  I have advised her to touch base with her  PCP.  If this does not produce an improvement in these issues, I will refer her to psychiatry.    We will plan on seeing the patient back in a few weeks.

## 2019-06-13 ENCOUNTER — TELEPHONE (OUTPATIENT)
Dept: NEUROLOGY | Facility: CLINIC | Age: 24
End: 2019-06-13

## 2019-06-13 NOTE — TELEPHONE ENCOUNTER
----- Message from Eulalia Cotton sent at 6/13/2019 11:12 AM CDT -----  Type: Needs Medical Advice    Who Called:  Patient  Best Call Back Number:   Additional Information: Just letting office know that her dentist is faxing office a clearance so that patient can get a dental procedure done. Please fill out and fax it back to dentist. Please call patient to let her know this has been completed. This procedure is scheduled for tomorrow.

## 2019-06-14 ENCOUNTER — TELEPHONE (OUTPATIENT)
Dept: NEUROLOGY | Facility: CLINIC | Age: 24
End: 2019-06-14

## 2019-06-14 NOTE — TELEPHONE ENCOUNTER
Notified that we received the fax and will try to get done this evening.  She verbalized understanding.

## 2019-06-14 NOTE — TELEPHONE ENCOUNTER
----- Message from Tayler Corrales sent at 6/14/2019 10:05 AM CDT -----    Type:  Patient Returning Call    Who Called: pt  Who Left Message for Patient:    nurse  Does the patient know what this is regarding?:    Returning  call  Best Call Back Number:  [phone]  Additional Information:    Please call  Back  For details

## 2019-06-14 NOTE — TELEPHONE ENCOUNTER
----- Message from Myra Vidal sent at 6/14/2019  9:02 AM CDT -----  Contact: pt  Type: Needs Medical Advice    Who Called:  Pt  Best Call Back Number: [phone]   Additional Information: Patient called in to see is clearance papers were received from her Dentist.

## 2019-06-14 NOTE — TELEPHONE ENCOUNTER
Unable to leave a message on [phone].  Left message with female at [phone] to have the pt call the office.

## 2019-06-17 ENCOUNTER — TELEPHONE (OUTPATIENT)
Dept: NEUROLOGY | Facility: CLINIC | Age: 24
End: 2019-06-17

## 2019-06-17 NOTE — TELEPHONE ENCOUNTER
----- Message from Keith Hudson sent at 6/17/2019 10:04 AM CDT -----  Contact: Patient  [phone]  Type: Needs Medical Advice    Who Called: Patient  [phone]    Best Call Back Number:Patient  [phone]    Additional Information:     Hello, WILL have MRN [id number] Syeda Gonzalez following up on a request for medical clearance to have a tooth extraction today.  Has been waiting for clearance since 06-13-19 for response to clearance.  Please call to advise Patient  [phone].

## 2019-06-17 NOTE — TELEPHONE ENCOUNTER
----- Message from Dario oR sent at 6/17/2019 11:36 AM CDT -----  Contact: self   Patient want to remind office to fax medical clearance to [facility] in [place], any questions please call back at [phone] (home) patient said she is in pain have have a swollen face

## 2019-06-17 NOTE — TELEPHONE ENCOUNTER
----- Message from Eulalia Ctoton sent at 6/17/2019  9:09 AM CDT -----  Type: Needs Medical Advice    Who Called:  Patient  Best Call Back Number: [phone]  Additional Information: Office had said that they was going to get her surgical clearance sent on 6/13/19. The dentist office states that they still have not received it and patient is complaining that she is in a lot of pain and needs to get this done. Please call to advise. Please fax to [facility] on [address] In [place]. She did not have the fax number.

## 2019-06-18 ENCOUNTER — TELEPHONE (OUTPATIENT)
Dept: NEUROLOGY | Facility: CLINIC | Age: 24
End: 2019-06-18

## 2019-06-27 DIAGNOSIS — G40.109 LOCALIZATION-RELATED EPILEPSY: ICD-10-CM

## 2019-06-27 RX ORDER — ZONISAMIDE 100 MG/1
400 CAPSULE ORAL DAILY
Qty: 360 CAPSULE | Refills: 0 | Status: SHIPPED | OUTPATIENT
Start: 2019-06-27 | End: 2019-09-24 | Stop reason: SDUPTHER

## 2019-07-26 RX ORDER — LACOSAMIDE 100 MG/1
TABLET, FILM COATED ORAL
Qty: 60 TABLET | Refills: 3 | Status: SHIPPED | OUTPATIENT
Start: 2019-07-26 | End: 2020-01-30

## 2019-09-12 ENCOUNTER — TELEPHONE (OUTPATIENT)
Dept: NEUROLOGY | Facility: CLINIC | Age: 24
End: 2019-09-12

## 2019-09-12 NOTE — TELEPHONE ENCOUNTER
Pt needs an order for a Bone dexa test.  Please put in Epic.  Pt given the schedulers number to call and schedule the test.

## 2019-09-13 DIAGNOSIS — Z79.899 HIGH RISK MEDICATION USE: ICD-10-CM

## 2019-09-13 DIAGNOSIS — M89.9 BONE DISORDER: Primary | ICD-10-CM

## 2019-09-24 DIAGNOSIS — G40.109 LOCALIZATION-RELATED EPILEPSY: ICD-10-CM

## 2019-09-24 RX ORDER — ZONISAMIDE 100 MG/1
CAPSULE ORAL
Qty: 360 CAPSULE | Refills: 0 | Status: SHIPPED | OUTPATIENT
Start: 2019-09-24 | End: 2019-12-11 | Stop reason: SDUPTHER

## 2019-12-11 DIAGNOSIS — G40.109 LOCALIZATION-RELATED EPILEPSY: ICD-10-CM

## 2019-12-11 RX ORDER — ZONISAMIDE 100 MG/1
CAPSULE ORAL
Qty: 360 CAPSULE | Refills: 0 | OUTPATIENT
Start: 2019-12-11

## 2019-12-11 RX ORDER — ZONISAMIDE 100 MG/1
CAPSULE ORAL
Qty: 360 CAPSULE | Refills: 0 | Status: SHIPPED | OUTPATIENT
Start: 2019-12-11 | End: 2020-03-19

## 2020-01-03 ENCOUNTER — TELEPHONE (OUTPATIENT)
Dept: NEUROLOGY | Facility: CLINIC | Age: 25
End: 2020-01-03

## 2020-01-03 NOTE — TELEPHONE ENCOUNTER
----- Message from Kiah Lane sent at 1/3/2020  9:43 AM CST -----  Contact: pt  Name of Who is Calling: pt    What is the request in detail: states per Dr. Ruff she was instructed to call in to be scheduled if she discovered she was pregnant. Pt states she is needing to speak with staff. Please contact to further discuss and advise      Can the clinic reply by [facility]: no    What Number to Call Back if not in [facility]: [phone]

## 2020-01-30 RX ORDER — LACOSAMIDE 100 MG/1
TABLET, FILM COATED ORAL
Qty: 60 TABLET | Refills: 0 | Status: SHIPPED | OUTPATIENT
Start: 2020-01-30 | End: 2020-03-27 | Stop reason: SDUPTHER

## 2020-01-30 NOTE — TELEPHONE ENCOUNTER
This patient has not been seen in nearly a year.  Also, we were told she was pregnant again, but she no showed for her appointment with me next week.  I don't want her to run out of medication, so I have sent a 1 month refill.  Find out what is going on with her, though.  If she is pregnant, she has got to come to clinic so we can appropriately monitor her AEDs.

## 2020-03-19 DIAGNOSIS — G40.109 LOCALIZATION-RELATED EPILEPSY: ICD-10-CM

## 2020-03-19 RX ORDER — ZONISAMIDE 100 MG/1
CAPSULE ORAL
Qty: 360 CAPSULE | Refills: 0 | Status: SHIPPED | OUTPATIENT
Start: 2020-03-19 | End: 2020-06-25

## 2020-03-27 RX ORDER — LACOSAMIDE 100 MG/1
1 TABLET ORAL 2 TIMES DAILY
Qty: 60 TABLET | Refills: 0 | Status: SHIPPED | OUTPATIENT
Start: 2020-03-27 | End: 2020-05-18 | Stop reason: SDUPTHER

## 2020-03-27 NOTE — TELEPHONE ENCOUNTER
The patient has not been seen in >1 year.  This patient has repeated failed to show up for appointments (her last 3 appointments have been no shows).  I have refilled this medication this one time.  If she does not schedule an appointment, I cannot continue to refill it.  If she makes an appointment and does not show up for it, she probably needs to find another neurologist.

## 2020-04-29 RX ORDER — LACOSAMIDE 100 MG/1
TABLET, FILM COATED ORAL
Qty: 60 TABLET | Refills: 0 | OUTPATIENT
Start: 2020-04-29

## 2020-04-29 NOTE — TELEPHONE ENCOUNTER
The patient has not been seen in >1 year.  She has no showed for her last 3 visits.  She does not have follow up scheduled.  The refill has been declined.  She will have to make a follow up visit to get a refill, and, if she does not keep it, further refills will not be supplied.

## 2020-05-18 ENCOUNTER — TELEPHONE (OUTPATIENT)
Dept: NEUROLOGY | Facility: CLINIC | Age: 25
End: 2020-05-18

## 2020-05-18 RX ORDER — LACOSAMIDE 100 MG/1
1 TABLET ORAL 2 TIMES DAILY
Qty: 60 TABLET | Refills: 0 | Status: SHIPPED | OUTPATIENT
Start: 2020-05-18 | End: 2020-06-22 | Stop reason: SDUPTHER

## 2020-05-18 NOTE — TELEPHONE ENCOUNTER
----- Message from Elizabeth Storey sent at 5/16/2020  9:36 AM CDT -----  Type: Needs Medical Advice  Who Called:  patient  Symptoms (please be specific):    How long has patient had these symptoms:    Pharmacy name and phone #:    Best Call Back Number:   Additional Information: requesting a call back regarding appt for Seizures

## 2020-05-18 NOTE — TELEPHONE ENCOUNTER
----- Message from Leodan Smith sent at 5/18/2020  9:33 AM CDT -----  Contact: Tawanna ([facility])  Type:  Pharmacy Calling to Clarify an RX    Name of Caller:  Tawanna  Pharmacy Name:    [facility] - [place], [place] - [address] [address]  [place] [address]  Phone: [phone] Fax: [phone]  Prescription Name:  lacosamide (VIMPAT) 50 mg Tab  What do they need to clarify?:  emergency refill; controlled substance  Best Call Back Number:  [phone]  Additional Information:  NA

## 2020-05-26 ENCOUNTER — TELEPHONE (OUTPATIENT)
Dept: NEUROLOGY | Facility: CLINIC | Age: 25
End: 2020-05-26

## 2020-05-26 NOTE — TELEPHONE ENCOUNTER
----- Message from Princess MIGUEL Vee sent at 5/26/2020  9:26 AM CDT -----  Contact: pt  Type: Needs Medical Advice  Who Called: pt  Best Call Back Number:[phone] (home)     Additional Information: Requesting a call in regards to scheduling a new appt. Please call for any availablilty

## 2020-06-05 ENCOUNTER — OFFICE VISIT (OUTPATIENT)
Dept: NEUROLOGY | Facility: CLINIC | Age: 25
End: 2020-06-05
Payer: MEDICAID

## 2020-06-05 VITALS
HEIGHT: 63 IN | HEART RATE: 95 BPM | BODY MASS INDEX: 27.11 KG/M2 | TEMPERATURE: 98 F | SYSTOLIC BLOOD PRESSURE: 123 MMHG | DIASTOLIC BLOOD PRESSURE: 76 MMHG | RESPIRATION RATE: 16 BRPM | WEIGHT: 153 LBS

## 2020-06-05 DIAGNOSIS — F32.A DEPRESSION, UNSPECIFIED DEPRESSION TYPE: ICD-10-CM

## 2020-06-05 DIAGNOSIS — Z91.148 NONCOMPLIANCE WITH MEDICATIONS: ICD-10-CM

## 2020-06-05 DIAGNOSIS — Z79.899 HIGH RISK MEDICATION USE: ICD-10-CM

## 2020-06-05 DIAGNOSIS — G40.909 SEIZURE DISORDER: Primary | ICD-10-CM

## 2020-06-05 DIAGNOSIS — R51.9 INTRACTABLE HEADACHE, UNSPECIFIED CHRONICITY PATTERN, UNSPECIFIED HEADACHE TYPE: ICD-10-CM

## 2020-06-05 PROCEDURE — 99215 PR OFFICE/OUTPT VISIT, EST, LEVL V, 40-54 MIN: ICD-10-PCS | Mod: S$PBB,,, | Performed by: PSYCHIATRY & NEUROLOGY

## 2020-06-05 PROCEDURE — 99999 PR PBB SHADOW E&M-EST. PATIENT-LVL V: ICD-10-PCS | Mod: PBBFAC,,, | Performed by: PSYCHIATRY & NEUROLOGY

## 2020-06-05 PROCEDURE — 99215 OFFICE O/P EST HI 40 MIN: CPT | Mod: S$PBB,,, | Performed by: PSYCHIATRY & NEUROLOGY

## 2020-06-05 PROCEDURE — 99215 OFFICE O/P EST HI 40 MIN: CPT | Mod: PBBFAC,PN | Performed by: PSYCHIATRY & NEUROLOGY

## 2020-06-05 PROCEDURE — 99999 PR PBB SHADOW E&M-EST. PATIENT-LVL V: CPT | Mod: PBBFAC,,, | Performed by: PSYCHIATRY & NEUROLOGY

## 2020-06-05 RX ORDER — ETONOGESTREL 68 MG/1
68 IMPLANT SUBCUTANEOUS ONCE
COMMUNITY
Start: 2020-03-26 | End: 2022-11-14

## 2020-06-05 NOTE — PROGRESS NOTES
"Date of service:  6/5/2020    Chief complaint:  Seizures    Interval history:  The patient is a 25 y.o. female seen previously for seizures.  I last saw her in 2/19.  She has missed multiple appointments since then.  She states that she has had 1 seizure since I last saw her.  She did not call us.  She is not sure when it happened.  It may have been in February or March of this year.  She had missed doses of medications just before the seizure.  She has been taking Zonegran 200 mg BID and Vimpat 100 mg BID.  She notes no side effects.  She endorses depression.  She has been seeing her OB/GYN for this.  She has tried Prozac.  This did not help, so she stopped it.  She denies SI/HI.  She cites significant social stressors.  She has had significant headaches since these stressors started.  It originates occipitally on the left and radiates forward.  She has never had a similar headache before.  Nothing helps her headache.  She does feel that sunlight makes it worse.  She has no new complaints otherwise.    History of present illness:  The patient is a 25 y.o. female referred for evaluation of episodes suspicious for seizures.  She has seen Dr. St.  This is my first time seeing her.     She gives no clear history of aura.  She does report having felt abnormally cold throughout the day leading up to her seizures, though.  Her seizure is characterized by a loss of consciousness and reported convulsive activity.   She did have tongue biting on the sides.  This component of this spell lasted for what sounds to be a matter of minutes and occurred back to back.  Afterwards, she had fatigue/confusion.  The patient had, she believes, 3-4 events on the day of her hospitalization in May.  She did have a seizure about 4 years ago while on Ultram.  Also, she had some episode with loss of consciousness and convulsions during a blood draw about 3 years ago.    The patient has a possible family history of seizures ("I heard that " "my great-great-grandmother had epilepsy."  Also seizures in 2 distant cousins who happen to be on different sides of the family.).  She reports no history of prenatal/ complications. There is no history of febrile seizures.  She notes no history of CNS infections. She claims no history of significant head trauma. There is no history of developmental delay.    Current AEDs:  Zonegran 200 mg BID  Vimpat 100 mg BID    Prior AEDs:  Lamictal    Prior HPI from Dr. St ():  "Patient is a RH 21 y.o. female presents with seizure activity. Pt with hx of past seizure precipitated from use of tramadol. On day of admission brought in with seizure activity, had second seizure event in ED, + tongue biting, no loss of bladder. No hx of head trauma, meningitis or febrile seizures. No illicit drug use or excessive alcohol. 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   Spoke with pt's andrzeje; she had two previous seizures, one from tramadol, one while giving blood. Recently has had some stressors, poor sleep. No new medications"      Past Medical History:   Diagnosis Date    Asthma     Depression affecting pregnancy     IUD (intrauterine device) in place     placed today 09/10/18    Mental disorder     Seizures     Epilepsy       Past Surgical History:   Procedure Laterality Date    ADENOIDECTOMY W/ MYRINGOTOMY AND TUBES      CHOLECYSTECTOMY         Family History   Problem Relation Age of Onset    Heart disease Father     Heart disease Maternal Grandfather     Heart disease Paternal Grandmother     Heart disease Paternal Grandfather     Congenital heart disease Neg Hx     Cardiomyopathy Neg Hx     Arrhythmia Neg Hx     Hypertension Neg Hx     Pacemaker/defibrilator Neg Hx          Social History     Socioeconomic History    Marital status: Single     Spouse name: Not on file    Number of children: Not on file    Years of education: Not on file    Highest education level: Not on " "file   Occupational History    Not on file   Social Needs    Financial resource strain: Not on file    Food insecurity:     Worry: Not on file     Inability: Not on file    Transportation needs:     Medical: Not on file     Non-medical: Not on file   Tobacco Use    Smoking status: Never Smoker    Smokeless tobacco: Never Used   Substance and Sexual Activity    Alcohol use: No     Frequency: Never    Drug use: No    Sexual activity: Not Currently     Partners: Male     Birth control/protection: None     Comment:     Lifestyle    Physical activity:     Days per week: Not on file     Minutes per session: Not on file    Stress: Not on file   Relationships    Social connections:     Talks on phone: Not on file     Gets together: Not on file     Attends Yazdanism service: Not on file     Active member of club or organization: Not on file     Attends meetings of clubs or organizations: Not on file     Relationship status: Not on file   Other Topics Concern    Patient feels they ought to cut down on drinking/drug use Not Asked    Patient annoyed by others criticizing their drinking/drug use Not Asked    Patient has felt bad or guilty about drinking/drug use Not Asked    Patient has had a drink/used drugs as an eye opener in the AM Not Asked   Social History Narrative    Fob - lad coronary artery bridge, sister enlarged heart      Review of Systems  A comprehensive ROS was previously performed.  It is not significantly changed except as noted above.     Physical exam:  /76   Pulse 95   Temp 97.7 °F (36.5 °C) (Temporal)   Resp 16   Ht 5' 3" (1.6 m)   Wt 69.4 kg (153 lb)   LMP 06/05/2020   BMI 27.10 kg/m²   General: Well developed, obese.  No acute distress.  HEENT: Atraumatic, normocephalic.  Neck: Supple, trachea midline.  Cardiovascular: Regular rate and rhythm.  Pulmonary: No increased work of breathing.  Abdomen/GI: No guarding.  Musculoskeletal: No obvious joint deformities, moves all " "extremities well.    Neurological exam:  Mental status: Awake and alert.  Oriented x4.  Speech fluent and appropriate.  Recent and remote memory appear to be intact.  Fund of knowledge normal.  Cranial nerves: Pupils equal round and reactive to light, extraocular movements intact, facial strength and sensation intact bilaterally, palate and tongue midline, hearing grossly intact bilaterally.  Motor: 5 out of 5 strength throughout the upper and lower extremities bilaterally. Normal bulk and tone.  Sensation: Intact to light touch and temperature bilaterally.  DTR: 2+ at the knees and biceps bilaterally.  Coordination: Finger-nose-finger testing intact bilaterally.  Gait: Normal gait. Good tandem.    Data base:  Notes of the referring physician were reviewed.  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    Labs (9/18):  CMP: unremarkable  CBC: includes HCT=36  ZNS: 26    MRI brain (5/16):  "Normal MRI of the brain with and without gadolinium"    EEG (5/16):  "IMPRESSION: This is an abnormal EEG during wakefulness, drowsiness and sleep. Independent left and right temporal focal slowing was noted. In addition, independent left and right temporal focal sharp waves were noted.  CLINICAL CORRELATION: The patient is a 21-year-old female with a history of seizures who is currently maintained on Keppra. This is an abnormal EEG during wakefulness, drowsiness and sleep. The presence of independent left and right temporal focal slowing is suggestive of focal neuronal dysfunction in these regions. The presence of independent left and right temporal sharp waves confers an increased risk of focal seizures from both temporal lobes. During this study, no seizures were recorded."    DEXA (9/17):  Normal    Assessment and plan:  The patient is a 25 y.o. female who presents for follow up on seizures. I suspect that her events represent partial onset seizures with secondary " generalization.  Based on the EEG, a temporal lobe focus is likely; however, it is not clear whether she has bilateral seizure foci or if all of her seizures originate from a single temporal lobe.  The etiology for her epilepsy is unclear. From an epilepsy management perspective, we will continue her Zonegran 200 mg BID and her Vimpat 100 mg BID.  We will check labs and DEXA for medication monitoring purposes.  Should she have further seizures I would likely increase her Vimpat.  If she should then have seizures on that regimen, I would advocate for an aggressive surgical evaluation, including inpatient vEEG monitoring, PET scan, neuropsychological evaluation, and so forth.  Medication side effects were discussed with the patient.  Medication compliance was emphasized.  State law as it pertains to driving for individuals with seizures has been discussed.  She is not to drive until seizure free for 6 months.  The patient has also been counseled on seizure safety.  Teratogenicity of AEDs were discussed.  She was instructed to make sure she was utilizing a reliable means of birth control.  Should she decide to become pregnant, she is to contact us first, so we can determine what medication adjustments, if any, are appropriate.  The patient was counseled that the risks posed by uncontrolled seizures typically exceeds that posed by the medications themselves. Consequently, the patient was advised of the importance of continuing her anticonvulsant medication should she become pregnant.  She was advised to take supplemental folate at 1 mg daily.  Should she desire to become pregnant or actually become pregnant, we will plan on increasing it to 4 mg daily.  We also discussed lactation issues with AEDs.  I also discussed with the patient the need for a PCP.      The patient's headaches sound most consistent with occipital neuralgia.  There are some migrainous features as well.  Since this is a new headache type, we will  obtain an MRI of the brain.  We will empirically try Neurontin for symptomatic relief.  We will have her follow up in headache clinic.    The patient reports that her depression remains problematic.  She is not presently having SI/HI.  We will refer her to psychiatry.  She will continue working with her OB/GYN in the meantime.    We will plan on seeing the patient back in a few weeks.

## 2020-06-08 ENCOUNTER — LAB VISIT (OUTPATIENT)
Dept: LAB | Facility: HOSPITAL | Age: 25
End: 2020-06-08
Attending: PSYCHIATRY & NEUROLOGY
Payer: MEDICAID

## 2020-06-08 DIAGNOSIS — G40.909 SEIZURE DISORDER: ICD-10-CM

## 2020-06-08 LAB
ALBUMIN SERPL BCP-MCNC: 3.8 G/DL (ref 3.5–5.2)
ALP SERPL-CCNC: 86 U/L (ref 55–135)
ALT SERPL W/O P-5'-P-CCNC: 16 U/L (ref 10–44)
ANION GAP SERPL CALC-SCNC: 7 MMOL/L (ref 8–16)
AST SERPL-CCNC: 16 U/L (ref 10–40)
BASOPHILS # BLD AUTO: 0.06 K/UL (ref 0–0.2)
BASOPHILS NFR BLD: 1.2 % (ref 0–1.9)
BILIRUB SERPL-MCNC: 0.3 MG/DL (ref 0.1–1)
BUN SERPL-MCNC: 15 MG/DL (ref 6–20)
CALCIUM SERPL-MCNC: 8.6 MG/DL (ref 8.7–10.5)
CHLORIDE SERPL-SCNC: 111 MMOL/L (ref 95–110)
CO2 SERPL-SCNC: 23 MMOL/L (ref 23–29)
CREAT SERPL-MCNC: 0.8 MG/DL (ref 0.5–1.4)
DIFFERENTIAL METHOD: ABNORMAL
EOSINOPHIL # BLD AUTO: 0.1 K/UL (ref 0–0.5)
EOSINOPHIL NFR BLD: 2.3 % (ref 0–8)
ERYTHROCYTE [DISTWIDTH] IN BLOOD BY AUTOMATED COUNT: 13 % (ref 11.5–14.5)
EST. GFR  (AFRICAN AMERICAN): >60 ML/MIN/1.73 M^2
EST. GFR  (NON AFRICAN AMERICAN): >60 ML/MIN/1.73 M^2
GLUCOSE SERPL-MCNC: 94 MG/DL (ref 70–110)
HCG SERPL QL: NEGATIVE
HCT VFR BLD AUTO: 36.8 % (ref 37–48.5)
HGB BLD-MCNC: 11.6 G/DL (ref 12–16)
IMM GRANULOCYTES # BLD AUTO: 0.02 K/UL (ref 0–0.04)
IMM GRANULOCYTES NFR BLD AUTO: 0.4 % (ref 0–0.5)
LYMPHOCYTES # BLD AUTO: 1.7 K/UL (ref 1–4.8)
LYMPHOCYTES NFR BLD: 33.3 % (ref 18–48)
MCH RBC QN AUTO: 29.1 PG (ref 27–31)
MCHC RBC AUTO-ENTMCNC: 31.5 G/DL (ref 32–36)
MCV RBC AUTO: 93 FL (ref 82–98)
MONOCYTES # BLD AUTO: 0.6 K/UL (ref 0.3–1)
MONOCYTES NFR BLD: 11 % (ref 4–15)
NEUTROPHILS # BLD AUTO: 2.7 K/UL (ref 1.8–7.7)
NEUTROPHILS NFR BLD: 51.8 % (ref 38–73)
NRBC BLD-RTO: 0 /100 WBC
PLATELET # BLD AUTO: 270 K/UL (ref 150–350)
PMV BLD AUTO: 10 FL (ref 9.2–12.9)
POTASSIUM SERPL-SCNC: 3.6 MMOL/L (ref 3.5–5.1)
PROT SERPL-MCNC: 7.1 G/DL (ref 6–8.4)
RBC # BLD AUTO: 3.98 M/UL (ref 4–5.4)
SODIUM SERPL-SCNC: 141 MMOL/L (ref 136–145)
WBC # BLD AUTO: 5.16 K/UL (ref 3.9–12.7)

## 2020-06-08 PROCEDURE — 80053 COMPREHEN METABOLIC PANEL: CPT

## 2020-06-08 PROCEDURE — 80203 DRUG SCREEN QUANT ZONISAMIDE: CPT

## 2020-06-08 PROCEDURE — 84703 CHORIONIC GONADOTROPIN ASSAY: CPT

## 2020-06-08 PROCEDURE — 85025 COMPLETE CBC W/AUTO DIFF WBC: CPT

## 2020-06-09 ENCOUNTER — HOSPITAL ENCOUNTER (OUTPATIENT)
Dept: RADIOLOGY | Facility: HOSPITAL | Age: 25
Discharge: HOME | End: 2020-06-09
Attending: PSYCHIATRY & NEUROLOGY
Payer: MEDICAID

## 2020-06-09 DIAGNOSIS — Z79.899 HIGH RISK MEDICATION USE: ICD-10-CM

## 2020-06-09 DIAGNOSIS — G40.909 SEIZURE DISORDER: ICD-10-CM

## 2020-06-09 PROCEDURE — 77080 DEXA BONE DENSITY SPINE HIP: ICD-10-PCS | Mod: 26,,, | Performed by: RADIOLOGY

## 2020-06-09 PROCEDURE — 77080 DXA BONE DENSITY AXIAL: CPT | Mod: TC

## 2020-06-09 PROCEDURE — 77080 DXA BONE DENSITY AXIAL: CPT | Mod: 26,,, | Performed by: RADIOLOGY

## 2020-06-10 LAB — ZONISAMIDE SERPL-MCNC: 27 MCG/ML (ref 10–40)

## 2020-06-19 RX ORDER — LACOSAMIDE 100 MG/1
1 TABLET, FILM COATED ORAL 2 TIMES DAILY
Qty: 60 TABLET | Refills: 0 | Status: CANCELLED | OUTPATIENT
Start: 2020-06-19 | End: 2021-06-19

## 2020-06-19 NOTE — TELEPHONE ENCOUNTER
----- Message from [facility] sent at 6/19/2020  4:12 PM CDT -----  [facility] called about pt lacosamide (VIMPAT) 100 mg Tab    Pharmacy [facility] [phone] Fax [phone]

## 2020-06-22 RX ORDER — LACOSAMIDE 100 MG/1
1 TABLET, FILM COATED ORAL 2 TIMES DAILY
Qty: 60 TABLET | Refills: 0 | Status: SHIPPED | OUTPATIENT
Start: 2020-06-22 | End: 2020-07-22

## 2020-06-22 NOTE — TELEPHONE ENCOUNTER
Refill sent to wrong physician.  I am not the doctor following her and was out on vacation when message forwarded to me.  Per chart review the issue has been resolved.

## 2020-06-22 NOTE — TELEPHONE ENCOUNTER
----- Message from Lita Moreau sent at 6/22/2020 10:13 AM CDT -----  Regarding: rx refill  Contact: [facility]    Contact: Tawanna ([facility])  Type:  Pharmacy Calling to Clarify an RX     Name of Caller:  Tawanna  Pharmacy Name:    [facility] - [facility] [address] [facility] [address]  [address] [address] [address]  Phone: [phone] Fax: [phone]  Prescription Name:  lacosamide (VIMPAT) 50 mg Tab  What do they need to clarify?:  emergency refill; controlled substance  Best Call Back Number:  [phone]  Additional Information:  NA

## 2020-06-23 ENCOUNTER — TELEPHONE (OUTPATIENT)
Dept: NEUROLOGY | Facility: CLINIC | Age: 25
End: 2020-06-23

## 2020-06-23 NOTE — TELEPHONE ENCOUNTER
----- Message from Sonya Tijerina MD sent at 6/23/2020  4:00 PM CDT -----  Labs are within normal limits. She should let us know if she has another seizure.

## 2021-01-08 ENCOUNTER — OFFICE VISIT (OUTPATIENT)
Dept: URGENT CARE | Facility: CLINIC | Age: 26
End: 2021-01-08
Payer: MEDICAID

## 2021-01-08 VITALS
TEMPERATURE: 98 F | HEART RATE: 76 BPM | SYSTOLIC BLOOD PRESSURE: 125 MMHG | DIASTOLIC BLOOD PRESSURE: 81 MMHG | RESPIRATION RATE: 18 BRPM | OXYGEN SATURATION: 98 %

## 2021-01-08 DIAGNOSIS — Z20.822 COVID-19 VIRUS NOT DETECTED: Primary | ICD-10-CM

## 2021-01-08 DIAGNOSIS — R51.9 GENERALIZED HEADACHE: ICD-10-CM

## 2021-01-08 DIAGNOSIS — R19.7 DIARRHEA, UNSPECIFIED TYPE: ICD-10-CM

## 2021-01-08 LAB
CTP QC/QA: YES
SARS-COV-2 RDRP RESP QL NAA+PROBE: NEGATIVE

## 2021-01-08 PROCEDURE — 87635 SARS-COV-2 COVID-19 AMP PRB: CPT | Mod: QW,S$GLB,, | Performed by: NURSE PRACTITIONER

## 2021-01-08 PROCEDURE — 99203 OFFICE O/P NEW LOW 30 MIN: CPT | Mod: S$GLB,,, | Performed by: NURSE PRACTITIONER

## 2021-01-08 PROCEDURE — 99203 PR OFFICE/OUTPT VISIT, NEW, LEVL III, 30-44 MIN: ICD-10-PCS | Mod: S$GLB,,, | Performed by: NURSE PRACTITIONER

## 2021-01-08 PROCEDURE — 87635 PR SARS-COV-2 COVID-19 AMPLIFIED PROBE: ICD-10-PCS | Mod: QW,S$GLB,, | Performed by: NURSE PRACTITIONER

## 2021-09-21 RX ORDER — LACOSAMIDE 100 MG/1
TABLET, FILM COATED ORAL
Qty: 60 TABLET | Refills: 0 | Status: SHIPPED | OUTPATIENT
Start: 2021-09-21 | End: 2021-10-25

## 2021-10-23 DIAGNOSIS — G40.109 LOCALIZATION-RELATED EPILEPSY: ICD-10-CM

## 2021-10-25 RX ORDER — ZONISAMIDE 100 MG/1
CAPSULE ORAL
Qty: 120 CAPSULE | Refills: 0 | Status: SHIPPED | OUTPATIENT
Start: 2021-10-25 | End: 2021-12-13

## 2021-10-25 RX ORDER — LACOSAMIDE 100 MG/1
TABLET, FILM COATED ORAL
Qty: 60 TABLET | Refills: 0 | Status: SHIPPED | OUTPATIENT
Start: 2021-10-25 | End: 2021-11-29

## 2021-11-29 RX ORDER — LACOSAMIDE 100 MG/1
TABLET, FILM COATED ORAL
Qty: 60 TABLET | Refills: 0 | Status: SHIPPED | OUTPATIENT
Start: 2021-11-29 | End: 2022-01-07

## 2022-01-07 RX ORDER — LACOSAMIDE 100 MG/1
TABLET, FILM COATED ORAL
Qty: 60 TABLET | Refills: 0 | Status: SHIPPED | OUTPATIENT
Start: 2022-01-07 | End: 2022-01-31

## 2022-01-18 ENCOUNTER — LAB VISIT (OUTPATIENT)
Dept: LAB | Facility: HOSPITAL | Age: 27
End: 2022-01-18
Attending: PSYCHIATRY & NEUROLOGY
Payer: MEDICAID

## 2022-01-18 ENCOUNTER — TELEPHONE (OUTPATIENT)
Dept: NEUROLOGY | Facility: CLINIC | Age: 27
End: 2022-01-18
Payer: MEDICAID

## 2022-01-18 ENCOUNTER — OFFICE VISIT (OUTPATIENT)
Dept: NEUROLOGY | Facility: CLINIC | Age: 27
End: 2022-01-18
Payer: MEDICAID

## 2022-01-18 VITALS
BODY MASS INDEX: 35.32 KG/M2 | SYSTOLIC BLOOD PRESSURE: 135 MMHG | RESPIRATION RATE: 18 BRPM | HEART RATE: 93 BPM | WEIGHT: 199.44 LBS | DIASTOLIC BLOOD PRESSURE: 74 MMHG

## 2022-01-18 DIAGNOSIS — R41.3 MEMORY LOSS: ICD-10-CM

## 2022-01-18 DIAGNOSIS — G40.909 SEIZURE DISORDER: Primary | ICD-10-CM

## 2022-01-18 DIAGNOSIS — F32.A DEPRESSION, UNSPECIFIED DEPRESSION TYPE: ICD-10-CM

## 2022-01-18 DIAGNOSIS — G40.909 SEIZURE DISORDER: ICD-10-CM

## 2022-01-18 LAB
ALBUMIN SERPL BCP-MCNC: 3.8 G/DL (ref 3.5–5.2)
ALP SERPL-CCNC: 99 U/L (ref 55–135)
ALT SERPL W/O P-5'-P-CCNC: 15 U/L (ref 10–44)
ANION GAP SERPL CALC-SCNC: 10 MMOL/L (ref 8–16)
AST SERPL-CCNC: 16 U/L (ref 10–40)
BASOPHILS # BLD AUTO: 0.06 K/UL (ref 0–0.2)
BASOPHILS NFR BLD: 0.8 % (ref 0–1.9)
BILIRUB SERPL-MCNC: 0.3 MG/DL (ref 0.1–1)
BUN SERPL-MCNC: 12 MG/DL (ref 6–20)
CALCIUM SERPL-MCNC: 9.4 MG/DL (ref 8.7–10.5)
CHLORIDE SERPL-SCNC: 110 MMOL/L (ref 95–110)
CO2 SERPL-SCNC: 19 MMOL/L (ref 23–29)
CREAT SERPL-MCNC: 0.7 MG/DL (ref 0.5–1.4)
DIFFERENTIAL METHOD: ABNORMAL
EOSINOPHIL # BLD AUTO: 0.1 K/UL (ref 0–0.5)
EOSINOPHIL NFR BLD: 0.9 % (ref 0–8)
ERYTHROCYTE [DISTWIDTH] IN BLOOD BY AUTOMATED COUNT: 12.2 % (ref 11.5–14.5)
EST. GFR  (AFRICAN AMERICAN): >60 ML/MIN/1.73 M^2
EST. GFR  (NON AFRICAN AMERICAN): >60 ML/MIN/1.73 M^2
GLUCOSE SERPL-MCNC: 95 MG/DL (ref 70–110)
HCT VFR BLD AUTO: 41.5 % (ref 37–48.5)
HGB BLD-MCNC: 13.2 G/DL (ref 12–16)
IMM GRANULOCYTES # BLD AUTO: 0.03 K/UL (ref 0–0.04)
IMM GRANULOCYTES NFR BLD AUTO: 0.4 % (ref 0–0.5)
LYMPHOCYTES # BLD AUTO: 1.8 K/UL (ref 1–4.8)
LYMPHOCYTES NFR BLD: 23.8 % (ref 18–48)
MCH RBC QN AUTO: 28.9 PG (ref 27–31)
MCHC RBC AUTO-ENTMCNC: 31.8 G/DL (ref 32–36)
MCV RBC AUTO: 91 FL (ref 82–98)
MONOCYTES # BLD AUTO: 0.7 K/UL (ref 0.3–1)
MONOCYTES NFR BLD: 8.5 % (ref 4–15)
NEUTROPHILS # BLD AUTO: 5.1 K/UL (ref 1.8–7.7)
NEUTROPHILS NFR BLD: 65.6 % (ref 38–73)
NRBC BLD-RTO: 0 /100 WBC
PLATELET # BLD AUTO: 437 K/UL (ref 150–450)
PMV BLD AUTO: 10.8 FL (ref 9.2–12.9)
POTASSIUM SERPL-SCNC: 4.2 MMOL/L (ref 3.5–5.1)
PROT SERPL-MCNC: 7.6 G/DL (ref 6–8.4)
RBC # BLD AUTO: 4.56 M/UL (ref 4–5.4)
SODIUM SERPL-SCNC: 139 MMOL/L (ref 136–145)
WBC # BLD AUTO: 7.74 K/UL (ref 3.9–12.7)

## 2022-01-18 PROCEDURE — 99999 PR PBB SHADOW E&M-EST. PATIENT-LVL IV: CPT | Mod: PBBFAC,,, | Performed by: PSYCHIATRY & NEUROLOGY

## 2022-01-18 PROCEDURE — 3008F BODY MASS INDEX DOCD: CPT | Mod: CPTII,,, | Performed by: PSYCHIATRY & NEUROLOGY

## 2022-01-18 PROCEDURE — 85025 COMPLETE CBC W/AUTO DIFF WBC: CPT | Performed by: PSYCHIATRY & NEUROLOGY

## 2022-01-18 PROCEDURE — 36415 COLL VENOUS BLD VENIPUNCTURE: CPT | Mod: PO | Performed by: PSYCHIATRY & NEUROLOGY

## 2022-01-18 PROCEDURE — 3078F DIAST BP <80 MM HG: CPT | Mod: CPTII,,, | Performed by: PSYCHIATRY & NEUROLOGY

## 2022-01-18 PROCEDURE — 1159F PR MEDICATION LIST DOCUMENTED IN MEDICAL RECORD: ICD-10-PCS | Mod: CPTII,,, | Performed by: PSYCHIATRY & NEUROLOGY

## 2022-01-18 PROCEDURE — 3008F PR BODY MASS INDEX (BMI) DOCUMENTED: ICD-10-PCS | Mod: CPTII,,, | Performed by: PSYCHIATRY & NEUROLOGY

## 2022-01-18 PROCEDURE — 1159F MED LIST DOCD IN RCRD: CPT | Mod: CPTII,,, | Performed by: PSYCHIATRY & NEUROLOGY

## 2022-01-18 PROCEDURE — 3075F PR MOST RECENT SYSTOLIC BLOOD PRESS GE 130-139MM HG: ICD-10-PCS | Mod: CPTII,,, | Performed by: PSYCHIATRY & NEUROLOGY

## 2022-01-18 PROCEDURE — 3078F PR MOST RECENT DIASTOLIC BLOOD PRESSURE < 80 MM HG: ICD-10-PCS | Mod: CPTII,,, | Performed by: PSYCHIATRY & NEUROLOGY

## 2022-01-18 PROCEDURE — 99214 OFFICE O/P EST MOD 30 MIN: CPT | Mod: S$PBB,,, | Performed by: PSYCHIATRY & NEUROLOGY

## 2022-01-18 PROCEDURE — 99214 OFFICE O/P EST MOD 30 MIN: CPT | Mod: PBBFAC,PO | Performed by: PSYCHIATRY & NEUROLOGY

## 2022-01-18 PROCEDURE — 99214 PR OFFICE/OUTPT VISIT, EST, LEVL IV, 30-39 MIN: ICD-10-PCS | Mod: S$PBB,,, | Performed by: PSYCHIATRY & NEUROLOGY

## 2022-01-18 PROCEDURE — 80053 COMPREHEN METABOLIC PANEL: CPT | Performed by: PSYCHIATRY & NEUROLOGY

## 2022-01-18 PROCEDURE — 3075F SYST BP GE 130 - 139MM HG: CPT | Mod: CPTII,,, | Performed by: PSYCHIATRY & NEUROLOGY

## 2022-01-18 PROCEDURE — 99999 PR PBB SHADOW E&M-EST. PATIENT-LVL IV: ICD-10-PCS | Mod: PBBFAC,,, | Performed by: PSYCHIATRY & NEUROLOGY

## 2022-01-18 RX ORDER — LEVETIRACETAM 750 MG/1
750 TABLET ORAL 2 TIMES DAILY
Qty: 60 TABLET | Refills: 11 | OUTPATIENT
Start: 2022-01-18 | End: 2022-04-12

## 2022-01-18 NOTE — PROGRESS NOTES
"Date of service:  2022    Chief complaint:  Seizures    Interval history:  The patient is a 26 y.o. female seen previously for seizures.  I last saw her in .  She has missed multiple appointments since then.  She states that she is unsure if she has had any seizures since I last saw her.  She has been taking Zonegran 200 mg BID and Vimpat 100 mg BID.  She notes no side effects.  She feels like her memory is problematic.  She states this has been an issue since 2017.  She endorses continued issues with depression.  She denies SI/HI.  She is still seeing her OB/GYN for this.  She has stopped her Prozac.  She has no new complaints otherwise.    History of present illness:  The patient is a 26 y.o. female referred for evaluation of episodes suspicious for seizures.  She has seen Dr. St.  This is my first time seeing her.     She gives no clear history of aura.  She does report having felt abnormally cold throughout the day leading up to her seizures, though.  Her seizure is characterized by a loss of consciousness and reported convulsive activity.   She did have tongue biting on the sides.  This component of this spell lasted for what sounds to be a matter of minutes and occurred back to back.  Afterwards, she had fatigue/confusion.  The patient had, she believes, 3-4 events on the day of her hospitalization in May.  She did have a seizure about 4 years ago while on Ultram.  Also, she had some episode with loss of consciousness and convulsions during a blood draw about 3 years ago.    The patient has a possible family history of seizures ("I heard that my great-great-grandmother had epilepsy."  Also seizures in 2 distant cousins who happen to be on different sides of the family.).  She reports no history of prenatal/ complications. There is no history of febrile seizures.  She notes no history of CNS infections. She claims no history of significant head trauma. There is no history of " "developmental delay.    Current AEDs:  Zonegran 200 mg BID  Vimpat 100 mg BID    Prior AEDs:  Lamictal    Prior HPI from Dr. St (5/16):  "Patient is a RH 21 y.o. female presents with seizure activity. Pt with hx of past seizure precipitated from use of tramadol. On day of admission brought in with seizure activity, had second seizure event in ED, + tongue biting, no loss of bladder. No hx of head trauma, meningitis or febrile seizures. No illicit drug use or excessive alcohol. 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   Spoke with pt's matthew; she had two previous seizures, one from tramadol, one while giving blood. Recently has had some stressors, poor sleep. No new medications"      Past Medical History:   Diagnosis Date    Asthma     Depression affecting pregnancy     IUD (intrauterine device) in place     placed today 09/10/18    Mental disorder     Seizures     Epilepsy       Past Surgical History:   Procedure Laterality Date    ADENOIDECTOMY W/ MYRINGOTOMY AND TUBES      CHOLECYSTECTOMY         Family History   Problem Relation Age of Onset    Heart disease Father     Heart disease Maternal Grandfather     Heart disease Paternal Grandmother     Heart disease Paternal Grandfather     Congenital heart disease Neg Hx     Cardiomyopathy Neg Hx     Arrhythmia Neg Hx     Hypertension Neg Hx     Pacemaker/defibrilator Neg Hx          Social History     Socioeconomic History    Marital status: Single   Tobacco Use    Smoking status: Never Smoker    Smokeless tobacco: Never Used   Substance and Sexual Activity    Alcohol use: No    Drug use: No    Sexual activity: Not Currently     Partners: Male     Birth control/protection: None     Comment:     Social History Narrative    Fob - lad coronary artery bridge, sister enlarged heart      Review of Systems  A comprehensive ROS was previously performed.  It is not significantly changed except as noted above.     Physical " "exam:  /74 (BP Location: Left arm, Patient Position: Sitting, BP Method: Medium (Automatic))   Pulse 93   Resp 18   Wt 90.5 kg (199 lb 6.5 oz)   BMI 35.32 kg/m²   General: Well developed, obese.  No acute distress.  HEENT: Atraumatic, normocephalic.  Neck: Supple, trachea midline.  Cardiovascular: Regular rate and rhythm.  Pulmonary: No increased work of breathing.  Abdomen/GI: No guarding.  Musculoskeletal: No obvious joint deformities, moves all extremities well.    Neurological exam:  Mental status: Awake and alert.  Oriented x4.  Speech fluent and appropriate.  Recent and remote memory appear to be intact.  Fund of knowledge normal.  Cranial nerves: Pupils equal round and reactive to light, extraocular movements intact, facial strength and sensation intact bilaterally, palate and tongue midline, hearing grossly intact bilaterally.  Motor: 5 out of 5 strength throughout the upper and lower extremities bilaterally. Normal bulk and tone.  Sensation: Intact to light touch and temperature bilaterally.  DTR: 2+ at the knees and biceps bilaterally.  Coordination: Finger-nose-finger testing intact bilaterally.  Gait: Normal gait. Good tandem.    Data base:  Notes of the referring physician were reviewed.  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    Labs (9/18):  CMP: unremarkable  CBC: includes HCT=36  ZNS: 26    MRI brain (5/16):  "Normal MRI of the brain with and without gadolinium"    EEG (5/16):  "IMPRESSION: This is an abnormal EEG during wakefulness, drowsiness and sleep. Independent left and right temporal focal slowing was noted. In addition, independent left and right temporal focal sharp waves were noted.  CLINICAL CORRELATION: The patient is a 21-year-old female with a history of seizures who is currently maintained on Keppra. This is an abnormal EEG during wakefulness, drowsiness and sleep. The presence of independent left and right " "temporal focal slowing is suggestive of focal neuronal dysfunction in these regions. The presence of independent left and right temporal sharp waves confers an increased risk of focal seizures from both temporal lobes. During this study, no seizures were recorded."    THOMAS (9/17):  Normal    Assessment and plan:  The patient is a 26 y.o. female who presents for follow up on seizures. I suspect that her events represent partial onset seizures with secondary generalization.  Based on the EEG, a temporal lobe focus is likely; however, it is not clear whether she has bilateral seizure foci or if all of her seizures originate from a single temporal lobe.  The etiology for her epilepsy is unclear. From an epilepsy management perspective, we will continue her Vimpat 100 mg BID and transition from Zonegran to Keppra due to memory concerns.  We will check labs for medication monitoring purposes.  Should she have further seizures I would likely increase her Vimpat/Keppra.  If she should then have seizures on that regimen, I would advocate for an aggressive surgical evaluation, including inpatient vEEG monitoring, PET scan, neuropsychological evaluation, and so forth.  Medication side effects were discussed with the patient.  Medication compliance was emphasized.  State law as it pertains to driving for individuals with seizures has been discussed.  She is not to drive until seizure free for 6 months.  The patient has also been counseled on seizure safety.  Teratogenicity of AEDs were discussed.  She was instructed to make sure she was utilizing a reliable means of birth control.  Should she decide to become pregnant, she is to contact us first, so we can determine what medication adjustments, if any, are appropriate.  The patient was counseled that the risks posed by uncontrolled seizures typically exceeds that posed by the medications themselves. Consequently, the patient was advised of the importance of continuing her " anticonvulsant medication should she become pregnant.  She was advised to take supplemental folate at 1 mg daily.  Should she desire to become pregnant or actually become pregnant, we will plan on increasing it to 4 mg daily.  We also discussed lactation issues with AEDs.  I also discussed with the patient the need for a PCP.      Regarding the patient's memory issues, the differential includes memory changes related to epilepsy, medication effects, and depression.  We will make medication changes as above.  We will obtain neuropsychological testing.    We will plan on seeing the patient back in a few weeks.    A total of 33 minutes of total time spent on the encounter, which includes face to face time and non-face to face time preparing to see the patient (eg, review of tests), Obtaining and/or reviewing separately obtained history, documenting clinical information in the electronic or other health record, independently interpreting results (not separately reported)/communicating results to the patient/family/caregiver, and/or care coordination (not separately reported).

## 2022-01-18 NOTE — PROGRESS NOTES
"      MMSE 1/18/2022   What is the (year), (season), (date), (day), (month)? 4   Where are we (state), (country), (town or city), (hospital), (floor)? 5   Name 3 common objects (eg. "apple", "table", "ana"). Take 1 second to say each. Then ask the patient to repeat all 3. Give 1 point for each correct answer. Then repeat them until he/she learns all 3. Count trials and record. 3   Serial 7's backwards. Stop after 5 answers. (100,93,86,79,72) or alternatively  spell "WORLD" backwards. (D..L..R..O..W). The score is the number of letters in correct order. 5   Ask for the 3 common objects named earlier in the exam. Give 1 point for each correct answer. 3   Name a "pencil" and "watch." 2   Repeat the following: "No ifs, ands, or buts." 1   Follow a 3-stage command: "Take a paper in your right hand, fold it in half, & put it on the floor." 3   Read and obey the following: (see paper exam) 1   Write a sentence. 1   Copy the following design: (see paper exam) 0   Total MMSE Score 28   Some recent data might be hidden     "

## 2022-02-04 ENCOUNTER — HOSPITAL ENCOUNTER (EMERGENCY)
Facility: HOSPITAL | Age: 27
Discharge: LEFT BEFORE DISCHARGE | End: 2022-02-04
Attending: EMERGENCY MEDICINE
Payer: MEDICAID

## 2022-02-04 VITALS
BODY MASS INDEX: 32.78 KG/M2 | HEIGHT: 63 IN | TEMPERATURE: 99 F | OXYGEN SATURATION: 99 % | WEIGHT: 185 LBS | DIASTOLIC BLOOD PRESSURE: 80 MMHG | HEART RATE: 88 BPM | RESPIRATION RATE: 16 BRPM | SYSTOLIC BLOOD PRESSURE: 120 MMHG

## 2022-02-04 DIAGNOSIS — Z53.29 LEFT AGAINST MEDICAL ADVICE: Primary | ICD-10-CM

## 2022-02-04 DIAGNOSIS — R56.9 SEIZURE: ICD-10-CM

## 2022-02-04 LAB
ALBUMIN SERPL BCP-MCNC: 3.9 G/DL (ref 3.5–5.2)
ALP SERPL-CCNC: 64 U/L (ref 55–135)
ALT SERPL W/O P-5'-P-CCNC: 14 U/L (ref 10–44)
AMPHET+METHAMPHET UR QL: NEGATIVE
ANION GAP SERPL CALC-SCNC: 9 MMOL/L (ref 8–16)
AST SERPL-CCNC: 21 U/L (ref 10–40)
B-HCG UR QL: NEGATIVE
B-HCG UR QL: NEGATIVE
BACTERIA #/AREA URNS HPF: NEGATIVE /HPF
BARBITURATES UR QL SCN>200 NG/ML: NEGATIVE
BASOPHILS # BLD AUTO: 0.04 K/UL (ref 0–0.2)
BASOPHILS NFR BLD: 0.5 % (ref 0–1.9)
BENZODIAZ UR QL SCN>200 NG/ML: NEGATIVE
BILIRUB SERPL-MCNC: 0.8 MG/DL (ref 0.1–1)
BILIRUB UR QL STRIP: NEGATIVE
BUN SERPL-MCNC: 13 MG/DL (ref 6–20)
BZE UR QL SCN: NEGATIVE
CALCIUM SERPL-MCNC: 8.7 MG/DL (ref 8.7–10.5)
CANNABINOIDS UR QL SCN: ABNORMAL
CHLORIDE SERPL-SCNC: 107 MMOL/L (ref 95–110)
CK SERPL-CCNC: 73 U/L (ref 20–180)
CLARITY UR: ABNORMAL
CO2 SERPL-SCNC: 21 MMOL/L (ref 23–29)
COLOR UR: YELLOW
CREAT SERPL-MCNC: 0.6 MG/DL (ref 0.5–1.4)
CREAT UR-MCNC: 177 MG/DL (ref 15–325)
CTP QC/QA: YES
DIFFERENTIAL METHOD: ABNORMAL
EOSINOPHIL # BLD AUTO: 0.1 K/UL (ref 0–0.5)
EOSINOPHIL NFR BLD: 0.9 % (ref 0–8)
ERYTHROCYTE [DISTWIDTH] IN BLOOD BY AUTOMATED COUNT: 12.2 % (ref 11.5–14.5)
EST. GFR  (AFRICAN AMERICAN): >60 ML/MIN/1.73 M^2
EST. GFR  (NON AFRICAN AMERICAN): >60 ML/MIN/1.73 M^2
GLUCOSE SERPL-MCNC: 101 MG/DL (ref 70–110)
GLUCOSE UR QL STRIP: NEGATIVE
HCT VFR BLD AUTO: 41 % (ref 37–48.5)
HGB BLD-MCNC: 13.4 G/DL (ref 12–16)
HGB UR QL STRIP: ABNORMAL
HYALINE CASTS #/AREA URNS LPF: 16 /LPF
IMM GRANULOCYTES # BLD AUTO: 0.03 K/UL (ref 0–0.04)
IMM GRANULOCYTES NFR BLD AUTO: 0.4 % (ref 0–0.5)
KETONES UR QL STRIP: ABNORMAL
LEUKOCYTE ESTERASE UR QL STRIP: NEGATIVE
LIPASE SERPL-CCNC: 34 U/L (ref 4–60)
LYMPHOCYTES # BLD AUTO: 1.3 K/UL (ref 1–4.8)
LYMPHOCYTES NFR BLD: 16.9 % (ref 18–48)
MAGNESIUM SERPL-MCNC: 2.1 MG/DL (ref 1.6–2.6)
MCH RBC QN AUTO: 28.3 PG (ref 27–31)
MCHC RBC AUTO-ENTMCNC: 32.7 G/DL (ref 32–36)
MCV RBC AUTO: 87 FL (ref 82–98)
MICROSCOPIC COMMENT: ABNORMAL
MONOCYTES # BLD AUTO: 0.6 K/UL (ref 0.3–1)
MONOCYTES NFR BLD: 7.8 % (ref 4–15)
NEUTROPHILS # BLD AUTO: 5.8 K/UL (ref 1.8–7.7)
NEUTROPHILS NFR BLD: 73.5 % (ref 38–73)
NITRITE UR QL STRIP: NEGATIVE
NRBC BLD-RTO: 0 /100 WBC
OPIATES UR QL SCN: NEGATIVE
PCP UR QL SCN>25 NG/ML: NEGATIVE
PH UR STRIP: 7 [PH] (ref 5–8)
PLATELET # BLD AUTO: 235 K/UL (ref 150–450)
PMV BLD AUTO: 10.8 FL (ref 9.2–12.9)
POTASSIUM SERPL-SCNC: 4.3 MMOL/L (ref 3.5–5.1)
PROT SERPL-MCNC: 7.6 G/DL (ref 6–8.4)
PROT UR QL STRIP: ABNORMAL
RBC # BLD AUTO: 4.74 M/UL (ref 4–5.4)
RBC #/AREA URNS HPF: 3 /HPF (ref 0–4)
SARS-COV-2 RDRP RESP QL NAA+PROBE: NEGATIVE
SODIUM SERPL-SCNC: 137 MMOL/L (ref 136–145)
SP GR UR STRIP: 1.03 (ref 1–1.03)
SQUAMOUS #/AREA URNS HPF: 8 /HPF
TOXICOLOGY INFORMATION: ABNORMAL
TSH SERPL DL<=0.005 MIU/L-ACNC: 1.42 UIU/ML (ref 0.34–5.6)
URN SPEC COLLECT METH UR: ABNORMAL
UROBILINOGEN UR STRIP-ACNC: ABNORMAL EU/DL
WBC # BLD AUTO: 7.91 K/UL (ref 3.9–12.7)
WBC #/AREA URNS HPF: 7 /HPF (ref 0–5)

## 2022-02-04 PROCEDURE — 81001 URINALYSIS AUTO W/SCOPE: CPT | Performed by: EMERGENCY MEDICINE

## 2022-02-04 PROCEDURE — 85025 COMPLETE CBC W/AUTO DIFF WBC: CPT | Performed by: EMERGENCY MEDICINE

## 2022-02-04 PROCEDURE — 80053 COMPREHEN METABOLIC PANEL: CPT | Performed by: EMERGENCY MEDICINE

## 2022-02-04 PROCEDURE — 81025 URINE PREGNANCY TEST: CPT | Performed by: EMERGENCY MEDICINE

## 2022-02-04 PROCEDURE — 93010 EKG 12-LEAD: ICD-10-PCS | Mod: ,,, | Performed by: SPECIALIST

## 2022-02-04 PROCEDURE — 83690 ASSAY OF LIPASE: CPT | Performed by: EMERGENCY MEDICINE

## 2022-02-04 PROCEDURE — 84443 ASSAY THYROID STIM HORMONE: CPT | Performed by: EMERGENCY MEDICINE

## 2022-02-04 PROCEDURE — 93005 ELECTROCARDIOGRAM TRACING: CPT | Performed by: SPECIALIST

## 2022-02-04 PROCEDURE — 82550 ASSAY OF CK (CPK): CPT | Performed by: EMERGENCY MEDICINE

## 2022-02-04 PROCEDURE — U0002 COVID-19 LAB TEST NON-CDC: HCPCS | Performed by: EMERGENCY MEDICINE

## 2022-02-04 PROCEDURE — 83735 ASSAY OF MAGNESIUM: CPT | Performed by: EMERGENCY MEDICINE

## 2022-02-04 PROCEDURE — 96376 TX/PRO/DX INJ SAME DRUG ADON: CPT

## 2022-02-04 PROCEDURE — 80307 DRUG TEST PRSMV CHEM ANLYZR: CPT | Performed by: EMERGENCY MEDICINE

## 2022-02-04 PROCEDURE — 93010 ELECTROCARDIOGRAM REPORT: CPT | Mod: ,,, | Performed by: SPECIALIST

## 2022-02-04 PROCEDURE — 63600175 PHARM REV CODE 636 W HCPCS: Performed by: EMERGENCY MEDICINE

## 2022-02-04 PROCEDURE — 96374 THER/PROPH/DIAG INJ IV PUSH: CPT

## 2022-02-04 PROCEDURE — 99284 EMERGENCY DEPT VISIT MOD MDM: CPT | Mod: 25

## 2022-02-04 RX ORDER — LORAZEPAM 2 MG/ML
0.5 INJECTION INTRAMUSCULAR
Status: COMPLETED | OUTPATIENT
Start: 2022-02-04 | End: 2022-02-04

## 2022-02-04 RX ADMIN — LORAZEPAM 0.5 MG: 2 INJECTION INTRAMUSCULAR; INTRAVENOUS at 05:02

## 2022-02-04 RX ADMIN — LORAZEPAM 0.5 MG: 2 INJECTION INTRAMUSCULAR; INTRAVENOUS at 10:02

## 2022-02-05 NOTE — ED PROVIDER NOTES
Encounter Date: 2/4/2022       History     Chief Complaint   Patient presents with    Seizures     Pt had a witnessed seizure that lasted 4-5 min per family.  Switching from zonagran to keppra     26-year-old female with history of epilepsy presents after a seizure.  Patient is currently being changed from sonogram to Keppra as she was having continued breakthrough seizures on sonogram.  She has also missed multiple doses of her medications recently.  Family members report seizure activity but denied trauma.  Patient postictal while in route with EMS and in the ED.  currently is awake alert and oriented however is answering questions slowly.  She denies any pain or discomfort.  She denies any other problems or complaints.        Review of patient's allergies indicates:   Allergen Reactions    Benadryl [diphenhydramine hcl] Other (See Comments)     Lowers seizure threshold.    Quinolones Other (See Comments)     Lowers seizure threshold.     Tramadol Other (See Comments)     seizures     Past Medical History:   Diagnosis Date    Asthma     Depression affecting pregnancy     IUD (intrauterine device) in place     placed today 09/10/18    Mental disorder     Seizures     Epilepsy     Past Surgical History:   Procedure Laterality Date    ADENOIDECTOMY W/ MYRINGOTOMY AND TUBES      CHOLECYSTECTOMY       Family History   Problem Relation Age of Onset    Heart disease Father     Heart disease Maternal Grandfather     Heart disease Paternal Grandmother     Heart disease Paternal Grandfather     Congenital heart disease Neg Hx     Cardiomyopathy Neg Hx     Arrhythmia Neg Hx     Hypertension Neg Hx     Pacemaker/defibrilator Neg Hx      Social History     Tobacco Use    Smoking status: Never Smoker    Smokeless tobacco: Never Used   Substance Use Topics    Alcohol use: No    Drug use: No     Review of Systems   Constitutional: Negative.  Negative for activity change, appetite change, chills, fatigue  and fever.   HENT: Negative.  Negative for congestion, dental problem, ear pain, rhinorrhea, sinus pressure, sinus pain, sore throat and trouble swallowing.    Eyes: Negative.  Negative for photophobia, pain, redness and visual disturbance.   Respiratory: Negative.  Negative for cough, chest tightness, shortness of breath and wheezing.    Cardiovascular: Negative.  Negative for chest pain, palpitations and leg swelling.   Gastrointestinal: Negative.  Negative for abdominal distention, abdominal pain, anal bleeding, blood in stool, constipation, diarrhea, nausea and vomiting.   Endocrine: Negative.    Genitourinary: Negative.  Negative for decreased urine volume, difficulty urinating, dysuria, flank pain, frequency, hematuria, pelvic pain and urgency.   Musculoskeletal: Negative.  Negative for arthralgias, back pain, gait problem, joint swelling, myalgias, neck pain and neck stiffness.   Skin: Negative.  Negative for color change, pallor and rash.   Neurological: Positive for seizures. Negative for dizziness, tremors, syncope, facial asymmetry, speech difficulty, weakness, light-headedness, numbness and headaches.   Hematological: Negative.  Does not bruise/bleed easily.   Psychiatric/Behavioral: Negative.  Negative for confusion.   All other systems reviewed and are negative.      Physical Exam     Initial Vitals [02/04/22 1720]   BP Pulse Resp Temp SpO2   115/77 107 18 97.7 °F (36.5 °C) 100 %      MAP       --         Physical Exam    Nursing note and vitals reviewed.  Constitutional: She appears well-developed and well-nourished. She is active and cooperative.  Non-toxic appearance. She does not have a sickly appearance. She does not appear ill. No distress.   HENT:   Head: Normocephalic and atraumatic.   Right Ear: Tympanic membrane normal.   Left Ear: Tympanic membrane normal.   Nose: Nose normal.   Mouth/Throat: Uvula is midline, oropharynx is clear and moist and mucous membranes are normal. No oral lesions. No  uvula swelling. No oropharyngeal exudate, posterior oropharyngeal edema or posterior oropharyngeal erythema.   Eyes: Conjunctivae, EOM and lids are normal. Pupils are equal, round, and reactive to light. Right eye exhibits no discharge. Left eye exhibits no discharge. No scleral icterus.   Neck: Trachea normal and phonation normal. Neck supple. No thyroid mass and no thyromegaly present. No stridor present. No tracheal deviation present. No JVD present.   Normal range of motion.   Full passive range of motion without pain.     Cardiovascular: Normal rate, regular rhythm, normal heart sounds, intact distal pulses and normal pulses. Exam reveals no gallop, no distant heart sounds and no friction rub.    No murmur heard.  Pulmonary/Chest: Effort normal and breath sounds normal. No accessory muscle usage or stridor. No tachypnea. No respiratory distress. She has no wheezes. She has no rhonchi. She has no rales.   Abdominal: Abdomen is soft. Normal appearance and bowel sounds are normal. She exhibits no distension, no pulsatile midline mass and no mass. There is no abdominal tenderness. There is no rigidity, no guarding and no CVA tenderness.   Musculoskeletal:         General: No tenderness or edema. Normal range of motion.      Cervical back: Full passive range of motion without pain, normal range of motion and neck supple. No edema, erythema or rigidity. No spinous process tenderness or muscular tenderness. Normal range of motion.      Comments: Pulses are 2+ throughout, cap refill is less than 2 sec throughout, extremities are nontender throughout with full range of motion. There is no spinal tenderness to palpation.     Neurological: She is alert and oriented to person, place, and time. She has normal strength and normal reflexes. She displays normal reflexes. No cranial nerve deficit or sensory deficit. Gait normal. GCS score is 15. GCS eye subscore is 4. GCS verbal subscore is 5. GCS motor subscore is 6.   No  focal deficits.   Skin: Skin is warm, dry and intact. Capillary refill takes less than 2 seconds. No ecchymosis, no petechiae and no rash noted. No erythema. No pallor.   Psychiatric: She has a normal mood and affect. Her speech is normal and behavior is normal. Judgment and thought content normal. Cognition and memory are normal.         ED Course   Procedures  Labs Reviewed   CBC W/ AUTO DIFFERENTIAL - Abnormal; Notable for the following components:       Result Value    Gran % 73.5 (*)     Lymph % 16.9 (*)     All other components within normal limits   COMPREHENSIVE METABOLIC PANEL - Abnormal; Notable for the following components:    CO2 21 (*)     All other components within normal limits   DRUG SCREEN PANEL, URINE EMERGENCY - Abnormal; Notable for the following components:    THC Presumptive Positive (*)     All other components within normal limits    Narrative:     Specimen Source->Urine   URINALYSIS, REFLEX TO URINE CULTURE - Abnormal; Notable for the following components:    Appearance, UA Hazy (*)     Protein, UA 1+ (*)     Ketones, UA 1+ (*)     Occult Blood UA 3+ (*)     Urobilinogen, UA 2.0-3.0 (*)     All other components within normal limits    Narrative:     Specimen Source->Urine   URINALYSIS MICROSCOPIC - Abnormal; Notable for the following components:    WBC, UA 7 (*)     Hyaline Casts, UA 16 (*)     All other components within normal limits    Narrative:     Specimen Source->Urine   LIPASE   MAGNESIUM   TSH   PREGNANCY TEST, URINE RAPID    Narrative:     Specimen Source->Urine   CK   SARS-COV-2 RNA AMPLIFICATION, QUAL   POCT URINE PREGNANCY          Imaging Results          CT Head Without Contrast (Final result)  Result time 02/04/22 19:10:24    Final result by Christine Lopez MD (02/04/22 19:10:24)                 Narrative:    All CT scans at this facility used dose modulation, iterative reconstruction and/or weight-based dosing when appropriate to reduce radiation doses  as low as  reasonably achievable.    CLINICAL INFORMATION:  Mental status change, unknown cause    FINDINGS:   The ventricles and sulci are normal in size and configuration for age.  There is no intraparenchymal hemorrhage, mass or midline shift.  There are no extra-axial fluid collections.  The paranasal sinus and mastoid air cells are clear.    IMPRESSION:   NO ACUTE INTRACRANIAL PROCESS.    Electronically signed by:  Christine Lopez MD  2/4/2022 7:10 PM [facility] Workstation: [id number]                               Medications   lorazepam injection 0.5 mg (has no administration in time range)   lorazepam injection 0.5 mg (0.5 mg Intravenous Given 2/4/22 5479)     Medical Decision Making:   Clinical Tests:   Lab Tests: Reviewed  Radiological Study: Reviewed  Medical Tests: Reviewed  ED Management:  Patient presents after seizure activity with known history of seizures.  Has had recent change in medications and has missed dosage of seizure medications prior to seizure today.  Seizure was atraumatic.  Secondary to a prolonged postictal state a CT of the head was also performed that is negative.  Patient is now completely back to baseline.  Has a normal mental status exam.  Have advised need for admission to the hospital secondary to prolonged postictal state.  Patient is currently refusing any further care treatment evaluation or admission.  She is awake alert oriented x4, she has no evidence of intoxication.  She is currently demonstrating adequate decision making capabilities.  Risk of leaving against medical advice have been explained in detail including brain damage, chronic pain, permanent disability and death.  Patient voices understanding.  I have urged her to return at any time if she changes her mind regarding our care treatment or evaluation, have urged her to follow up closely outpatient with her neurologist and takes seizure medications as directed.  She has been given Ativan in the emergency room.  Have discussed  importance of following seizure precautions which have been discussed in detail.  The patient is leaving against medical advice.                      Clinical Impression:   Final diagnoses:  [R56.9] Seizure  [Z53.29] Left against medical advice (Primary)          ED Disposition Condition    EMANI Dietz MD  02/04/22 3071

## 2022-02-05 NOTE — ED NOTES
Patient identifiers for Syeda Gonzalez checked and correct.  LOC: Patient is awake, alert, and aware of environment with an appropriate affect. Patient is oriented x 3 and speaking appropriately.States had a seizure today , does not remember incident. Wound noted to R side on   APPEARANCE: Patient resting comfortably and in no acute distress. Patient is clean and well groomed, patient's clothing is properly fastened.  SKIN: The skin is warm and dry. Patient has normal skin turgor and moist mucus membrances. Skin is intact; no bruising or breakdown noted.  MUSKULOSKELETAL: Patient is moving all extremities well, no obvious deformities noted. Pulses intact.   RESPIRATORY: Airway is open and patent. Respirations are spontaneous and non-labored with normal effort and rate.  CARDIAC: Patient has a normal rate and rhythm. No peripheral edema noted. Capillary refill < 3 seconds.  ABDOMEN: No distention noted. Bowel sounds active in all 4 quadrants. Soft and non-tender upon palpation.  NEUROLOGICAL: PERRL. Facial expression is symmetrical. States does not remember seizure, awake and alert.  Allergies reported:   Review of patient's allergies indicates:   Allergen Reactions    Benadryl [diphenhydramine hcl] Other (See Comments)     Lowers seizure threshold.    Quinolones Other (See Comments)     Lowers seizure threshold.     Tramadol Other (See Comments)     seizures

## 2022-02-05 NOTE — DISCHARGE INSTRUCTIONS
You are leaving against medical advice.  You are risking permanent disability, chronic pain, brain damage and death.  Please return at any time if you change your mind regarding our care and recommendations.  Please follow-up closely outpatient with your physician.  Take medications as directed.  Do not miss doses of your seizure medications.  Follow seizure precautions--no driving, no swimming, no operating machinery, no climbing, no tub bathing--showers only in well drained shower  Important to see your neurologist in the next 3 days.

## 2022-02-07 ENCOUNTER — TELEPHONE (OUTPATIENT)
Dept: NEUROLOGY | Facility: CLINIC | Age: 27
End: 2022-02-07
Payer: MEDICAID

## 2022-02-07 NOTE — TELEPHONE ENCOUNTER
Spoke with patients mother and moved 4/14 appointment with Sujata Yuan up to 2/10. Patient went to ED for a seizure.

## 2022-02-07 NOTE — TELEPHONE ENCOUNTER
----- Message from Karena Washington sent at 2/7/2022  3:05 PM CST -----  Contact: pts mom  Type: Needs Medical Advice  Who Called:  pts mom   Best Call Back Number: [phone]    Additional Information: pt had a seizure on Friday, Feb 4th please call to schedule ER f/u appt

## 2022-02-10 ENCOUNTER — OFFICE VISIT (OUTPATIENT)
Dept: NEUROLOGY | Facility: CLINIC | Age: 27
End: 2022-02-10
Payer: MEDICAID

## 2022-02-10 VITALS
TEMPERATURE: 99 F | HEART RATE: 97 BPM | SYSTOLIC BLOOD PRESSURE: 116 MMHG | RESPIRATION RATE: 18 BRPM | WEIGHT: 200.81 LBS | DIASTOLIC BLOOD PRESSURE: 83 MMHG | BODY MASS INDEX: 35.58 KG/M2

## 2022-02-10 DIAGNOSIS — G40.909 SEIZURE DISORDER: Primary | ICD-10-CM

## 2022-02-10 DIAGNOSIS — R41.3 MEMORY CHANGES: ICD-10-CM

## 2022-02-10 PROCEDURE — 3074F PR MOST RECENT SYSTOLIC BLOOD PRESSURE < 130 MM HG: ICD-10-PCS | Mod: CPTII,,, | Performed by: NURSE PRACTITIONER

## 2022-02-10 PROCEDURE — 99215 PR OFFICE/OUTPT VISIT, EST, LEVL V, 40-54 MIN: ICD-10-PCS | Mod: S$PBB,,, | Performed by: NURSE PRACTITIONER

## 2022-02-10 PROCEDURE — 3079F DIAST BP 80-89 MM HG: CPT | Mod: CPTII,,, | Performed by: NURSE PRACTITIONER

## 2022-02-10 PROCEDURE — 99214 OFFICE O/P EST MOD 30 MIN: CPT | Mod: PBBFAC,PO | Performed by: NURSE PRACTITIONER

## 2022-02-10 PROCEDURE — 3074F SYST BP LT 130 MM HG: CPT | Mod: CPTII,,, | Performed by: NURSE PRACTITIONER

## 2022-02-10 PROCEDURE — 1159F PR MEDICATION LIST DOCUMENTED IN MEDICAL RECORD: ICD-10-PCS | Mod: CPTII,,, | Performed by: NURSE PRACTITIONER

## 2022-02-10 PROCEDURE — 3008F PR BODY MASS INDEX (BMI) DOCUMENTED: ICD-10-PCS | Mod: CPTII,,, | Performed by: NURSE PRACTITIONER

## 2022-02-10 PROCEDURE — 1160F PR REVIEW ALL MEDS BY PRESCRIBER/CLIN PHARMACIST DOCUMENTED: ICD-10-PCS | Mod: CPTII,,, | Performed by: NURSE PRACTITIONER

## 2022-02-10 PROCEDURE — 99999 PR PBB SHADOW E&M-EST. PATIENT-LVL IV: ICD-10-PCS | Mod: PBBFAC,,, | Performed by: NURSE PRACTITIONER

## 2022-02-10 PROCEDURE — 99999 PR PBB SHADOW E&M-EST. PATIENT-LVL IV: CPT | Mod: PBBFAC,,, | Performed by: NURSE PRACTITIONER

## 2022-02-10 PROCEDURE — 1160F RVW MEDS BY RX/DR IN RCRD: CPT | Mod: CPTII,,, | Performed by: NURSE PRACTITIONER

## 2022-02-10 PROCEDURE — 99215 OFFICE O/P EST HI 40 MIN: CPT | Mod: S$PBB,,, | Performed by: NURSE PRACTITIONER

## 2022-02-10 PROCEDURE — 3008F BODY MASS INDEX DOCD: CPT | Mod: CPTII,,, | Performed by: NURSE PRACTITIONER

## 2022-02-10 PROCEDURE — 3079F PR MOST RECENT DIASTOLIC BLOOD PRESSURE 80-89 MM HG: ICD-10-PCS | Mod: CPTII,,, | Performed by: NURSE PRACTITIONER

## 2022-02-10 PROCEDURE — 1159F MED LIST DOCD IN RCRD: CPT | Mod: CPTII,,, | Performed by: NURSE PRACTITIONER

## 2022-02-10 RX ORDER — LACOSAMIDE 100 MG/1
TABLET ORAL
Qty: 60 TABLET | Refills: 11 | Status: SHIPPED | OUTPATIENT
Start: 2022-02-10 | End: 2022-05-19 | Stop reason: SDUPTHER

## 2022-02-10 NOTE — ASSESSMENT & PLAN NOTE
reports of memory difficulty more so in the last year or so.   Possibly related to depression/anxiety but cannot fully rule out AED affect.   Referral to NP testing as been made

## 2022-02-10 NOTE — PROGRESS NOTES
"  NEUROLOGY  Outpatient Follow Up    [facility]  [address], [place] [address]  [phone] (office) / [phone] (fax)    Patient Name:  Syeda Gonzalez  :  1995  MR #:  [id number]  Acct #:  [id number]    Date of Neurology Visit: 02/10/2022  Name of Provider: JOSEPH Melton    Other Physicians:  Drake Ruff Jr, MD (Primary Care Physician); No ref. provider found (Referring)      Chief Complaint: Seizures (Last 22)      History of Present Illness (HPI):  Prior HPI from Dr. St ():  "Patient is a RH 21 y.o. female presents with seizure activity. Pt with hx of past seizure precipitated from use of tramadol. On day of admission brought in with seizure activity, had second seizure event in ED, + tongue biting, no loss of bladder. No hx of head trauma, meningitis or febrile seizures. No illicit drug use or excessive alcohol. 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   Spoke with pt's andrzeje; she had two previous seizures, one from tramadol, one while giving blood. Recently has had some stressors, poor sleep. No new medications"      Prior HPI Dr. Ruff  "The patient is a 26 y.o. female referred for evaluation of episodes suspicious for seizures.  She has seen Dr. St.  This is my first time seeing her.      She gives no clear history of aura.  She does report having felt abnormally cold throughout the day leading up to her seizures, though.  Her seizure is characterized by a loss of consciousness and reported convulsive activity.   She did have tongue biting on the sides.  This component of this spell lasted for what sounds to be a matter of minutes and occurred back to back.  Afterwards, she had fatigue/confusion.  The patient had, she believes, 3-4 events on the day of her hospitalization in May.  She did have a seizure about 4 years ago while on Ultram.  Also, she had some episode with loss of consciousness and " "convulsions during a blood draw about 3 years ago.     The patient has a possible family history of seizures ("I heard that my great-great-grandmother had epilepsy."  Also seizures in 2 distant cousins who happen to be on different sides of the family.).  She reports no history of prenatal/ complications. There is no history of febrile seizures.  She notes no history of CNS infections. She claims no history of significant head trauma. There is no history of developmental delay."         Interval history 2022 (Dr. Ruff):  "The patient is a 26 y.o. female seen previously for seizures.  I last saw her in .  She has missed multiple appointments since then.  She states that she is unsure if she has had any seizures since I last saw her.  She has been taking Zonegran 200 mg BID and Vimpat 100 mg BID.  She notes no side effects.  She feels like her memory is problematic.  She states this has been an issue since 2017.  She endorses continued issues with depression.  She denies SI/HI.  She is still seeing her OB/GYN for this.  She has stopped her Prozac.  She has no new complaints otherwise."      Interval Hx 2/10/2022:   Patient is new to me  Patient is here today for seizure follow up. She is accompanied by her mother who helps supply information. Her mother witnessed her seizure last Friday (2022). She suspects the seizure lasted about 6-7 minutes and then called 911. She did bite the right side of her tongue but denies incontinence. Mother states she was convulsing throughout and her eyes rolled back. She was recently transitioned from Zonegran to Keppra by Dr. Ruff in January and reports not understanding how to wean off the Zonegran. She lost the paper that gave her these instructions. She also reports possibly  Having forgotten to take her medications all together. Currenlty she is Zonegran 100 mg BID, Keppra 750 mg BID, and Vimpat 100 mg BID. Her mother is now helping manage her medications. " "        ED report 2/4/2022  "26-year-old female with history of epilepsy presents after a seizure.  Patient is currently being changed from sonogram to Keppra as she was having continued breakthrough seizures on sonogram.  She has also missed multiple doses of her medications recently.  Family members report seizure activity but denied trauma.  Patient postictal while in route with EMS and in the ED.  currently is awake alert and oriented however is answering questions slowly.  She denies any pain or discomfort.  She denies any other problems or complaints."             Treatment to date:    Current AEDs:  Zonegran 100 mg BID (weaning off)  Vimpat 100 mg BID     Prior AEDs:  Lamictal           Past Medical, Surgical, Family & Social History:   Reviewed and updated.    Home Medications:     Current Outpatient Medications:     FLUoxetine 20 MG capsule, Take 20 mg by mouth once daily., Disp: , Rfl: 0    FLUZONE QUAD [id number], PF, 60 mcg (15 mcg x 4)/0.5 mL Syrg, ADM 0.5ML IM UTD, Disp: , Rfl: 0    levETIRAcetam (KEPPRA) 750 MG Tab, Take 1 tablet (750 mg total) by mouth 2 (two) times daily., Disp: 60 tablet, Rfl: 11    NEXPLANON 68 mg Impl subdermal device, , Disp: , Rfl:     zonisamide (ZONEGRAN) 100 MG Cap, TAKE FOUR CAPSULES (400 MG) BY MOUTH ONCE DAILY, Disp: 120 capsule, Rfl: 0    lacosamide (VIMPAT) 100 mg Tab, TAKE ONE TABLET (100 MG) BY MOUTH TWICE DAILY, Disp: 60 tablet, Rfl: 11    ranitidine (ZANTAC) 150 MG tablet, Take 1 tablet (150 mg total) by mouth 2 (two) times daily., Disp: 60 tablet, Rfl: 0    Physical Examination:  /83 (BP Location: Left arm, Patient Position: Sitting, BP Method: Large (Automatic))   Pulse 97   Temp 98.9 °F (37.2 °C) (Temporal)   Resp 18   Wt 91.1 kg (200 lb 13.4 oz)   LMP 02/04/2022 (Exact Date)   BMI 35.58 kg/m²     GENERAL:  General appearance: Well, non-toxic appearing.  No apparent distress.  Neck: supple.    MENTAL STATUS:  Alertness, attention span & " concentration: normal.  Language: normal.  Orientation to self, place & time:  normal.  Memory, recent & remote: limited  Fund of knowledge: normal.      SPEECH:  Clear and fluent.  Follows complex commands.    CRANIAL NERVES:  Cranial Nerves II-XII were examined.  II - Visual fields: normal.  III, IV, VI: PERRL, EOMI, No ptosis, No nystagmus.  V - Facial sensation: normal.  VII - Face symmetry & mobility: Due to Covid-19 recommended precautions, patient wearing facial mask; mouth and nose not examined.  VIII - Hearing: normal.  IX, X - Palate: Due to Covid-19 recommended precautions, patient wearing facial mask; mouth and nose not examined.  XI - Shoulder shrug: normal.  XII - Tongue protrusion: Due to Covid-19 recommended precautions, patient wearing facial mask; mouth and nose not examined.    GROSS MOTOR:  Gait & station: non focal  Tone: normal.  Abnormal movements: none.  Finger-nose : normal.  Rapid alternating movements: normal.  Pronator drift: normal    MUSCLE STRENGTH:     Fascics Atrophy RIGHT    LEFT Atrophy Fascics     5 Biceps 5       5 Triceps 5       5 Forearm.Pr. 5                5 Iliopsoas flex    5       5 Hip Abduct 5       5 Hip Adduct 5       5 Quads 5       5 Hams 5       5 Dorsiflex 5       5 Plantar Flex 5       REFLEXES:    RIGHT Reflex   LEFT   2+ Biceps 2+   2+ Brachiorad. 2+        2+ Patellar 2+     SENSORY:  Light touch: Normal throughout.           Diagnostic Data Reviewed:   Component      Latest Ref Rng & Units 2/4/2022 1/18/2022   WBC      3.90 - 12.70 K/uL  7.74   RBC      4.00 - 5.40 M/uL  4.56   Hemoglobin      12.0 - 16.0 g/dL  13.2   Hematocrit      37.0 - 48.5 %  41.5   MCV      82 - 98 fL  91   MCH      27.0 - 31.0 pg  28.9   MCHC      32.0 - 36.0 g/dL  31.8 (L)   RDW      11.5 - 14.5 %  12.2   Platelets      150 - 450 K/uL  437   MPV      9.2 - 12.9 fL  10.8   Immature Granulocytes      0.0 - 0.5 %  0.4   Gran # (ANC)      1.8 - 7.7 K/uL  5.1   Immature Grans (Abs)       0.00 - 0.04 K/uL  0.03   Lymph #      1.0 - 4.8 K/uL  1.8   Mono #      0.3 - 1.0 K/uL  0.7   Eos #      0.0 - 0.5 K/uL  0.1   Baso #      0.00 - 0.20 K/uL  0.06   nRBC      0 /100 WBC  0   Gran %      38.0 - 73.0 %  65.6   Lymph %      18.0 - 48.0 %  23.8   Mono %      4.0 - 15.0 %  8.5   Eosinophil %      0.0 - 8.0 %  0.9   Basophil %      0.0 - 1.9 %  0.8   Differential Method        Automated   Sodium      136 - 145 mmol/L  139   Potassium      3.5 - 5.1 mmol/L  4.2   Chloride      95 - 110 mmol/L  110   CO2      23 - 29 mmol/L  19 (L)   Glucose      70 - 110 mg/dL  95   BUN      6 - 20 mg/dL  12   Creatinine      0.5 - 1.4 mg/dL  0.7   Calcium      8.7 - 10.5 mg/dL  9.4   PROTEIN TOTAL      6.0 - 8.4 g/dL  7.6   Albumin      3.5 - 5.2 g/dL  3.8   BILIRUBIN TOTAL      0.1 - 1.0 mg/dL  0.3   Alkaline Phosphatase      55 - 135 U/L  99   AST      10 - 40 U/L  16   ALT      10 - 44 U/L  15   Anion Gap      8 - 16 mmol/L  10   eGFR if African American      >60 mL/min/1.73 m:2  >60.0   eGFR if non African American      >60 mL/min/1.73 m:2  >60.0   Specimen UA       Urine, Clean Catch    Color, UA      Yellow, Straw, Sylwia Yellow    Appearance, UA      Clear Hazy (A)    pH, UA      5.0 - 8.0 7.0    Specific Gravity, UA      1.005 - 1.030 1.030    Protein, UA      Negative 1+ (A)    Glucose, UA      Negative Negative    Ketones, UA      Negative 1+ (A)    Bilirubin (UA)      Negative Negative    Occult Blood UA      Negative 3+ (A)    NITRITE UA      Negative Negative    UROBILINOGEN UA      Negative EU/dL 2.0-3.0 (A)    Leukocytes, UA      Negative Negative    Benzodiazepines      Negative Negative    Cocaine (Metab.)      Negative Negative    Opiate Scrn, Ur      Negative Negative    Barbiturate Screen, Ur      Negative Negative    Amphetamine Screen, Ur      Negative Negative    Marijuana (THC) Metabolite      Negative Presumptive Positive (A)    Phencyclidine      Negative Negative    Creatinine, Urine      15.0 -  "325.0 mg/dL 177.0    Toxicology Information       SEE COMMENT    Magnesium      1.6 - 2.6 mg/dL 2.1    TSH      0.340 - 5.600 uIU/mL 1.420    CPK      20 - 180 U/L 73        MRI brain (5/16):  "Normal MRI of the brain with and without gadolinium"     EEG (5/16):  "IMPRESSION: This is an abnormal EEG during wakefulness, drowsiness and sleep. Independent left and right temporal focal slowing was noted. In addition, independent left and right temporal focal sharp waves were noted.  CLINICAL CORRELATION: The patient is a 21-year-old female with a history of seizures who is currently maintained on Keppra. This is an abnormal EEG during wakefulness, drowsiness and sleep. The presence of independent left and right temporal focal slowing is suggestive of focal neuronal dysfunction in these regions. The presence of independent left and right temporal sharp waves confers an increased risk of focal seizures from both temporal lobes. During this study, no seizures were recorded."     DEXA (9/17):  Normal    CT head 2/4/2022:  FINDINGS:   The ventricles and sulci are normal in size and configuration for age.  There is no intraparenchymal hemorrhage, mass or midline shift.  There are no extra-axial fluid collections.  The paranasal sinus and mastoid air cells are clear.     IMPRESSION:   NO ACUTE INTRACRANIAL PROCESS.              Assessment and Plan:    Problem List Items Addressed This Visit        Neuro    Seizure disorder - Primary    Current Assessment & Plan     Likely represents partial onset seizures   -  a temporal lobe focus is likely based on EEG; Etiology is unclear.   Pt was to transition from Zonegran to Keppra due to memory concerns.   Last known seizure 2/4/2022  Pt reports not remembering if she took her medications and was unsure how to wean off the Zonegran all together.    - this was likely the cause of her breakthrough seizure.   She is to continue Keppra 750 mg BID, Vimpat 100 mg BID and slowly wean off the " Zonegran. I have personally given  the patient and mother instructions on how to do so    Should she have further seizures her Vimpat/Keppra could be increased.  If she should then have seizures on that regimen, I would advocate for an aggressive surgical evaluation, including inpatient vEEG monitoring, PET scan, neuropsychological evaluation, etc.    Medication side effects were discussed with the patient.  Patient was educated on Medication compliance.   State law as it pertains to driving for individuals with seizures has been discussed.  She is not to drive until seizure free for 6 months.  The patient has also been counseled on seizure safety.  Teratogenicity of AEDs were discussed.  She was instructed to make sure she was utilizing a reliable means of birth control.  Should she decide to become pregnant, she is to contact us first, so we can determine what medication adjustments, if any, are appropriate.  The patient was counseled that the risks posed by uncontrolled seizures typically exceeds that posed by the medications themselves. Consequently, the patient was advised of the importance of continuing her anticonvulsant medication should she become pregnant.  She was advised to take supplemental folate at 1 mg daily.  Should she desire to become pregnant or actually become pregnant, we will plan on increasing it to 4 mg daily.  We also discussed lactation issues with AEDs.  I also discussed with the patient the need for a PCP.             Memory changes    Current Assessment & Plan     reports of memory difficulty more so in the last year or so.   Possibly related to depression/anxiety but cannot fully rule out AED affect.   Referral to NP testing as been made                                 Important to note, also  has a past medical history of Asthma, Depression affecting pregnancy, IUD (intrauterine device) in place, Mental disorder, and Seizures.          The patient will return to clinic in 6-8 weeks  with Dr. Ruff.    All questions were answered and patient is comfortable with the plan.         Thank you very much for the opportunity to assist in this patient's care.    If you have any questions or concerns, please do not hesitate to contact me at any time.      Sincerely,     JOSEPH Melton  [facility] [place]         I spent a total of 58 minutes on the day of the visit.This includes face to face time and non-face to face time preparing to see the patient (eg, review of tests), Obtaining and/or reviewing separately obtained history, Documenting clinical information in the electronic or other health record, Independently interpreting resultsand communicating results to the patient/family/caregiver, or Care coordination.

## 2022-02-10 NOTE — ASSESSMENT & PLAN NOTE
Likely represents partial onset seizures   -  a temporal lobe focus is likely based on EEG; Etiology is unclear.   Pt was to transition from Zonegran to Keppra due to memory concerns.   Last known seizure 2/4/2022  Pt reports not remembering if she took her medications and was unsure how to wean off the Zonegran all together.    - this was likely the cause of her breakthrough seizure.   She is to continue Keppra 750 mg BID, Vimpat 100 mg BID and slowly wean off the Zonegran. I have personally given  the patient and mother instructions on how to do so    Should she have further seizures her Vimpat/Keppra could be increased.  If she should then have seizures on that regimen, I would advocate for an aggressive surgical evaluation, including inpatient vEEG monitoring, PET scan, neuropsychological evaluation, etc.    Medication side effects were discussed with the patient.  Patient was educated on Medication compliance.   State law as it pertains to driving for individuals with seizures has been discussed.  She is not to drive until seizure free for 6 months.  The patient has also been counseled on seizure safety.  Teratogenicity of AEDs were discussed.  She was instructed to make sure she was utilizing a reliable means of birth control.  Should she decide to become pregnant, she is to contact us first, so we can determine what medication adjustments, if any, are appropriate.  The patient was counseled that the risks posed by uncontrolled seizures typically exceeds that posed by the medications themselves. Consequently, the patient was advised of the importance of continuing her anticonvulsant medication should she become pregnant.  She was advised to take supplemental folate at 1 mg daily.  Should she desire to become pregnant or actually become pregnant, we will plan on increasing it to 4 mg daily.  We also discussed lactation issues with AEDs.  I also discussed with the patient the need for a PCP.

## 2022-02-10 NOTE — PATIENT INSTRUCTIONS
We angie continue transitioning off your Zonegran  Continue taking Zonegran 100 mg twice a day for the remainder to the week  Monday 2/14/2022 decrease to 100 mg once a day and do this for 3 days.   On Thursday 2/17/2022 take 100 mg capsule overy other day for 2 days then stop.     In the meantime please continue Keppra 750 mg twice a day as well as Vimpat 100 mg twice a day.     Please let Dr. Ruff or myself know about future seizures.   For now we will proceed with Neuropsych testing for memory concerns.

## 2022-03-21 ENCOUNTER — HOSPITAL ENCOUNTER (EMERGENCY)
Facility: HOSPITAL | Age: 27
Discharge: HOME | End: 2022-03-21
Attending: EMERGENCY MEDICINE
Payer: MEDICAID

## 2022-03-21 VITALS
SYSTOLIC BLOOD PRESSURE: 129 MMHG | WEIGHT: 200 LBS | OXYGEN SATURATION: 99 % | BODY MASS INDEX: 35.43 KG/M2 | RESPIRATION RATE: 20 BRPM | TEMPERATURE: 99 F | DIASTOLIC BLOOD PRESSURE: 81 MMHG | HEART RATE: 67 BPM

## 2022-03-21 DIAGNOSIS — R56.9 SEIZURE: Primary | ICD-10-CM

## 2022-03-21 LAB
ALBUMIN SERPL BCP-MCNC: 3.8 G/DL (ref 3.5–5.2)
ALP SERPL-CCNC: 72 U/L (ref 55–135)
ALT SERPL W/O P-5'-P-CCNC: 21 U/L (ref 10–44)
ANION GAP SERPL CALC-SCNC: 9 MMOL/L (ref 8–16)
AST SERPL-CCNC: 19 U/L (ref 10–40)
B-HCG UR QL: NEGATIVE
BASOPHILS # BLD AUTO: 0.06 K/UL (ref 0–0.2)
BASOPHILS NFR BLD: 0.8 % (ref 0–1.9)
BILIRUB SERPL-MCNC: 0.4 MG/DL (ref 0.1–1)
BUN SERPL-MCNC: 13 MG/DL (ref 6–20)
CALCIUM SERPL-MCNC: 8.4 MG/DL (ref 8.7–10.5)
CHLORIDE SERPL-SCNC: 106 MMOL/L (ref 95–110)
CO2 SERPL-SCNC: 23 MMOL/L (ref 23–29)
CREAT SERPL-MCNC: 0.6 MG/DL (ref 0.5–1.4)
CTP QC/QA: YES
DIFFERENTIAL METHOD: ABNORMAL
EOSINOPHIL # BLD AUTO: 0.1 K/UL (ref 0–0.5)
EOSINOPHIL NFR BLD: 1.5 % (ref 0–8)
ERYTHROCYTE [DISTWIDTH] IN BLOOD BY AUTOMATED COUNT: 12.6 % (ref 11.5–14.5)
EST. GFR  (AFRICAN AMERICAN): >60 ML/MIN/1.73 M^2
EST. GFR  (NON AFRICAN AMERICAN): >60 ML/MIN/1.73 M^2
GLUCOSE SERPL-MCNC: 90 MG/DL (ref 70–110)
HCT VFR BLD AUTO: 39 % (ref 37–48.5)
HGB BLD-MCNC: 12.4 G/DL (ref 12–16)
IMM GRANULOCYTES # BLD AUTO: 0.04 K/UL (ref 0–0.04)
IMM GRANULOCYTES NFR BLD AUTO: 0.5 % (ref 0–0.5)
LYMPHOCYTES # BLD AUTO: 1.7 K/UL (ref 1–4.8)
LYMPHOCYTES NFR BLD: 22.7 % (ref 18–48)
MAGNESIUM SERPL-MCNC: 1.8 MG/DL (ref 1.6–2.6)
MCH RBC QN AUTO: 27.9 PG (ref 27–31)
MCHC RBC AUTO-ENTMCNC: 31.8 G/DL (ref 32–36)
MCV RBC AUTO: 88 FL (ref 82–98)
MONOCYTES # BLD AUTO: 0.6 K/UL (ref 0.3–1)
MONOCYTES NFR BLD: 8.5 % (ref 4–15)
NEUTROPHILS # BLD AUTO: 4.9 K/UL (ref 1.8–7.7)
NEUTROPHILS NFR BLD: 66 % (ref 38–73)
NRBC BLD-RTO: 0 /100 WBC
PLATELET # BLD AUTO: 365 K/UL (ref 150–450)
PMV BLD AUTO: 10.2 FL (ref 9.2–12.9)
POTASSIUM SERPL-SCNC: 3.8 MMOL/L (ref 3.5–5.1)
PROT SERPL-MCNC: 7.3 G/DL (ref 6–8.4)
RBC # BLD AUTO: 4.45 M/UL (ref 4–5.4)
SODIUM SERPL-SCNC: 138 MMOL/L (ref 136–145)
WBC # BLD AUTO: 7.49 K/UL (ref 3.9–12.7)

## 2022-03-21 PROCEDURE — 81025 URINE PREGNANCY TEST: CPT | Performed by: EMERGENCY MEDICINE

## 2022-03-21 PROCEDURE — 83735 ASSAY OF MAGNESIUM: CPT | Performed by: EMERGENCY MEDICINE

## 2022-03-21 PROCEDURE — 99284 EMERGENCY DEPT VISIT MOD MDM: CPT | Mod: 25

## 2022-03-21 PROCEDURE — 96375 TX/PRO/DX INJ NEW DRUG ADDON: CPT

## 2022-03-21 PROCEDURE — 85025 COMPLETE CBC W/AUTO DIFF WBC: CPT | Performed by: EMERGENCY MEDICINE

## 2022-03-21 PROCEDURE — 80053 COMPREHEN METABOLIC PANEL: CPT | Performed by: EMERGENCY MEDICINE

## 2022-03-21 PROCEDURE — 63600175 PHARM REV CODE 636 W HCPCS: Performed by: EMERGENCY MEDICINE

## 2022-03-21 PROCEDURE — 96365 THER/PROPH/DIAG IV INF INIT: CPT

## 2022-03-21 RX ORDER — ONDANSETRON 2 MG/ML
4 INJECTION INTRAMUSCULAR; INTRAVENOUS
Status: COMPLETED | OUTPATIENT
Start: 2022-03-21 | End: 2022-03-21

## 2022-03-21 RX ORDER — LEVETIRACETAM 10 MG/ML
1000 INJECTION INTRAVASCULAR
Status: COMPLETED | OUTPATIENT
Start: 2022-03-21 | End: 2022-03-21

## 2022-03-21 RX ADMIN — LEVETIRACETAM INJECTION 1000 MG: 10 INJECTION INTRAVENOUS at 02:03

## 2022-03-21 RX ADMIN — ONDANSETRON 4 MG: 2 INJECTION INTRAMUSCULAR; INTRAVENOUS at 12:03

## 2022-03-21 NOTE — ED PROVIDER NOTES
Encounter Date: 3/21/2022       History     Chief Complaint   Patient presents with    Seizures     Pt postictal, did not take seizure med last night but took it this morning. C/o nausea     26-year-old female with history of epilepsy presents after a seizure.  Patient states currently taking Vimpat and Keppra.  Patient states that she had her nightly dose of the medications last night.  While riding in the car patient had a generalized tonic-clonic seizure.  Currently at this time patient denies recent illness, no fever, no headache.  Patient states did stay up late last night and had lack of sleep as well secondary to stress.  Patient currently GCS 15 at baseline.  Denies recent illness.        Review of patient's allergies indicates:   Allergen Reactions    Benadryl [diphenhydramine hcl] Other (See Comments)     Lowers seizure threshold.    Quinolones Other (See Comments)     Lowers seizure threshold.     Tramadol Other (See Comments)     seizures     Past Medical History:   Diagnosis Date    Asthma     Depression affecting pregnancy     IUD (intrauterine device) in place     placed today 09/10/18    Mental disorder     Seizures     Epilepsy     Past Surgical History:   Procedure Laterality Date    ADENOIDECTOMY W/ MYRINGOTOMY AND TUBES      CHOLECYSTECTOMY       Family History   Problem Relation Age of Onset    Heart disease Father     Heart disease Maternal Grandfather     Heart disease Paternal Grandmother     Heart disease Paternal Grandfather     Congenital heart disease Neg Hx     Cardiomyopathy Neg Hx     Arrhythmia Neg Hx     Hypertension Neg Hx     Pacemaker/defibrilator Neg Hx      Social History     Tobacco Use    Smoking status: Never Smoker    Smokeless tobacco: Never Used   Substance Use Topics    Alcohol use: Yes     Comment: occasionally    Drug use: No     Review of Systems   Constitutional: Negative for fever.   HENT: Negative for sore throat.    Respiratory: Negative for  shortness of breath.    Cardiovascular: Negative for chest pain.   Gastrointestinal: Negative for nausea and vomiting.   Genitourinary: Negative for dysuria.   Musculoskeletal: Negative for back pain.   Skin: Negative for rash.   Neurological: Positive for seizures. Negative for weakness.   Hematological: Does not bruise/bleed easily.   Psychiatric/Behavioral: Negative for suicidal ideas.       Physical Exam     Initial Vitals [03/21/22 1112]   BP Pulse Resp Temp SpO2   137/77 103 19 98.7 °F (37.1 °C) 99 %      MAP       --         Physical Exam    Nursing note and vitals reviewed.  Constitutional: She appears well-developed and well-nourished.   HENT:   Head: Normocephalic and atraumatic.   Nose: Nose normal.   Mouth/Throat: Oropharynx is clear and moist.   Eyes: Conjunctivae and EOM are normal. Pupils are equal, round, and reactive to light.   Neck: Neck supple. No thyromegaly present. No tracheal deviation present.   Normal range of motion.  Cardiovascular: Normal rate, regular rhythm, normal heart sounds and intact distal pulses. Exam reveals no gallop and no friction rub.    No murmur heard.  Pulmonary/Chest: Breath sounds normal. No stridor. No respiratory distress.   Abdominal: Abdomen is soft. Bowel sounds are normal. She exhibits no mass. There is no rebound and no guarding.   Musculoskeletal:         General: No edema. Normal range of motion.      Cervical back: Normal range of motion and neck supple.     Lymphadenopathy:     She has no cervical adenopathy.   Neurological: She is alert and oriented to person, place, and time. She has normal strength and normal reflexes. She displays normal reflexes. No cranial nerve deficit or sensory deficit. GCS score is 15. GCS eye subscore is 4. GCS verbal subscore is 5. GCS motor subscore is 6.   Skin: Skin is warm and dry. Capillary refill takes less than 2 seconds.   Psychiatric: She has a normal mood and affect.         ED Course   Procedures  Labs Reviewed    COMPREHENSIVE METABOLIC PANEL - Abnormal; Notable for the following components:       Result Value    Calcium 8.4 (*)     All other components within normal limits   CBC W/ AUTO DIFFERENTIAL - Abnormal; Notable for the following components:    MCHC 31.8 (*)     All other components within normal limits   MAGNESIUM   POCT URINE PREGNANCY          Imaging Results    None          Medications   ondansetron injection 4 mg (4 mg Intravenous Given 3/21/22 1248)   levETIRAcetam in NaCl (iso-os) IVPB 1,000 mg (0 mg Intravenous Stopped 3/21/22 1455)     Medical Decision Making:   Initial Assessment:   26-year-old female with history of epilepsy presents after a seizure.  Patient states currently taking Vimpat and Keppra.  Patient states that she had her nightly dose of the medications last night.  While riding in the car patient had a generalized tonic-clonic seizure.  Currently at this time patient denies recent illness, no fever, no headache.  Patient states did stay up late last night and had lack of sleep as well secondary to stress.  Patient currently GCS 15 at baseline.  Denies recent illness.        Differential Diagnosis:   Seizure, electrolyte abnormality, medication noncompliance  Clinical Tests:   Lab Tests: Ordered and Reviewed  Radiological Study: Ordered and Reviewed  ED Management:  Patient seen evaluated emergency department.  Currently at this time patient with GCS 15. Patient was given dose of Keppra 1000 mg IV piggyback x1.  Currently patient is to continue with seizure precautions.  Take the medications as previously prescribed.  She is to follow up with her neurologist this week.  She is return to the emergency department problems persist worsens or additional concerns.                        Clinical Impression:   Final diagnoses:  [R56.9] Seizure (Primary)          ED Disposition Condition    Discharge Stable        ED Prescriptions     None        Follow-up Information     Follow up With Specialties  Details Why Contact Info    Drake Ruff Jr., MD Neurology Schedule an appointment as soon as possible for a visit in 2 days For recheck/continuing care [address]  [address]  [facility] [address]  [phone]             Alejandro Sousa MD  03/21/22 6237

## 2022-04-12 ENCOUNTER — HOSPITAL ENCOUNTER (EMERGENCY)
Facility: HOSPITAL | Age: 27
Discharge: HOME | End: 2022-04-12
Attending: EMERGENCY MEDICINE
Payer: MEDICAID

## 2022-04-12 VITALS
WEIGHT: 200 LBS | BODY MASS INDEX: 35.44 KG/M2 | HEART RATE: 108 BPM | HEIGHT: 63 IN | RESPIRATION RATE: 18 BRPM | OXYGEN SATURATION: 99 % | DIASTOLIC BLOOD PRESSURE: 81 MMHG | TEMPERATURE: 98 F | SYSTOLIC BLOOD PRESSURE: 118 MMHG

## 2022-04-12 DIAGNOSIS — R56.9 SEIZURE: Primary | ICD-10-CM

## 2022-04-12 LAB
ALBUMIN SERPL BCP-MCNC: 3.9 G/DL (ref 3.5–5.2)
ALP SERPL-CCNC: 78 U/L (ref 55–135)
ALT SERPL W/O P-5'-P-CCNC: 15 U/L (ref 10–44)
ANION GAP SERPL CALC-SCNC: 12 MMOL/L (ref 8–16)
AST SERPL-CCNC: 28 U/L (ref 10–40)
BASOPHILS # BLD AUTO: 0.06 K/UL (ref 0–0.2)
BASOPHILS NFR BLD: 0.5 % (ref 0–1.9)
BILIRUB SERPL-MCNC: 0.4 MG/DL (ref 0.1–1)
BUN SERPL-MCNC: 11 MG/DL (ref 6–20)
CALCIUM SERPL-MCNC: 8.4 MG/DL (ref 8.7–10.5)
CHLORIDE SERPL-SCNC: 108 MMOL/L (ref 95–110)
CO2 SERPL-SCNC: 20 MMOL/L (ref 23–29)
CREAT SERPL-MCNC: 0.7 MG/DL (ref 0.5–1.4)
DIFFERENTIAL METHOD: ABNORMAL
EOSINOPHIL # BLD AUTO: 0.1 K/UL (ref 0–0.5)
EOSINOPHIL NFR BLD: 0.4 % (ref 0–8)
ERYTHROCYTE [DISTWIDTH] IN BLOOD BY AUTOMATED COUNT: 13 % (ref 11.5–14.5)
EST. GFR  (AFRICAN AMERICAN): >60 ML/MIN/1.73 M^2
EST. GFR  (NON AFRICAN AMERICAN): >60 ML/MIN/1.73 M^2
GLUCOSE SERPL-MCNC: 99 MG/DL (ref 70–110)
HCT VFR BLD AUTO: 40 % (ref 37–48.5)
HGB BLD-MCNC: 12.8 G/DL (ref 12–16)
IMM GRANULOCYTES # BLD AUTO: 0.07 K/UL (ref 0–0.04)
IMM GRANULOCYTES NFR BLD AUTO: 0.6 % (ref 0–0.5)
LYMPHOCYTES # BLD AUTO: 1.6 K/UL (ref 1–4.8)
LYMPHOCYTES NFR BLD: 12.8 % (ref 18–48)
MCH RBC QN AUTO: 27.9 PG (ref 27–31)
MCHC RBC AUTO-ENTMCNC: 32 G/DL (ref 32–36)
MCV RBC AUTO: 87 FL (ref 82–98)
MONOCYTES # BLD AUTO: 0.9 K/UL (ref 0.3–1)
MONOCYTES NFR BLD: 7.3 % (ref 4–15)
NEUTROPHILS # BLD AUTO: 10 K/UL (ref 1.8–7.7)
NEUTROPHILS NFR BLD: 78.4 % (ref 38–73)
NRBC BLD-RTO: 0 /100 WBC
PLATELET # BLD AUTO: 324 K/UL (ref 150–450)
PMV BLD AUTO: 10.3 FL (ref 9.2–12.9)
POTASSIUM SERPL-SCNC: 3.9 MMOL/L (ref 3.5–5.1)
PROT SERPL-MCNC: 7.4 G/DL (ref 6–8.4)
RBC # BLD AUTO: 4.58 M/UL (ref 4–5.4)
SODIUM SERPL-SCNC: 140 MMOL/L (ref 136–145)
WBC # BLD AUTO: 12.68 K/UL (ref 3.9–12.7)

## 2022-04-12 PROCEDURE — 63600175 PHARM REV CODE 636 W HCPCS: Performed by: EMERGENCY MEDICINE

## 2022-04-12 PROCEDURE — 85025 COMPLETE CBC W/AUTO DIFF WBC: CPT | Performed by: EMERGENCY MEDICINE

## 2022-04-12 PROCEDURE — 99291 CRITICAL CARE FIRST HOUR: CPT

## 2022-04-12 PROCEDURE — 96361 HYDRATE IV INFUSION ADD-ON: CPT

## 2022-04-12 PROCEDURE — 25000003 PHARM REV CODE 250: Performed by: EMERGENCY MEDICINE

## 2022-04-12 PROCEDURE — 96372 THER/PROPH/DIAG INJ SC/IM: CPT | Mod: 59

## 2022-04-12 PROCEDURE — 63600175 PHARM REV CODE 636 W HCPCS

## 2022-04-12 PROCEDURE — 80053 COMPREHEN METABOLIC PANEL: CPT | Performed by: EMERGENCY MEDICINE

## 2022-04-12 PROCEDURE — 96365 THER/PROPH/DIAG IV INF INIT: CPT

## 2022-04-12 RX ORDER — LEVETIRACETAM 10 MG/ML
1000 INJECTION INTRAVASCULAR
Status: COMPLETED | OUTPATIENT
Start: 2022-04-12 | End: 2022-04-12

## 2022-04-12 RX ORDER — ESTRADIOL 2 MG/1
2 TABLET ORAL DAILY
COMMUNITY

## 2022-04-12 RX ORDER — LEVETIRACETAM 1000 MG/1
1000 TABLET ORAL 2 TIMES DAILY
Qty: 60 TABLET | Refills: 11 | Status: ON HOLD | OUTPATIENT
Start: 2022-04-12 | End: 2022-11-15 | Stop reason: SDUPTHER

## 2022-04-12 RX ORDER — ACETAMINOPHEN 325 MG/1
650 TABLET ORAL
Status: COMPLETED | OUTPATIENT
Start: 2022-04-12 | End: 2022-04-12

## 2022-04-12 RX ORDER — SODIUM CHLORIDE 9 MG/ML
1000 INJECTION, SOLUTION INTRAVENOUS
Status: COMPLETED | OUTPATIENT
Start: 2022-04-12 | End: 2022-04-12

## 2022-04-12 RX ORDER — LORAZEPAM 2 MG/ML
INJECTION INTRAMUSCULAR
Status: COMPLETED
Start: 2022-04-12 | End: 2022-04-12

## 2022-04-12 RX ADMIN — ACETAMINOPHEN 650 MG: 325 TABLET, FILM COATED ORAL at 05:04

## 2022-04-12 RX ADMIN — LEVETIRACETAM INJECTION 1000 MG: 10 INJECTION INTRAVENOUS at 04:04

## 2022-04-12 RX ADMIN — SODIUM CHLORIDE 1000 ML: 0.9 INJECTION, SOLUTION INTRAVENOUS at 04:04

## 2022-04-12 RX ADMIN — LORAZEPAM 2 MG: 2 INJECTION INTRAMUSCULAR; INTRAVENOUS at 03:04
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
## 2022-04-12 NOTE — ED NOTES
Witnessed clonic seizure Dr. Rogle at bedside. Pt placed on non rebreather. Suction at bedside. Soft pads placed on stretcher.

## 2022-04-12 NOTE — ED PROVIDER NOTES
Encounter Date: 4/12/2022       History     Chief Complaint   Patient presents with    Seizures     Had a seizure at home - does not remember - called mother after she woke up      Patient presents after seizure at home.  Patient has a history of seizures.  Patient is on Vimpat and Keppra.  Patient had a tonic-clonic seizure at home that was witnessed by her children.  Patient was brought in by EMS.  Upon arrival patient is an awake alert in no acute distress.  While in the emergency department for just a few minutes patient then had another tonic-clonic seizure that was witnessed by myself.  Seizure was controlled with IM Ativan.  Patient then received an IV and full workup.  Patient admits to missing her Keppra dose this morning.  Patient sees .        Review of patient's allergies indicates:   Allergen Reactions    Benadryl [diphenhydramine hcl] Other (See Comments)     Lowers seizure threshold.    Quinolones Other (See Comments)     Lowers seizure threshold.     Tramadol Other (See Comments)     seizures     Past Medical History:   Diagnosis Date    Asthma     Depression affecting pregnancy     IUD (intrauterine device) in place     placed today 09/10/18    Mental disorder     Seizures     Epilepsy     Past Surgical History:   Procedure Laterality Date    ADENOIDECTOMY W/ MYRINGOTOMY AND TUBES      CHOLECYSTECTOMY       Family History   Problem Relation Age of Onset    Heart disease Father     Heart disease Maternal Grandfather     Heart disease Paternal Grandmother     Heart disease Paternal Grandfather     Congenital heart disease Neg Hx     Cardiomyopathy Neg Hx     Arrhythmia Neg Hx     Hypertension Neg Hx     Pacemaker/defibrilator Neg Hx      Social History     Tobacco Use    Smoking status: Never Smoker    Smokeless tobacco: Never Used   Substance Use Topics    Alcohol use: Yes     Comment: occasionally    Drug use: No     Review of Systems   All other systems reviewed and  are negative.      Physical Exam     Initial Vitals [04/12/22 1506]   BP Pulse Resp Temp SpO2   124/80 103 20 98.3 °F (36.8 °C) 100 %      MAP       --         Physical Exam    Nursing note and vitals reviewed.  Constitutional: She appears well-developed and well-nourished.   Pleasant, polite   HENT:   Head: Normocephalic and atraumatic.   Eyes: EOM are normal.   Neck: Neck supple.   Normal range of motion.  Cardiovascular: Normal rate, regular rhythm, normal heart sounds and intact distal pulses.   Pulmonary/Chest: Breath sounds normal. No respiratory distress.   Abdominal: Abdomen is soft.   Musculoskeletal:         General: Normal range of motion.      Cervical back: Normal range of motion and neck supple.     Neurological: She is alert and oriented to person, place, and time. She has normal strength.   Skin: Skin is warm and dry. Capillary refill takes less than 2 seconds.   Psychiatric: She has a normal mood and affect. Her behavior is normal. Judgment and thought content normal.         ED Course   Procedures  Labs Reviewed   CBC W/ AUTO DIFFERENTIAL - Abnormal; Notable for the following components:       Result Value    Immature Granulocytes 0.6 (*)     Gran # (ANC) 10.0 (*)     Immature Grans (Abs) 0.07 (*)     Gran % 78.4 (*)     Lymph % 12.8 (*)     All other components within normal limits   COMPREHENSIVE METABOLIC PANEL - Abnormal; Notable for the following components:    CO2 20 (*)     Calcium 8.4 (*)     All other components within normal limits          Imaging Results          CT Head Without Contrast (Final result)  Result time 04/12/22 16:37:40    Final result by Ken Tejada MD (04/12/22 16:37:40)                 Narrative:    CMS MANDATED QUALITY DATA - CT RADIATION - 436    All CT scans at this facility utilize dose modulation, iterative reconstruction, and/or weight based dosing when appropriate to reduce radiation dose to as low as reasonably achievable.          Reason: Seizure, new-onset,  no history of trauma seizure    TECHNIQUE: Head CT without IV contrast.    COMPARISON: 2/4/2022    FINDINGS:  Gray-white differentiation is maintained without hemorrhage, midline shift, or mass effect.    The ventricles and cisterns are maintained.    Calvarium is intact. Visualized sinuses are clear.    IMPRESSION:  Normal noncontrast head CT.    Electronically signed by:  Ken Tejada MD  4/12/2022 4:37 PM CDT Workstation: [id number]                               Medications   levETIRAcetam in NaCl (iso-os) IVPB 1,000 mg (0 mg Intravenous Stopped 4/12/22 1635)   lorazepam (ATIVAN) 2 mg/mL injection (2 mg Intramuscular Given 4/12/22 1545)   0.9%  NaCl infusion (1,000 mLs Intravenous New Bag 4/12/22 1606)   acetaminophen tablet 650 mg (650 mg Oral Given 4/12/22 1730)     Medical Decision Making:   Initial Assessment:   No apparent distress  Differential Diagnosis:   Seizure, electrolyte abnormalities  Clinical Tests:   Lab Tests: Reviewed and Ordered  Radiological Study: Ordered and Reviewed  Medical Tests: Reviewed and Ordered  ED Management:  In the emergency department patient had a witnessed seizure that was controlled with Ativan.  Received Keppra 1000 mg loading dose in the emergency department.  She was consulted to Dr. Ruff's partner who was in agreement of increasing Keppra to 1000 mg twice a day.  Patient was advised of this plan.  She is currently awake and alert with no further seizure-like activity.  She will be going home with her mother.  Mother will be staying with her tonight.  They will call Dr. Ruff in the morning.  Patient be discharged stable condition.  Detailed return precautions discussed.    Attending Critical Care:   Critical Care Times:   Direct Patient Care (initial evaluation, reassessments, and time considering the case)................................................................10 minutes.   Additional History from reviewing old medical records or taking additional history from  the family, EMS, PCP, etc.......................10 minutes.   Ordering, Reviewing, and Interpreting Diagnostic Studies...............................................................................................................10 minutes.   Documentation..................................................................................................................................................................................5 minutes.   ==============================================================  · Total Critical Care Time - exclusive of procedural time: 35 minutes.  ==============================================================  Critical care was necessary to treat or prevent imminent or life-threatening deterioration of the following conditions:  Seizure.   Critical care was time spent personally by me on the following activities: obtaining history from patient or relative, examination of patient, review of x-rays / CT sent with the patient, ordering lab, x-rays, and/or EKG, development of treatment plan with patient or relative, ordering and performing treatments and interventions, interpretation of cardiac measurements and re-evaluation of patient's condition.   Critical Care Condition: potentially life-threatening                         Clinical Impression:   Final diagnoses:  [R56.9] Seizure (Primary)          ED Disposition Condition    Discharge Stable        ED Prescriptions     Medication Sig Dispense Start Date End Date Auth. Provider    levETIRAcetam (KEPPRA) 1000 MG tablet Take 1 tablet (1,000 mg total) by mouth 2 (two) times daily. 60 tablet 4/12/2022 4/12/2023 Frederic Rogel MD        Follow-up Information     Follow up With Specialties Details Why Contact Info    Drake Ruff Jr., MD Neurology Schedule an appointment as soon as possible for a visit in 1 day  [address]  [address]  [facility] [address]  [phone]             Frederic Rogel MD  04/12/22 0885

## 2022-04-12 NOTE — ED NOTES
"26 YOF BIB EMS c/o seizure X 2.5 hours ago. PMH Sz. Pt unsure if has been taking medication daily "I think so". Pt stated nauseous and bite to tounge. Stated last had a seizure 1 month ago. Denies any other symptoms. Denies any other complaints.     Adult Physical Assessment  LOC: Patient is awake, alert, oriented and speaking appropriately at this time.  APPEARANCE: Patient resting comfortably and appears to be in no acute distress at this time. Patient is clean and well groomed, patient's clothing is properly fastened.  SKIN:The skin is warm and dry, color consistent with ethnicity, patient has normal skin turgor and moist mucus membranes, skin intact, no breakdown or brusing noted.  MUSCULOSKELETAL: Patient moving all extremities well, no obvious swelling or deformities noted.  RESPIRATORY: Airway is open and patent, respirations are spontaneous, patient has a normal effort and rate, no accessory muscle use noted.  CARDIAC: Patient has a normal rate and rhythm, no periphreal edema noted in any extremity, capillary refill < 3 seconds in all extremities.  ABDOMEN: Soft and non tender to palpation, no abdominal distention noted.  NEUROLOGIC: Eyes open spontaneously, behavior appropriate to situation, follows commands, facial expression symmetrical, bilateral hand grasp equal and even, purposeful motor response noted, normal sensation in all extremities when touched with a finger.      VSS. ED workup in progress. Call light within pt reach. Safety measures in place: side rails up X2. Will continue to monitor.   "

## 2022-05-05 ENCOUNTER — TELEPHONE (OUTPATIENT)
Dept: NEUROLOGY | Facility: CLINIC | Age: 27
End: 2022-05-05
Payer: MEDICAID

## 2022-05-05 NOTE — TELEPHONE ENCOUNTER
----- Message from Lorenavirgilio Wakefieldard sent at 5/5/2022  2:39 PM CDT -----  Contact: Patient  Type: Patient Call Back         Who called: Patient         What is the request in detail: I have Syeda Gonzalez calling to speak w/ Dr. Ruff's staff regarding issues she ha a month ago; states she has been expecting a call back to be seen for some time now; want sot speak with staff regarding appt and to also discuss current issues.concerns; seizures related/ getting put on disability; please advise           Best call back number: [phone]         Additional Information:    [facility] - [place], [place] - [address] [address]  [address]  [place] [address]  Phone: [phone] Fax: [phone]             Thank You

## 2022-05-18 ENCOUNTER — TELEPHONE (OUTPATIENT)
Dept: NEUROLOGY | Facility: CLINIC | Age: 27
End: 2022-05-18
Payer: MEDICAID

## 2022-05-18 NOTE — TELEPHONE ENCOUNTER
Spoke to pt- she states that she had a seizure last month and states her heart was racing--she was concerned this could be due to the Vimpat. Pt states she has been on Vimpat for a while and does not remember having this issue in the past when she had seizures. Scheduled pt a f/u for 5/19/22 @ 10:40am. Advised pt this may be discussed further at her appt tomorrow.  Please advise.

## 2022-05-18 NOTE — TELEPHONE ENCOUNTER
----- Message from Bridgette Harding sent at 5/18/2022  2:19 PM CDT -----  Regarding: advice  Contact: patient  Type: Needs Medical Advice  Who Called:  patient  Symptoms (please be specific):    How long has patient had these symptoms:    Pharmacy name and phone #:    Best Call Back Number: [phone]  Additional Information: Patient states the last time she had a seizure her heart was racing fast. She would like advise if it could be the Vimpat she is taking. Please call patient to advise.Thanks!

## 2022-05-19 ENCOUNTER — OFFICE VISIT (OUTPATIENT)
Dept: NEUROLOGY | Facility: CLINIC | Age: 27
End: 2022-05-19
Payer: MEDICAID

## 2022-05-19 VITALS
WEIGHT: 210.88 LBS | BODY MASS INDEX: 37.35 KG/M2 | SYSTOLIC BLOOD PRESSURE: 134 MMHG | HEART RATE: 102 BPM | DIASTOLIC BLOOD PRESSURE: 95 MMHG | RESPIRATION RATE: 18 BRPM

## 2022-05-19 DIAGNOSIS — G40.909 SEIZURE DISORDER: Primary | ICD-10-CM

## 2022-05-19 DIAGNOSIS — R00.2 PALPITATIONS: ICD-10-CM

## 2022-05-19 PROCEDURE — 99999 PR PBB SHADOW E&M-EST. PATIENT-LVL III: ICD-10-PCS | Mod: PBBFAC,,, | Performed by: PSYCHIATRY & NEUROLOGY

## 2022-05-19 PROCEDURE — 99999 PR PBB SHADOW E&M-EST. PATIENT-LVL III: CPT | Mod: PBBFAC,,, | Performed by: PSYCHIATRY & NEUROLOGY

## 2022-05-19 PROCEDURE — 1159F PR MEDICATION LIST DOCUMENTED IN MEDICAL RECORD: ICD-10-PCS | Mod: CPTII,,, | Performed by: PSYCHIATRY & NEUROLOGY

## 2022-05-19 PROCEDURE — 3080F PR MOST RECENT DIASTOLIC BLOOD PRESSURE >= 90 MM HG: ICD-10-PCS | Mod: CPTII,,, | Performed by: PSYCHIATRY & NEUROLOGY

## 2022-05-19 PROCEDURE — 3075F SYST BP GE 130 - 139MM HG: CPT | Mod: CPTII,,, | Performed by: PSYCHIATRY & NEUROLOGY

## 2022-05-19 PROCEDURE — 3008F BODY MASS INDEX DOCD: CPT | Mod: CPTII,,, | Performed by: PSYCHIATRY & NEUROLOGY

## 2022-05-19 PROCEDURE — 1159F MED LIST DOCD IN RCRD: CPT | Mod: CPTII,,, | Performed by: PSYCHIATRY & NEUROLOGY

## 2022-05-19 PROCEDURE — 3080F DIAST BP >= 90 MM HG: CPT | Mod: CPTII,,, | Performed by: PSYCHIATRY & NEUROLOGY

## 2022-05-19 PROCEDURE — 3075F PR MOST RECENT SYSTOLIC BLOOD PRESS GE 130-139MM HG: ICD-10-PCS | Mod: CPTII,,, | Performed by: PSYCHIATRY & NEUROLOGY

## 2022-05-19 PROCEDURE — 3008F PR BODY MASS INDEX (BMI) DOCUMENTED: ICD-10-PCS | Mod: CPTII,,, | Performed by: PSYCHIATRY & NEUROLOGY

## 2022-05-19 PROCEDURE — 99213 OFFICE O/P EST LOW 20 MIN: CPT | Mod: PBBFAC,PO | Performed by: PSYCHIATRY & NEUROLOGY

## 2022-05-19 PROCEDURE — 99214 OFFICE O/P EST MOD 30 MIN: CPT | Mod: S$PBB,,, | Performed by: PSYCHIATRY & NEUROLOGY

## 2022-05-19 PROCEDURE — 99214 PR OFFICE/OUTPT VISIT, EST, LEVL IV, 30-39 MIN: ICD-10-PCS | Mod: S$PBB,,, | Performed by: PSYCHIATRY & NEUROLOGY

## 2022-05-19 RX ORDER — AMOXICILLIN 500 MG/1
500 CAPSULE ORAL 3 TIMES DAILY
COMMUNITY
Start: 2022-05-18 | End: 2022-11-10

## 2022-05-19 RX ORDER — LACOSAMIDE 200 MG/1
200 TABLET ORAL EVERY 12 HOURS
Qty: 60 TABLET | Refills: 5 | Status: ON HOLD | OUTPATIENT
Start: 2022-05-19 | End: 2022-11-15 | Stop reason: SDUPTHER

## 2022-05-19 RX ORDER — IBUPROFEN 800 MG/1
800 TABLET ORAL EVERY 6 HOURS PRN
COMMUNITY
Start: 2022-05-18 | End: 2022-11-10

## 2022-05-19 NOTE — PROGRESS NOTES
"Date of service:  2022    Chief complaint:  Seizures    Interval history:  The patient is a 27 y.o. female seen previously for seizures.  I last saw her in .  She reports that she has had 5-6 events she characterizes as seizures since our last visit.  At our last visit, we transitioned from Zonegran to Keppra, while continuing her Vimpat.  She is presently taking the Vimpat 100 mg BID and Keppra 1000 mg BID.  She feels like her memory may have improved since stopping the Zonegran.  She reports that she has had some heart racing, but she denies concern for other side effects.    History of present illness:  The patient is a 27 y.o. female referred for evaluation of episodes suspicious for seizures.  She has seen Dr. St.  This is my first time seeing her.     She gives no clear history of aura.  She does report having felt abnormally cold throughout the day leading up to her seizures, though.  Her seizure is characterized by a loss of consciousness and reported convulsive activity.   She did have tongue biting on the sides.  This component of this spell lasted for what sounds to be a matter of minutes and occurred back to back.  Afterwards, she had fatigue/confusion.  The patient had, she believes, 3-4 events on the day of her hospitalization in May.  She did have a seizure about 4 years ago while on Ultram.  Also, she had some episode with loss of consciousness and convulsions during a blood draw about 3 years ago.    The patient has a possible family history of seizures ("I heard that my great-great-grandmother had epilepsy."  Also seizures in 2 distant cousins who happen to be on different sides of the family.).  She reports no history of prenatal/ complications. There is no history of febrile seizures.  She notes no history of CNS infections. She claims no history of significant head trauma. There is no history of developmental delay.    Current AEDs:  Keppra 1000 mg BID  Vimpat 100 mg " "BID    Prior AEDs:  Lamictal  Zonegran    Prior HPI from Dr. St (5/16):  "Patient is a RH 21 y.o. female presents with seizure activity. Pt with hx of past seizure precipitated from use of tramadol. On day of admission brought in with seizure activity, had second seizure event in ED, + tongue biting, no loss of bladder. No hx of head trauma, meningitis or febrile seizures. No illicit drug use or excessive alcohol. 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   Spoke with pt's matthew; she had two previous seizures, one from tramadol, one while giving blood. Recently has had some stressors, poor sleep. No new medications"      Past Medical History:   Diagnosis Date    Asthma     Depression affecting pregnancy     IUD (intrauterine device) in place     placed today 09/10/18    Mental disorder     Seizures     Epilepsy       Past Surgical History:   Procedure Laterality Date    ADENOIDECTOMY W/ MYRINGOTOMY AND TUBES      CHOLECYSTECTOMY         Family History   Problem Relation Age of Onset    Heart disease Father     Heart disease Maternal Grandfather     Heart disease Paternal Grandmother     Heart disease Paternal Grandfather     Congenital heart disease Neg Hx     Cardiomyopathy Neg Hx     Arrhythmia Neg Hx     Hypertension Neg Hx     Pacemaker/defibrilator Neg Hx          Social History     Socioeconomic History    Marital status: Single   Tobacco Use    Smoking status: Never Smoker    Smokeless tobacco: Never Used   Substance and Sexual Activity    Alcohol use: Yes     Comment: occasionally    Drug use: No    Sexual activity: Not Currently     Partners: Male     Birth control/protection: None     Comment:     Social History Narrative    Fob - lad coronary artery bridge, sister enlarged heart      Review of Systems  A comprehensive ROS was previously performed.  It is not significantly changed except as noted above.     Physical exam:  BP (!) 134/95 (BP Location: Left " "arm, Patient Position: Sitting, BP Method: Medium (Automatic))   Pulse 102   Resp 18   Wt 95.7 kg (210 lb 13.9 oz)   BMI 37.35 kg/m²   General: Well developed, obese.  No acute distress.  HEENT: Atraumatic, normocephalic.  Neck: Supple, trachea midline.  Cardiovascular: Regular rate and rhythm.  Pulmonary: No increased work of breathing.  Abdomen/GI: No guarding.  Musculoskeletal: No obvious joint deformities, moves all extremities well.    Neurological exam:  Mental status: Awake and alert.  Oriented x4.  Speech fluent and appropriate.  Recent and remote memory appear to be intact.  Fund of knowledge normal.  Cranial nerves: Pupils equal round and reactive to light, extraocular movements intact, facial strength and sensation intact bilaterally, palate and tongue midline, hearing grossly intact bilaterally.  Motor: 5 out of 5 strength throughout the upper and lower extremities bilaterally. Normal bulk and tone.  Sensation: Intact to light touch and temperature bilaterally.  DTR: 2+ at the knees and biceps bilaterally.  Coordination: Finger-nose-finger testing intact bilaterally.  Gait: Normal gait. Good tandem.    Data base:  Notes of the referring physician were reviewed.  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    Labs (9/18):  CMP: unremarkable  CBC: includes HCT=36  ZNS: 26    MRI brain (5/16):  "Normal MRI of the brain with and without gadolinium"    EEG (5/16):  "IMPRESSION: This is an abnormal EEG during wakefulness, drowsiness and sleep. Independent left and right temporal focal slowing was noted. In addition, independent left and right temporal focal sharp waves were noted.  CLINICAL CORRELATION: The patient is a 21-year-old female with a history of seizures who is currently maintained on Keppra. This is an abnormal EEG during wakefulness, drowsiness and sleep. The presence of independent left and right temporal focal slowing is suggestive of " "focal neuronal dysfunction in these regions. The presence of independent left and right temporal sharp waves confers an increased risk of focal seizures from both temporal lobes. During this study, no seizures were recorded."    THOMAS (9/17):  Normal    Assessment and plan:  The patient is a 27 y.o. female who presents for follow up on seizures. I suspect that her events represent partial onset seizures with secondary generalization.  Based on the prior EEG, a temporal lobe focus is likely; however, it is not clear whether she has bilateral seizure foci or if all of her seizures originate from a single temporal lobe.  The etiology for her epilepsy is unclear. From an epilepsy management perspective, we will increase her Vimpat to 200 mg BID and will continue her Keppra due to memory concerns.  We will check labs for medication monitoring purposes.  Medication side effects were discussed with the patient.  Medication compliance was emphasized.  We will repeat EEG and will then likely proceed with ambulatory EEG.  State law as it pertains to driving for individuals with seizures has been discussed.  She is not to drive until seizure free for 6 months.  The patient has also been counseled on seizure safety.  Teratogenicity of AEDs were discussed.  She was instructed to make sure she was utilizing a reliable means of birth control.  Should she decide to become pregnant, she is to contact us first, so we can determine what medication adjustments, if any, are appropriate.  The patient was counseled that the risks posed by uncontrolled seizures typically exceeds that posed by the medications themselves. Consequently, the patient was advised of the importance of continuing her anticonvulsant medication should she become pregnant.  She was advised to take supplemental folate at 1 mg daily.  Should she desire to become pregnant or actually become pregnant, we will plan on increasing it to 4 mg daily.  We also discussed " lactation issues with AEDs.  I also discussed with the patient the need for a PCP.      For her reported palpitations, we will obtain a Holter study.    We will plan on seeing the patient back in a few weeks.

## 2022-05-23 ENCOUNTER — TELEPHONE (OUTPATIENT)
Dept: NEUROLOGY | Facility: CLINIC | Age: 27
End: 2022-05-23
Payer: MEDICAID

## 2022-05-23 DIAGNOSIS — G40.909 SEIZURE DISORDER: ICD-10-CM

## 2022-05-23 DIAGNOSIS — R00.2 PALPITATIONS: Primary | ICD-10-CM

## 2022-05-23 NOTE — TELEPHONE ENCOUNTER
72 hour holter ordered on 5/19/22- Only 24 hr, 48 hr, or 30 day are available at this time. Please advise.

## 2022-05-24 ENCOUNTER — HOSPITAL ENCOUNTER (OUTPATIENT)
Dept: NEUROLOGY | Facility: HOSPITAL | Age: 27
Discharge: HOME | End: 2022-05-24
Attending: PSYCHIATRY & NEUROLOGY
Payer: MEDICAID

## 2022-05-24 DIAGNOSIS — G40.909 SEIZURE DISORDER: ICD-10-CM

## 2022-05-24 PROCEDURE — 95816 EEG AWAKE AND DROWSY: CPT | Mod: 26,,, | Performed by: PSYCHIATRY & NEUROLOGY

## 2022-05-24 PROCEDURE — 95816 PR EEG,W/AWAKE & DROWSY RECORD: ICD-10-PCS | Mod: 26,,, | Performed by: PSYCHIATRY & NEUROLOGY

## 2022-05-24 PROCEDURE — 95819 EEG AWAKE AND ASLEEP: CPT

## 2022-05-24 NOTE — TELEPHONE ENCOUNTER
Spoke with the pt mother, informed her the 72 hour Holter was not available and the order was changed to 48 hr. I provided the number to schedule in the [place] location. Mother v/u.

## 2022-05-25 NOTE — PROCEDURES
EEG,w/awake & asleep record    Date/Time: 5/24/2022 8:59 AM  Performed by: Anoop Vee MD  Authorized by: Drake Ruff Jr., MD       ELECTROENCEPHALOGRAM REPORT    DATE OF SERVICE: 5/24/22  EEG NUMBER: ON   REQUESTED BY: Elzbieta  LOCATION OF SERVICE: [facility]   Electroencephalographic (EEG) recording is with electrodes placed according to the International 10-20 placement system.  Thirty two (32) channels of digital signal (sampling rate of 512/sec) including T1 and T2 was simultaneously recorded from the scalp and may include  EKG, EMG, and/or eye monitors.  Recording band pass was 0.1 to 512 hz.  Digital video recording of the patient is simultaneously recorded with the EEG.  The patient is instructed report clinical symptoms which may occur during the recording session.  EEG and video recording is stored and archived in digital format. Activation procedures which include photic stimulation, hyperventilation and instructing patients to perform simple task are done in selected patients.    The EEG is displayed on a monitor screen and can be reviewed using different montages.  Computer assisted analysis is employed to detect spike and electrographic seizure activity.   The entire record is submitted for computer analysis.  The entire recording is visually reviewed and the times identified by computer analysis as being spikes or seizures are reviewed again.  Compresses spectral analysis (CSA) is also performed on the activity recorded from each individual channel.  This is displayed as a power display of frequencies from 0 to 30 Hz over time.   The CSA is reviewed looking for asymmetries in power between homologous areas of the scalp and then compared with the original EEG recording.     Jemstep software was also utilized in the review of this study.  This software suite analyzes the EEG recording in multiple domains.  Coherence and rhythmicity is computed to identify EEG sections which may  contain organized seizures.  Each channel undergoes analysis to detect presence of spike and sharp waves which have special and morphological characteristic of epileptic activity.  The routine EEG recording is converted from spacial into frequency domain.  This is then displayed comparing homologous areas to identify areas of significant asymmetry.  Algorithm to identify non-cortically generated artifact is used to separate eye movement, EMG and other artifact from the EEG    EEG FINDINGS  The record shows a good  organization at rest, consisting of a 10-11 Hz posterior dominant rhythm with good  reactivity. There is mild bilateral beta activity. There are multiple runs of generalized 3 Hz spike and wave activity lasting 6-8 seconds at a time with no clinical behavior change.    Drowsiness is characterized by attenuation of the background, vertex waves, and bilateral theta slowing.    Provocative maneuvers including hyperventilation and photic stimulation were performed.     EKG recording shows a sinus rhythm.    There is no push button or clinical event.    IMPRESSION:  Abnormal study due to the presence of 3 Hz generalized spike and wave epileptiform discharges consistent with a primary generalized epilepsy.    Anoop Vee MD

## 2022-06-16 ENCOUNTER — TELEPHONE (OUTPATIENT)
Dept: CARDIOLOGY | Facility: HOSPITAL | Age: 27
End: 2022-06-16

## 2022-07-31 ENCOUNTER — HOSPITAL ENCOUNTER (EMERGENCY)
Facility: HOSPITAL | Age: 27
Discharge: HOME | End: 2022-07-31
Attending: EMERGENCY MEDICINE
Payer: MEDICAID

## 2022-07-31 VITALS
HEART RATE: 88 BPM | SYSTOLIC BLOOD PRESSURE: 110 MMHG | RESPIRATION RATE: 16 BRPM | TEMPERATURE: 98 F | OXYGEN SATURATION: 100 % | WEIGHT: 190 LBS | DIASTOLIC BLOOD PRESSURE: 70 MMHG | BODY MASS INDEX: 33.66 KG/M2

## 2022-07-31 DIAGNOSIS — G40.909 SEIZURE DISORDER: Primary | ICD-10-CM

## 2022-07-31 PROCEDURE — 80177 DRUG SCRN QUAN LEVETIRACETAM: CPT | Performed by: EMERGENCY MEDICINE

## 2022-07-31 PROCEDURE — 99284 EMERGENCY DEPT VISIT MOD MDM: CPT | Mod: 25

## 2022-07-31 PROCEDURE — 63600175 PHARM REV CODE 636 W HCPCS: Performed by: EMERGENCY MEDICINE

## 2022-07-31 PROCEDURE — 25000003 PHARM REV CODE 250: Performed by: EMERGENCY MEDICINE

## 2022-07-31 PROCEDURE — 96365 THER/PROPH/DIAG IV INF INIT: CPT

## 2022-07-31 PROCEDURE — 96375 TX/PRO/DX INJ NEW DRUG ADDON: CPT

## 2022-07-31 RX ORDER — LEVETIRACETAM 10 MG/ML
1000 INJECTION INTRAVASCULAR
Status: COMPLETED | OUTPATIENT
Start: 2022-07-31 | End: 2022-07-31

## 2022-07-31 RX ORDER — LACOSAMIDE 50 MG/1
200 TABLET ORAL EVERY 12 HOURS
Status: DISCONTINUED | OUTPATIENT
Start: 2022-07-31 | End: 2022-07-31 | Stop reason: HOSPADM

## 2022-07-31 RX ORDER — LORAZEPAM 2 MG/ML
1 INJECTION INTRAMUSCULAR
Status: COMPLETED | OUTPATIENT
Start: 2022-07-31 | End: 2022-07-31

## 2022-07-31 RX ADMIN — LACOSAMIDE 200 MG: 50 TABLET, FILM COATED ORAL at 10:07

## 2022-07-31 RX ADMIN — LEVETIRACETAM INJECTION 1000 MG: 10 INJECTION INTRAVENOUS at 10:07

## 2022-07-31 RX ADMIN — LORAZEPAM 1 MG: 2 INJECTION, SOLUTION INTRAMUSCULAR; INTRAVENOUS at 10:07

## 2022-07-31 NOTE — ED PROVIDER NOTES
Encounter Date: 7/31/2022       History     Chief Complaint   Patient presents with    Seizures     Believes may not  have taken meds since yesterday morning     Patient presents emergency department with reported seizure activity this morning patient believes that she had for gotten to take her medications yesterday patient has a history of seizures she is currently on Vimpat and Keppra the she has not taken her medications this morning there are no reported injuries patient denies any recent illness and no antecedent head injury reported        Review of patient's allergies indicates:   Allergen Reactions    Benadryl [diphenhydramine hcl] Other (See Comments)     Lowers seizure threshold.    Quinolones Other (See Comments)     Lowers seizure threshold.     Tramadol Other (See Comments)     seizures     Past Medical History:   Diagnosis Date    Asthma     Depression affecting pregnancy     IUD (intrauterine device) in place     placed today 09/10/18    Mental disorder     Seizures     Epilepsy     Past Surgical History:   Procedure Laterality Date    ADENOIDECTOMY W/ MYRINGOTOMY AND TUBES      CHOLECYSTECTOMY       Family History   Problem Relation Age of Onset    Heart disease Father     Heart disease Maternal Grandfather     Heart disease Paternal Grandmother     Heart disease Paternal Grandfather     Congenital heart disease Neg Hx     Cardiomyopathy Neg Hx     Arrhythmia Neg Hx     Hypertension Neg Hx     Pacemaker/defibrilator Neg Hx      Social History     Tobacco Use    Smoking status: Never Smoker    Smokeless tobacco: Never Used   Substance Use Topics    Alcohol use: Yes     Comment: occasionally    Drug use: No     Review of Systems   Neurological: Positive for seizures.   All other systems reviewed and are negative.      Physical Exam     Initial Vitals [07/31/22 0959]   BP Pulse Resp Temp SpO2   120/78 102 18 98.6 °F (37 °C) 98 %      MAP       --         Physical  Exam    Constitutional: She appears well-developed and well-nourished. No distress.   HENT:   Head: Normocephalic and atraumatic.   Right Ear: External ear normal.   Left Ear: External ear normal.   Mouth/Throat: Oropharynx is clear and moist.   Eyes: Conjunctivae and EOM are normal. Pupils are equal, round, and reactive to light.   Neck: Neck supple.   Normal range of motion.  Cardiovascular: Normal rate, regular rhythm, normal heart sounds and intact distal pulses.   Pulmonary/Chest: Breath sounds normal. No respiratory distress.   Abdominal: Abdomen is soft. Bowel sounds are normal. There is no abdominal tenderness.   Musculoskeletal:         General: No edema. Normal range of motion.      Cervical back: Normal range of motion and neck supple.     Neurological: She is alert and oriented to person, place, and time. She has normal strength. No cranial nerve deficit. GCS score is 15. GCS eye subscore is 4. GCS verbal subscore is 5. GCS motor subscore is 6.   Skin: Skin is warm and dry. Capillary refill takes less than 2 seconds. No rash noted.   Psychiatric: She has a normal mood and affect. Her behavior is normal.         ED Course   Procedures  Labs Reviewed   LEVETIRACETAM  (KEPPRA) LEVEL          Imaging Results    None          Medications   lacosamide tablet 200 mg (200 mg Oral Given 7/31/22 1028)   levETIRAcetam in NaCl (iso-os) IVPB 1,000 mg (0 mg Intravenous Stopped 7/31/22 1044)   lorazepam injection 1 mg (1 mg Intravenous Given 7/31/22 1016)     Medical Decision Making:   ED Management:  No further seizure activity in the emergency department I have loaded patient with Keppra and given her her morning dose of Vimpat recommend she follow-up with Neurology I will obtain a Keppra level for follow-up although it sounds like patient missed her dosing                      Clinical Impression:   Final diagnoses:  [G40.909] Seizure disorder (Primary)          ED Disposition Condition    Discharge Stable        ED  Prescriptions     None        Follow-up Information     Follow up With Specialties Details Why Contact Info    Drake Ruff Jr., MD Neurology In 1 day for re-examination of your symptoms [address] [facility]  [address]  [facility] [address]  [phone]             Omero Mcmillan MD  07/31/22 0579

## 2022-08-03 ENCOUNTER — TELEPHONE (OUTPATIENT)
Dept: NEUROLOGY | Facility: CLINIC | Age: 27
End: 2022-08-03
Payer: MEDICAID

## 2022-08-03 LAB — LEVETIRACETAM SERPL-MCNC: 20.1 UG/ML (ref 10–40)

## 2022-08-03 NOTE — TELEPHONE ENCOUNTER
----- Message from Drake Kern sent at 8/3/2022  9:45 AM CDT -----  Regarding: f/u new sz activity 7/30  Type:  Sooner Appointment Request    Caller is requesting a sooner appointment.     Name of Caller:  Syeda    When is the first available appointment?  Dept book // ins    Symptoms:  f/u new sz activity 7/30    Best Call Back Number:  [phone]     Additional Information:

## 2022-09-07 ENCOUNTER — OFFICE VISIT (OUTPATIENT)
Dept: NEUROLOGY | Facility: CLINIC | Age: 27
End: 2022-09-07
Payer: MEDICAID

## 2022-09-07 DIAGNOSIS — R41.3 MEMORY CHANGES: ICD-10-CM

## 2022-09-07 DIAGNOSIS — F33.0 MILD EPISODE OF RECURRENT MAJOR DEPRESSIVE DISORDER: ICD-10-CM

## 2022-09-07 DIAGNOSIS — G40.909 SEIZURE DISORDER: ICD-10-CM

## 2022-09-07 PROCEDURE — 99499 NO LOS: ICD-10-PCS | Mod: 95,,, | Performed by: PSYCHIATRY & NEUROLOGY

## 2022-09-07 PROCEDURE — 90791 PR PSYCHIATRIC DIAGNOSTIC EVALUATION: ICD-10-PCS | Mod: 95,FQ,, | Performed by: PSYCHIATRY & NEUROLOGY

## 2022-09-07 PROCEDURE — 99499 UNLISTED E&M SERVICE: CPT | Mod: 95,,, | Performed by: PSYCHIATRY & NEUROLOGY

## 2022-09-07 PROCEDURE — 90791 PSYCH DIAGNOSTIC EVALUATION: CPT | Mod: 95,FQ,, | Performed by: PSYCHIATRY & NEUROLOGY

## 2022-09-07 NOTE — PROGRESS NOTES
NEUROPSYCHOLOGY CONSULT (TELEHEALTH)    Referral Information  Name: Syeda Gonzalez  MRN: [id number]  : 1995  Age: 27 y.o.  Race: White  Gender: female  Referring Provider: Drake Ruff Jr., MD  Billing: See below for details as coding/billing has changed   Telemedicine:   The patient location is: [place], [place]  The provider location is: [place], [place]  The chief complaint leading to consultation/medical necessity is: Cognitive concerns  Visit type: Virtual visit with synchronous audio only (telephone)  Total time spent with patient: 55 minutes  The reason for the audio only service rather than synchronous audio and video virtual visit was related to technical difficulties or patient preference/necessity.     Each patient to whom I provide medical services by telemedicine is:  (1) informed of the relationship between the physician and patient and the respective role of any other health care provider with respect to management of the patient; and (2) notified that they may decline to receive medical services by telemedicine and may withdraw from such care at any time. Patient verbally consented to receive this service via voice-only telephone call.    This service was not originating from a related E/M service provided within the previous 7 days nor will  to an E/M service or procedure within the next 24 hours or my soonest available appointment.  Prevailing standard of care was able to be met in this audio-only visit.    Consent/Emergency Plan: The patient expressed an understanding of the purpose of the evaluation and consented to all procedures. I informed the patient of limits to confidentiality and discussed an emergency plan.    SUMMARY/TREATMENT PLAN   Results from the interview indicate the following diagnoses and treatment plan recommendations. The patient is likely able to follow a treatment plan without help from family.    Diagnoses/Plan:  Problem List Items Addressed This Visit           "Neuro    Seizure disorder    Memory changes       Psychiatric    Mild episode of recurrent major depressive disorder     Summary/Recommendations:  Ms. Gonzalez reported a gradual onset of cognitive concerns in 2017. She is followed by Neurology for treatment of partial onset seizures with secondary generalization (possible temporal lobe focus). Performance on a recent brief mental status measure was within normal limits. She also has a history of depression and current depressed mood. She remains independent in activities of daily living from a cognitive standpoint.    Ms. Gonzalez will complete a neuropsychological evaluation on 9/21/2022.    Thank you for allowing me to participate in Ms. Gonzalez's care.  If you have any questions, please contact me at [phone].     Sameera Miles, Ph.D., ABPP  Board Certified in Clinical Neuropsychology  [facility] - Department of Neurology    HISTORY OF PRESENT ILLNESS AND CURRENT SYMPTOMS     Ms. Gonzalez has active problems noted below. She has been followed by Neurology intermittently since 2016 for treatment of seizure disorder. During a visit on 1/18/2022, she reported concerns with her memory. Performance on a brief mental status measure was within normal limits (Mini Mental Status Exam [MMSE]=28/30). She was referred for evaluation of cognitive concerns and as part of a workup for potential surgery in the future. She was seen emergently in February, March, April, and July 2022 following seizure events.    Ms. Gonzalez reported she has been having more seizures than normal over the past year. Earlier this year, she recalled having a seizure when only she and her boys were home, and they ran to the neighbor's house to tell them. Her mother moved in around April 2022. Her last seizure was maybe 2-3 months ago. She said about a year ago, prior to a seizure, she felt as if the noise on the television was "disappearing" but otherwise denied any aura.     Regarding " "surgical considerations, she reported that her mother is a strong support. She has not given much thought to surgery and asked questions about the potential outcomes. She mentioned she was feeling tired of taking the medication but felt the thought of surgery was "scary."    Cognitive Symptoms:  Type/Examples:   Attention: She reported difficulty with attention and focus.   Mental Speed: She reported declines.  Memory: She reported forgetting information quickly and repeating stories multiple times. Reminders sometimes help.  Language: She reported word-finding difficulty. She knows what she wants to say but cannot get it out; clues do not help. She reported difficulty with comprehension and "rewording" what others have said.  Visuospatial/Perceptual: She denied difficulty.  Executive Functioning: She reported "big problems" with organization. She writes her appointments in a book but lost the book.  Onset: Gradual in onset in 2017, per records.   Course: She was uncertain of the timeline but felt it "has a lot to do with my seizures" and trying to keep up with her two sons.    Current Functional Status/Needs:  ADLs  Self-Care Eating Safety Other   Independent Independent  Independent      Instrumental IADLs:   Driving Medications/Health Household Finances   She stopped driving because of seizures. She reported she is very diligent about her son's medication but sometimes forgets to take hers. She uses a pill box. She cooks for the household.  She is applying for disability and plans to manage the funding.     Psychiatric/Behavioral Symptoms:  Mood:  Depression/Dysphoria Anxiety/Fearfulness Irritability   She reported depressed mood over the last month. She has not been taking medication or engaging in therapy. She stopped taking Prozac because she felt like it was not helping. She reported anxiety when, "nothing is going as planned, as it should be." She was uncertain how often this happens.  She reported an " "increase in irritability but associated it with menstruation. She has has been on her period for 14-15 days, which is not typical for her but sometimes happens. She talked with her gynecologist yesterday.     Behavior:  Agitation/Resistance Delusions/Paranoia Hallucinations   None reported. None reported. None reported. She said sometimes she talks with "a little voice in my head." It is her voice. It happens often when she is about to start her period. It comments on others' motivations and on what is going on. She described it as her intuition.     Apathy/Motivation Repetitive/Restlessness Other   She reported not wanting to leave the house over the last month. She said, "it takes a lot to get me motivated" over the last two months. None reported.      Neurovegetative:  Sleep/Nighttime  Appetite Energy   She reported poor sleep; she takes melatonin if she has not had a good sleep for 3-4 days. She reported thinking about the events of the day when she lays down and has difficulty staying asleep. On a good night, she sleeps 6-7 hours and on a bad night, she sleeps 3-4 hours. She was uncertain whether she snores; she does not have movement in sleep. She feels as if she's falling out of bed and "jumps" regularly. She reported her appetite is good. Sometimes, she skips breakfast. She reported fatigue and "no energy." She used to enjoy cycling at the gym but has not done it in several months.     Suicidal/Homicidal Ideation: None reported    Physical Symptoms: She reported feeling dizzy if she has to move too fast. She reported she is supposed to wear glasses and gets headaches if she does not. She denied changes in hearing.     PERTINENT BACKGROUND INFORMATION   SOCIAL HISTORY    Family Status: Single; she has two children (9 and 4)  Current Living Situation: Lives with her mother  Primary Source of Support: Family  Daily Activities: watching TV, looking at her phone, making dinner  Stressors: she reported some strain " with extended family members and damaged trust  Other Factors:  Educational Level: Graduated high school; attended some college but had trouble studying after she started having seizures  Occupational Status and History: She is applying for disability  Other: No pre or  complications; no developmental delays or early childhood illnesses    Family History   Problem Relation Age of Onset    Heart disease Father     Heart disease Maternal Grandfather     Heart disease Paternal Grandmother     Heart disease Paternal Grandfather     Congenital heart disease Neg Hx     Cardiomyopathy Neg Hx     Arrhythmia Neg Hx     Hypertension Neg Hx     Pacemaker/defibrilator Neg Hx      Family Neurologic History: Family history of seizures (great-great-grandmother, distant cousins on both sides of the family); her oldest son has seizures  Family Psychiatric History: Negative for heritable risk factors    MEDICAL STATUS  Patient Active Problem List   Diagnosis    Loss of consciousness    Nonintractable complex partial epilepsy    Medication exposure during first trimester of pregnancy    Family history of cardiac disorder in father    Depression during pregnancy in third trimester    Severe depressive episode without psychotic symptoms in postpartum period    Complicated grieving    Panic attacks    Suicidal ideations    Mild episode of recurrent major depressive disorder    Anemia    Seizure disorder    Memory changes     Past Medical History:   Diagnosis Date    Asthma     Depression affecting pregnancy     IUD (intrauterine device) in place     placed today 09/10/18    Mental disorder     Seizures     Epilepsy     Past Surgical History:   Procedure Laterality Date    ADENOIDECTOMY W/ MYRINGOTOMY AND TUBES      CHOLECYSTECTOMY       Updated/Relevant Neurologic History:  Falls: None reported  TBI: None reported  Seizures: She reported her first seizure was in ; [place] children's [place]. The  "night before, her aunt gave her Tramadol, and she had a seizure. They said she was allergic. She was diagnosed in 2016. From Dr. Ruff's note: "I suspect that her events represent partial onset seizures with secondary generalization.  Based on the EEG, a temporal lobe focus is likely; however, it is not clear whether she has bilateral seizure foci or if all of her seizures originate from a single temporal lobe.  The etiology for her epilepsy is unclear."  Stroke: None reported  Movement Concerns: None reported  Prior Neuropsychological Testing: None reported  Referral Diagnosis: G40.909 (ICD-10-CM) - Seizure disorder    Recent Labs  No results found for: [id number]  Lab Results   Component Value Date    RPR Non-reactive 04/25/2018     No results found for: FOLATE  Lab Results   Component Value Date    TSH 1.420 02/04/2022     Lab Results   Component Value Date    HGBA1C 4.8 08/25/2018     Lab Results   Component Value Date    ECA46KGGF Negative 04/25/2018     Imaging    Results for orders placed or performed during the hospital encounter of 04/12/22   CT Head Without Contrast    Narrative    CMS MANDATED QUALITY DATA - CT RADIATION - 436    All CT scans at this facility utilize dose modulation, iterative reconstruction, and/or weight based dosing when appropriate to reduce radiation dose to as low as reasonably achievable.          Reason: Seizure, new-onset, no history of trauma seizure    TECHNIQUE: Head CT without IV contrast.    COMPARISON: 2/4/2022    FINDINGS:  Gray-white differentiation is maintained without hemorrhage, midline shift, or mass effect.    The ventricles and cisterns are maintained.    Calvarium is intact. Visualized sinuses are clear.    IMPRESSION:  Normal noncontrast head CT.    Electronically signed by:  Ken Tejada MD  4/12/2022 4:37 PM CDT Workstation: [id number]   Results for orders placed or performed during the hospital encounter of 05/11/16   MRI Brain W WO Contrast    Narrative    " "The preliminary and final reports are concordant.  Postgadolinium (8 cc of Gadovist) images were obtained through the brain.  Brain and ventricles appear normal.  There is no evidence of mass effect or midline shift.  No abnormal extra-axial collections are seen.  There is no evidence of abnormal enhancement or restricted diffusion.  There is no evidence of temporal lobe asymmetry.    Impression     Normal MRI of the brain with and without gadolinium  ______________________________________     Electronically signed by: SHANNA REYNOLDS MD  Date:     05/13/16  Time:    08:17      MRI brain (5/16):  "Normal MRI of the brain with and without gadolinium"     EEG (5/2022):  IMPRESSION:  Abnormal study due to the presence of 3 Hz generalized spike and wave epileptiform discharges consistent with a primary generalized epilepsy.    EEG (5/16):  "IMPRESSION: This is an abnormal EEG during wakefulness, drowsiness and sleep. Independent left and right temporal focal slowing was noted. In addition, independent left and right temporal focal sharp waves were noted.  CLINICAL CORRELATION: The patient is a 21-year-old female with a history of seizures who is currently maintained on Keppra. This is an abnormal EEG during wakefulness, drowsiness and sleep. The presence of independent left and right temporal focal slowing is suggestive of focal neuronal dysfunction in these regions. The presence of independent left and right temporal sharp waves confers an increased risk of focal seizures from both temporal lobes. During this study, no seizures were recorded."     DEXA (9/17):  Normal    Current Outpatient Medications:     amoxicillin (AMOXIL) 500 MG capsule, Take 500 mg by mouth 3 (three) times daily., Disp: , Rfl:     diphenhydrAMINE-acetaminophen (TYLENOL PM)  mg Tab, Take 1 tablet by mouth nightly as needed., Disp: , Rfl:     estradioL (ESTRACE) 2 MG tablet, Take 2 mg by mouth once daily., Disp: , Rfl:     FLUoxetine 20 MG " capsule, Take 20 mg by mouth once daily., Disp: , Rfl: 0    FLUZONE QUAD [id number], PF, 60 mcg (15 mcg x 4)/0.5 mL Syrg, ADM 0.5ML IM UTD, Disp: , Rfl: 0    ibuprofen (ADVIL,MOTRIN) 800 MG tablet, Take 800 mg by mouth every 6 (six) hours as needed., Disp: , Rfl:     lacosamide (VIMPAT) 200 mg Tab tablet, Take 1 tablet (200 mg total) by mouth every 12 (twelve) hours., Disp: 60 tablet, Rfl: 5    levETIRAcetam (KEPPRA) 1000 MG tablet, Take 1 tablet (1,000 mg total) by mouth 2 (two) times daily., Disp: 60 tablet, Rfl: 11    NEXPLANON 68 mg Impl subdermal device, 68 mg by Subdermal route once., Disp: , Rfl:     ranitidine (ZANTAC) 150 MG tablet, Take 1 tablet (150 mg total) by mouth 2 (two) times daily., Disp: 60 tablet, Rfl: 0    zonisamide (ZONEGRAN) 100 MG Cap, TAKE FOUR CAPSULES (400 MG) BY MOUTH ONCE DAILY, Disp: 120 capsule, Rfl: 0  She reported she stopped taking Prozac.    Updated/Relevant Psychiatric History: She reported a history of depression in the past. She has engaged in medication management and therapy in the past, after her father passed away in 2014 and in 2018 when her childrens' father passed away. She had a seizure while pregnant and was in the ICU; she found out he passed away when she was discharged from the hospital. She was seen emergently by psychiatry for depressed mood and passive suicidal ideation post-partum. Records noted intermittent consistency in medication usage.    Substance Use: She reported no alcohol use in three months. She reported occasional marijuana use to help with symptoms; she used to turn blue when she had seizures and no longer does.    MENTAL STATUS AND OBSERVATIONS:  APPEARANCE: Not assessed  ALERTNESS/ORIENTATION: Attentive and alert. Fully oriented (x5) to time and place  GAIT: Not assessed  MOTOR MOVEMENTS/MANNERISMS: Not assessed  SPEECH/LANGUAGE: Normal in rate, rhythm, tone, and volume. No significant word finding difficulty noted. Expressive and receptive  "language was normal.  STATED MOOD/AFFECT: The patients stated mood was "okay." Affect was congruent with stated mood.   INTERPERSONAL BEHAVIOR: Rapport was quickly and easily established   SUICIDALITY/HOMICIDALITY: Denied  HALLUCINATIONS/DELUSIONS: None evidenced or endorsed  THOUGHT PROCESSES/INSIGHT: Thoughts seemed logical and goal-directed.     BILLING  Service Description CPT Code Minutes Units   Psychiatric diagnostic evaluation by physician [phone]   Neurobehavioral status exam by physician [id number]  0   Each additional hour by physician [id number]  0                  "

## 2022-09-08 PROBLEM — F33.0 MILD EPISODE OF RECURRENT MAJOR DEPRESSIVE DISORDER: Status: ACTIVE | Noted: 2018-08-21

## 2022-09-29 ENCOUNTER — TELEPHONE (OUTPATIENT)
Dept: NEUROLOGY | Facility: CLINIC | Age: 27
End: 2022-09-29
Payer: MEDICAID

## 2022-10-21 ENCOUNTER — PATIENT MESSAGE (OUTPATIENT)
Dept: NEUROLOGY | Facility: CLINIC | Age: 27
End: 2022-10-21
Payer: MEDICAID

## 2022-10-21 ENCOUNTER — TELEPHONE (OUTPATIENT)
Dept: NEUROLOGY | Facility: CLINIC | Age: 27
End: 2022-10-21
Payer: MEDICAID

## 2022-10-21 NOTE — TELEPHONE ENCOUNTER
I called the pt at 10:10 to see if she was coming to her 10:00 appt with Dr. Miles.  She said she forgot about the appt and wanted to reschedule.

## 2022-11-10 ENCOUNTER — HOSPITAL ENCOUNTER (EMERGENCY)
Facility: HOSPITAL | Age: 27
Discharge: HOME | End: 2022-11-10
Attending: EMERGENCY MEDICINE
Payer: MEDICAID

## 2022-11-10 VITALS
BODY MASS INDEX: 41.64 KG/M2 | RESPIRATION RATE: 16 BRPM | HEIGHT: 63 IN | OXYGEN SATURATION: 100 % | DIASTOLIC BLOOD PRESSURE: 92 MMHG | HEART RATE: 86 BPM | TEMPERATURE: 98 F | WEIGHT: 235 LBS | SYSTOLIC BLOOD PRESSURE: 126 MMHG

## 2022-11-10 DIAGNOSIS — K08.89 PAIN, DENTAL: Primary | ICD-10-CM

## 2022-11-10 PROCEDURE — 99283 EMERGENCY DEPT VISIT LOW MDM: CPT

## 2022-11-10 PROCEDURE — 25000003 PHARM REV CODE 250: Performed by: NURSE PRACTITIONER

## 2022-11-10 RX ORDER — OXYCODONE AND ACETAMINOPHEN 5; 325 MG/1; MG/1
1 TABLET ORAL
Status: COMPLETED | OUTPATIENT
Start: 2022-11-10 | End: 2022-11-10

## 2022-11-10 RX ORDER — DICLOFENAC SODIUM 50 MG/1
50 TABLET, DELAYED RELEASE ORAL 3 TIMES DAILY PRN
Qty: 20 TABLET | Refills: 2 | Status: SHIPPED | OUTPATIENT
Start: 2022-11-10

## 2022-11-10 RX ORDER — CLINDAMYCIN HYDROCHLORIDE 300 MG/1
300 CAPSULE ORAL EVERY 6 HOURS
Qty: 40 CAPSULE | Refills: 0 | Status: SHIPPED | OUTPATIENT
Start: 2022-11-10 | End: 2022-11-20

## 2022-11-10 RX ADMIN — OXYCODONE AND ACETAMINOPHEN 1 TABLET: 325; 5 TABLET ORAL at 11:11

## 2022-11-10 NOTE — ED NOTES
"Reports having tooth pain with "cracked hollow tooth". States could not get dentist appt until 11/16  "

## 2022-11-10 NOTE — ED PROVIDER NOTES
Encounter Date: 11/10/2022       History     Chief Complaint   Patient presents with    Dental Pain     Cracked tooth with sensitivity to cold  sore throat      Presents with complaint of dental pain.  Onset 3 days.  Patient reports she has cracked this tooth.  She has packed the tooth with aspirin.  Denies fever nausea vomiting or diarrhea rates the pain 8/10.  She has taken ibuprofen at home with some relief.  She does have an appointment on November 16th with a dentist.  Denies difficulty swallowing.    Review of patient's allergies indicates:   Allergen Reactions    Benadryl [diphenhydramine hcl] Other (See Comments)     Lowers seizure threshold.    Quinolones Other (See Comments)     Lowers seizure threshold.     Tramadol Other (See Comments)     seizures     Past Medical History:   Diagnosis Date    Asthma     Depression affecting pregnancy     IUD (intrauterine device) in place     placed today 09/10/18    Mental disorder     Seizures     Epilepsy     Past Surgical History:   Procedure Laterality Date    ADENOIDECTOMY W/ MYRINGOTOMY AND TUBES      CHOLECYSTECTOMY       Family History   Problem Relation Age of Onset    Heart disease Father     Heart disease Maternal Grandfather     Heart disease Paternal Grandmother     Heart disease Paternal Grandfather     Congenital heart disease Neg Hx     Cardiomyopathy Neg Hx     Arrhythmia Neg Hx     Hypertension Neg Hx     Pacemaker/defibrilator Neg Hx      Social History     Tobacco Use    Smoking status: Never    Smokeless tobacco: Never   Substance Use Topics    Alcohol use: Yes     Comment: occasionally    Drug use: No     Review of Systems   Constitutional:  Negative for fever.   HENT:  Positive for dental problem. Negative for drooling, facial swelling, sore throat, trouble swallowing and voice change.    Respiratory:  Negative for cough, shortness of breath and wheezing.    Cardiovascular:  Negative for chest pain, palpitations and leg swelling.    Gastrointestinal:  Negative for abdominal pain, diarrhea, nausea and vomiting.   Genitourinary:  Negative for dysuria.   Musculoskeletal:  Negative for back pain.   Skin:  Negative for rash.   Neurological:  Negative for weakness.     Physical Exam     Initial Vitals [11/10/22 1021]   BP Pulse Resp Temp SpO2   136/88 102 18 98.6 °F (37 °C) 100 %      MAP       --         Physical Exam    Constitutional: She appears well-developed and well-nourished.   HENT:   Head: Normocephalic and atraumatic.   Mouth/Throat: Oropharynx is clear and moist.   Airway patent.  No signs of Isaac's there is no swelling to the gum region.   Eyes: Conjunctivae are normal.   Neck: Neck supple.   Normal range of motion.  Cardiovascular:  Normal rate, regular rhythm and normal heart sounds.           Pulmonary/Chest: Breath sounds normal. No respiratory distress.   Musculoskeletal:         General: Normal range of motion.      Cervical back: Normal range of motion and neck supple.     Neurological: She is alert and oriented to person, place, and time. No sensory deficit. GCS score is 15. GCS eye subscore is 4. GCS verbal subscore is 5. GCS motor subscore is 6.   Skin: Skin is warm and dry. Capillary refill takes less than 2 seconds.   Psychiatric: She has a normal mood and affect. Thought content normal.       ED Course   Procedures  Labs Reviewed - No data to display       Imaging Results    None          Medications   oxyCODONE-acetaminophen 5-325 mg per tablet 1 tablet (has no administration in time range)     Medical Decision Making:   Initial Assessment:   Patient reports dental pain.  Onset 3 days.  Denies nausea vomiting and diarrhea.  She has a chipped tooth.  She has packed it with aspirin.  She has an appoint with a dentist on November 16th.  She denies difficulty swallowing or sore throat.  ED Management:  I have given this patient 100 mg of ibuprofen here in the ED. I have written a prescription for clindamycin 300 mg q.i.d.  for 10 days.  I have also given her a prescription for diclofenac.  I have instructed her not to take aspirin along with the diclofenac.  I have also instructed her to keep her appoint with a dentist.  She was given return precautions.  Have discussed this patient with Dr. Mcmillan                        Clinical Impression:   Final diagnoses:  [K08.89] Pain, dental (Primary)      ED Disposition Condition    Discharge Stable          ED Prescriptions       Medication Sig Dispense Start Date End Date Auth. Provider    clindamycin (CLEOCIN) 300 MG capsule Take 1 capsule (300 mg total) by mouth every 6 (six) hours. for 10 days 40 capsule 11/10/2022 11/20/2022 Shauna Montiel NP    diclofenac (VOLTAREN) 50 MG EC tablet Take 1 tablet (50 mg total) by mouth 3 (three) times daily as needed. 20 tablet 11/10/2022 -- Shauna Montiel NP          Follow-up Information       Follow up With Specialties Details Why Contact Info      In 3 days  Keep keep your appointment with your dentist.             Shauna Montiel NP  11/10/22 9668

## 2022-11-10 NOTE — DISCHARGE INSTRUCTIONS
Keep your appointment with your dentist.  Taking medications as prescribed.  Return to the ED if any worsening of symptoms or no improvement.

## 2022-11-10 NOTE — ED NOTES
Discharge instructions, diagnosis, medications, and follow up discussed with patient. Patient verbalized understanding. All questions and concerns answered. No needs expressed at the time. Pt is awake, alert and oriented with no acute distress noted. Respirations even and unlabored. Discharged after narcotic administration per NP. Ambulatory out of ED.

## 2022-11-14 ENCOUNTER — HOSPITAL ENCOUNTER (OUTPATIENT)
Facility: HOSPITAL | Age: 27
Discharge: HOME | End: 2022-11-15
Attending: EMERGENCY MEDICINE | Admitting: HOSPITALIST
Payer: MEDICAID

## 2022-11-14 DIAGNOSIS — G40.909 SEIZURE DISORDER: ICD-10-CM

## 2022-11-14 DIAGNOSIS — G40.901 STATUS EPILEPTICUS: ICD-10-CM

## 2022-11-14 DIAGNOSIS — R56.9 SEIZURE: Primary | ICD-10-CM

## 2022-11-14 DIAGNOSIS — R07.9 CHEST PAIN: ICD-10-CM

## 2022-11-14 PROBLEM — D72.829 LEUKOCYTOSIS: Status: ACTIVE | Noted: 2022-11-14

## 2022-11-14 PROBLEM — E87.6 HYPOKALEMIA: Status: ACTIVE | Noted: 2022-11-14

## 2022-11-14 PROBLEM — F05 POST-ICTAL CONFUSION: Status: ACTIVE | Noted: 2022-11-14

## 2022-11-14 LAB
ALBUMIN SERPL BCP-MCNC: 4 G/DL (ref 3.5–5.2)
ALP SERPL-CCNC: 100 U/L (ref 55–135)
ALT SERPL W/O P-5'-P-CCNC: 20 U/L (ref 10–44)
AMPHET+METHAMPHET UR QL: NEGATIVE
ANION GAP SERPL CALC-SCNC: 15 MMOL/L (ref 8–16)
APTT PPP: 26.9 SEC (ref 23.3–35.1)
AST SERPL-CCNC: 26 U/L (ref 10–40)
B-HCG UR QL: NEGATIVE
BACTERIA #/AREA URNS HPF: ABNORMAL /HPF
BARBITURATES UR QL SCN>200 NG/ML: NEGATIVE
BASOPHILS # BLD AUTO: 0.09 K/UL (ref 0–0.2)
BASOPHILS NFR BLD: 0.5 % (ref 0–1.9)
BENZODIAZ UR QL SCN>200 NG/ML: NEGATIVE
BILIRUB SERPL-MCNC: 0.2 MG/DL (ref 0.1–1)
BILIRUB UR QL STRIP: NEGATIVE
BNP SERPL-MCNC: 23 PG/ML (ref 0–99)
BUN SERPL-MCNC: 18 MG/DL (ref 6–20)
BZE UR QL SCN: NEGATIVE
CALCIUM SERPL-MCNC: 8.6 MG/DL (ref 8.7–10.5)
CANNABINOIDS UR QL SCN: ABNORMAL
CHLORIDE SERPL-SCNC: 103 MMOL/L (ref 95–110)
CK SERPL-CCNC: 68 U/L (ref 20–180)
CLARITY UR: ABNORMAL
CO2 SERPL-SCNC: 18 MMOL/L (ref 23–29)
COLOR UR: YELLOW
CREAT SERPL-MCNC: 0.8 MG/DL (ref 0.5–1.4)
CREAT UR-MCNC: 148 MG/DL (ref 15–325)
CTP QC/QA: YES
DIFFERENTIAL METHOD: ABNORMAL
EOSINOPHIL # BLD AUTO: 0.1 K/UL (ref 0–0.5)
EOSINOPHIL NFR BLD: 0.6 % (ref 0–8)
ERYTHROCYTE [DISTWIDTH] IN BLOOD BY AUTOMATED COUNT: 12.4 % (ref 11.5–14.5)
EST. GFR  (NO RACE VARIABLE): >60 ML/MIN/1.73 M^2
GLUCOSE SERPL-MCNC: 108 MG/DL (ref 70–110)
GLUCOSE SERPL-MCNC: 97 MG/DL (ref 70–110)
GLUCOSE UR QL STRIP: NEGATIVE
HCT VFR BLD AUTO: 44.8 % (ref 37–48.5)
HGB BLD-MCNC: 14.1 G/DL (ref 12–16)
HGB UR QL STRIP: NEGATIVE
HYALINE CASTS #/AREA URNS LPF: 18 /LPF
IMM GRANULOCYTES # BLD AUTO: 0.09 K/UL (ref 0–0.04)
IMM GRANULOCYTES NFR BLD AUTO: 0.5 % (ref 0–0.5)
INR PPP: 1.2
KETONES UR QL STRIP: ABNORMAL
LEUKOCYTE ESTERASE UR QL STRIP: ABNORMAL
LIPASE SERPL-CCNC: 47 U/L (ref 4–60)
LYMPHOCYTES # BLD AUTO: 3.4 K/UL (ref 1–4.8)
LYMPHOCYTES NFR BLD: 19 % (ref 18–48)
MAGNESIUM SERPL-MCNC: 2.2 MG/DL (ref 1.6–2.6)
MCH RBC QN AUTO: 29 PG (ref 27–31)
MCHC RBC AUTO-ENTMCNC: 31.5 G/DL (ref 32–36)
MCV RBC AUTO: 92 FL (ref 82–98)
MICROSCOPIC COMMENT: ABNORMAL
MONOCYTES # BLD AUTO: 1.4 K/UL (ref 0.3–1)
MONOCYTES NFR BLD: 8 % (ref 4–15)
NEUTROPHILS # BLD AUTO: 12.6 K/UL (ref 1.8–7.7)
NEUTROPHILS NFR BLD: 71.4 % (ref 38–73)
NITRITE UR QL STRIP: NEGATIVE
NRBC BLD-RTO: 0 /100 WBC
OPIATES UR QL SCN: NEGATIVE
PCP UR QL SCN>25 NG/ML: NEGATIVE
PH UR STRIP: 6 [PH] (ref 5–8)
PLATELET # BLD AUTO: 451 K/UL (ref 150–450)
PMV BLD AUTO: 10.2 FL (ref 9.2–12.9)
POTASSIUM SERPL-SCNC: 3 MMOL/L (ref 3.5–5.1)
PROT SERPL-MCNC: 8.4 G/DL (ref 6–8.4)
PROT UR QL STRIP: ABNORMAL
PROTHROMBIN TIME: 14.3 SEC (ref 11.4–13.7)
RBC # BLD AUTO: 4.87 M/UL (ref 4–5.4)
RBC #/AREA URNS HPF: 1 /HPF (ref 0–4)
SODIUM SERPL-SCNC: 136 MMOL/L (ref 136–145)
SP GR UR STRIP: 1.03 (ref 1–1.03)
SQUAMOUS #/AREA URNS HPF: 8 /HPF
TOXICOLOGY INFORMATION: ABNORMAL
TROPONIN I SERPL DL<=0.01 NG/ML-MCNC: <0.03 NG/ML
TSH SERPL DL<=0.005 MIU/L-ACNC: 2.28 UIU/ML (ref 0.34–5.6)
URN SPEC COLLECT METH UR: ABNORMAL
UROBILINOGEN UR STRIP-ACNC: NEGATIVE EU/DL
WBC # BLD AUTO: 17.71 K/UL (ref 3.9–12.7)
WBC #/AREA URNS HPF: 28 /HPF (ref 0–5)

## 2022-11-14 PROCEDURE — 83735 ASSAY OF MAGNESIUM: CPT | Performed by: EMERGENCY MEDICINE

## 2022-11-14 PROCEDURE — 85610 PROTHROMBIN TIME: CPT | Performed by: EMERGENCY MEDICINE

## 2022-11-14 PROCEDURE — 96365 THER/PROPH/DIAG IV INF INIT: CPT

## 2022-11-14 PROCEDURE — 85730 THROMBOPLASTIN TIME PARTIAL: CPT | Performed by: EMERGENCY MEDICINE

## 2022-11-14 PROCEDURE — 25000003 PHARM REV CODE 250: Performed by: NURSE PRACTITIONER

## 2022-11-14 PROCEDURE — 96375 TX/PRO/DX INJ NEW DRUG ADDON: CPT

## 2022-11-14 PROCEDURE — 81001 URINALYSIS AUTO W/SCOPE: CPT | Performed by: EMERGENCY MEDICINE

## 2022-11-14 PROCEDURE — 83880 ASSAY OF NATRIURETIC PEPTIDE: CPT | Performed by: EMERGENCY MEDICINE

## 2022-11-14 PROCEDURE — G0378 HOSPITAL OBSERVATION PER HR: HCPCS

## 2022-11-14 PROCEDURE — 83690 ASSAY OF LIPASE: CPT | Performed by: EMERGENCY MEDICINE

## 2022-11-14 PROCEDURE — 99285 EMERGENCY DEPT VISIT HI MDM: CPT | Mod: 25

## 2022-11-14 PROCEDURE — 84484 ASSAY OF TROPONIN QUANT: CPT | Performed by: EMERGENCY MEDICINE

## 2022-11-14 PROCEDURE — 63600175 PHARM REV CODE 636 W HCPCS: Performed by: NURSE PRACTITIONER

## 2022-11-14 PROCEDURE — 84443 ASSAY THYROID STIM HORMONE: CPT | Performed by: EMERGENCY MEDICINE

## 2022-11-14 PROCEDURE — 80053 COMPREHEN METABOLIC PANEL: CPT | Performed by: EMERGENCY MEDICINE

## 2022-11-14 PROCEDURE — 93010 EKG 12-LEAD: ICD-10-PCS | Mod: ,,, | Performed by: SPECIALIST

## 2022-11-14 PROCEDURE — 80307 DRUG TEST PRSMV CHEM ANLYZR: CPT | Performed by: EMERGENCY MEDICINE

## 2022-11-14 PROCEDURE — 82550 ASSAY OF CK (CPK): CPT | Performed by: EMERGENCY MEDICINE

## 2022-11-14 PROCEDURE — 81025 URINE PREGNANCY TEST: CPT | Performed by: EMERGENCY MEDICINE

## 2022-11-14 PROCEDURE — 93005 ELECTROCARDIOGRAM TRACING: CPT | Performed by: SPECIALIST

## 2022-11-14 PROCEDURE — 85025 COMPLETE CBC W/AUTO DIFF WBC: CPT | Performed by: NURSE PRACTITIONER

## 2022-11-14 PROCEDURE — 25000003 PHARM REV CODE 250: Performed by: EMERGENCY MEDICINE

## 2022-11-14 PROCEDURE — 63600175 PHARM REV CODE 636 W HCPCS

## 2022-11-14 PROCEDURE — 93010 ELECTROCARDIOGRAM REPORT: CPT | Mod: ,,, | Performed by: SPECIALIST

## 2022-11-14 RX ORDER — NALOXONE HCL 0.4 MG/ML
0.02 VIAL (ML) INJECTION
Status: DISCONTINUED | OUTPATIENT
Start: 2022-11-14 | End: 2022-11-15 | Stop reason: HOSPADM

## 2022-11-14 RX ORDER — ONDANSETRON 2 MG/ML
4 INJECTION INTRAMUSCULAR; INTRAVENOUS EVERY 8 HOURS PRN
Status: DISCONTINUED | OUTPATIENT
Start: 2022-11-14 | End: 2022-11-15 | Stop reason: HOSPADM

## 2022-11-14 RX ORDER — SODIUM,POTASSIUM PHOSPHATES 280-250MG
2 POWDER IN PACKET (EA) ORAL
Status: DISCONTINUED | OUTPATIENT
Start: 2022-11-14 | End: 2022-11-15 | Stop reason: HOSPADM

## 2022-11-14 RX ORDER — LEVETIRACETAM 500 MG/1
1000 TABLET ORAL 2 TIMES DAILY
Status: DISCONTINUED | OUTPATIENT
Start: 2022-11-14 | End: 2022-11-15 | Stop reason: HOSPADM

## 2022-11-14 RX ORDER — LORAZEPAM 2 MG/ML
1 INJECTION INTRAMUSCULAR
Status: COMPLETED | OUTPATIENT
Start: 2022-11-14 | End: 2022-11-14

## 2022-11-14 RX ORDER — LORAZEPAM 2 MG/ML
INJECTION INTRAMUSCULAR
Status: COMPLETED
Start: 2022-11-14 | End: 2022-11-14

## 2022-11-14 RX ORDER — DOCUSATE SODIUM 100 MG/1
100 CAPSULE, LIQUID FILLED ORAL 2 TIMES DAILY
Status: DISCONTINUED | OUTPATIENT
Start: 2022-11-14 | End: 2022-11-15 | Stop reason: HOSPADM

## 2022-11-14 RX ORDER — NORETHINDRONE ACETATE AND ETHINYL ESTRADIOL AND FERROUS FUMARATE 1MG-20(21)
1 KIT ORAL DAILY
COMMUNITY
Start: 2022-10-05

## 2022-11-14 RX ORDER — GLUCAGON 1 MG
1 KIT INJECTION
Status: DISCONTINUED | OUTPATIENT
Start: 2022-11-14 | End: 2022-11-15 | Stop reason: HOSPADM

## 2022-11-14 RX ORDER — LACOSAMIDE 50 MG/1
200 TABLET ORAL EVERY 12 HOURS
Status: DISCONTINUED | OUTPATIENT
Start: 2022-11-14 | End: 2022-11-15 | Stop reason: HOSPADM

## 2022-11-14 RX ORDER — LANOLIN ALCOHOL/MO/W.PET/CERES
800 CREAM (GRAM) TOPICAL
Status: DISCONTINUED | OUTPATIENT
Start: 2022-11-14 | End: 2022-11-15 | Stop reason: HOSPADM

## 2022-11-14 RX ORDER — CLINDAMYCIN HYDROCHLORIDE 150 MG/1
300 CAPSULE ORAL EVERY 6 HOURS
Status: DISCONTINUED | OUTPATIENT
Start: 2022-11-15 | End: 2022-11-15 | Stop reason: HOSPADM

## 2022-11-14 RX ORDER — IBUPROFEN 200 MG
16 TABLET ORAL
Status: DISCONTINUED | OUTPATIENT
Start: 2022-11-14 | End: 2022-11-15 | Stop reason: HOSPADM

## 2022-11-14 RX ORDER — LACOSAMIDE 50 MG/1
200 TABLET ORAL EVERY 12 HOURS
Status: DISCONTINUED | OUTPATIENT
Start: 2022-11-14 | End: 2022-11-14

## 2022-11-14 RX ORDER — LORAZEPAM 2 MG/ML
2 INJECTION INTRAMUSCULAR EVERY 4 HOURS PRN
Status: DISCONTINUED | OUTPATIENT
Start: 2022-11-14 | End: 2022-11-15 | Stop reason: HOSPADM

## 2022-11-14 RX ORDER — SODIUM CHLORIDE 0.9 % (FLUSH) 0.9 %
10 SYRINGE (ML) INJECTION
Status: DISCONTINUED | OUTPATIENT
Start: 2022-11-14 | End: 2022-11-15 | Stop reason: HOSPADM

## 2022-11-14 RX ORDER — IBUPROFEN 200 MG
24 TABLET ORAL
Status: DISCONTINUED | OUTPATIENT
Start: 2022-11-14 | End: 2022-11-15 | Stop reason: HOSPADM

## 2022-11-14 RX ADMIN — LORAZEPAM 1 MG: 2 INJECTION INTRAMUSCULAR; INTRAVENOUS at 02:11

## 2022-11-14 RX ADMIN — DOCUSATE SODIUM 100 MG: 100 CAPSULE, LIQUID FILLED ORAL at 08:11

## 2022-11-14 RX ADMIN — LORAZEPAM 1 MG: 2 INJECTION INTRAMUSCULAR at 02:11

## 2022-11-14 RX ADMIN — CEFTRIAXONE SODIUM 1 G: 1 INJECTION, POWDER, FOR SOLUTION INTRAMUSCULAR; INTRAVENOUS at 10:11

## 2022-11-14 RX ADMIN — SODIUM CHLORIDE 250 ML: 0.9 INJECTION, SOLUTION INTRAVENOUS at 05:11

## 2022-11-14 RX ADMIN — LACOSAMIDE 200 MG: 50 TABLET, FILM COATED ORAL at 08:11

## 2022-11-14 RX ADMIN — POTASSIUM BICARBONATE 40 MEQ: 391 TABLET, EFFERVESCENT ORAL at 04:11

## 2022-11-14 RX ADMIN — LEVETIRACETAM 1000 MG: 500 TABLET, FILM COATED ORAL at 08:11

## 2022-11-14 NOTE — ED NOTES
Pt noted to be having active seizure, Md Dietz at bedside. Pt stable, seizure pads on rails, bed low, call light in reach. Pt able to voice needs.

## 2022-11-14 NOTE — HPI
Syeda Gonzalez is a 27 y.o. female with a history as  has a past medical history of Asthma, Depression affecting pregnancy, IUD (intrauterine device) in place, Mental disorder, and Seizures. who presented to the ED with a Seizures (Hx of seizures, out of vimpat for 3 days, mom was going to  today. Pt had unwitnessed seizure like activity today for an unknown amount of time. Was in bed, no apparent trauma. Pt denies pain. Was postictal on EMS arrival, CBG-103, VSS w EMS (slightly tachy). )    Patient presented to the emergency room via EMS for evaluation of seizure.  Patient reports she was at home going about her usual day with the seizure occurred.  She reports she was lying down and thought she had just fallen asleep.  Cousin walked in the room and noticed her to be having a seizure and activated EMS.      Patient reports she is on Keppra and Vimpat.  She further states she's been out of her Vimpat for few days.  She reports having a 2nd seizure in the emergency room.  Denies falling or hitting head.  Denies recent illness.     Lab and imaging obtained and reviewed.   CBC shows WBCs 17.71 MCHC 31.5 PLTs 451. PT/INR 14.3/1.2. CMP shows K+ 3.0 CO2 18 Ca+ 8.6. CE within normal range. EKG shows ST rate, nonischemic. CXR without acute cardiopulmonary findings. UDS +marijuana. UA with 1+ protein 1+ ketones 3+ leukocytes 28 WBCs occ bacteria and 18 hyaline cast. CT head without acute intracranial findings.  On admit, afebrile, /72  sats 99%  on RA.       Per ED provider, patient with a history of seizures, last seizure prior to this event July 31, 2022, apparently patient was out of medication x3 days, with seizure this a.m. at home again in EMS and again in ER, she was given 1 mg Ativan and Vimpat 200 mg x1 dose, with prolonged postictal state, confused but without any neuro focal deficits.  CT head was completed and nega for additional dose of Vimpat 200 mg and restart home medications.  Family  at bedside reports patient not at baseline mentation, will admit to hospital overnight monitoring.  If she ca remain stable can likely disposition in a.m.

## 2022-11-14 NOTE — ED PROVIDER NOTES
Encounter Date: 11/14/2022       History     Chief Complaint   Patient presents with    Seizures     Hx of seizures, out of vimpat for 3 days, mom was going to  today. Pt had unwitnessed seizure like activity today for an unknown amount of time. Was in bed, no apparent trauma. Pt denies pain. Was postictal on EMS arrival, CBG-103, VSS w EMS (slightly tachy).      27-year-old female with history of seizure disorder on Vimpat and Keppra presents after seizure activity witnessed at home by a family member.  Shortly after coming to the emergency room the patient had another tonic-clonic grand mal seizure that stopped spontaneously.  The patient had a prolonged postictal state.  She is still somewhat postictal on obtaining history.  She reports that she ran out of Vimpat approximately 3 days ago.  She reports she has been taking her Keppra.  She denies any headaches or head trauma.  She denies any visual changes.  She denies any focal weakness numbness tingling or paresthesias.  She denies any recent illnesses.  She denies chest pain or shortness of breath.  She denies abdominal pain, nausea vomiting or diarrhea.  She denies any dysuria, frequency, urgency or any urinary problems or changes.  Her last seizure activity was on July 31st.  She denies any other problems or complaints.  Accu-Chek in the emergency room noted to be 100.       Review of patient's allergies indicates:   Allergen Reactions    Benadryl [diphenhydramine hcl] Other (See Comments)     Lowers seizure threshold.    Quinolones Other (See Comments)     Lowers seizure threshold.     Tramadol Other (See Comments)     seizures     Past Medical History:   Diagnosis Date    Asthma     Depression affecting pregnancy     IUD (intrauterine device) in place     placed today 09/10/18    Mental disorder     Seizures     Epilepsy     Past Surgical History:   Procedure Laterality Date    ADENOIDECTOMY W/ MYRINGOTOMY AND TUBES      CHOLECYSTECTOMY       Family  History   Problem Relation Age of Onset    Heart disease Father     Heart disease Maternal Grandfather     Heart disease Paternal Grandmother     Heart disease Paternal Grandfather     Congenital heart disease Neg Hx     Cardiomyopathy Neg Hx     Arrhythmia Neg Hx     Hypertension Neg Hx     Pacemaker/defibrilator Neg Hx      Social History     Tobacco Use    Smoking status: Never    Smokeless tobacco: Never   Substance Use Topics    Alcohol use: Yes     Comment: occasionally    Drug use: No     Review of Systems   Constitutional: Negative.  Negative for activity change, appetite change, chills, fatigue and fever.   HENT: Negative.  Negative for congestion, dental problem, ear pain, rhinorrhea, sinus pressure, sinus pain, sore throat and trouble swallowing.    Eyes: Negative.  Negative for photophobia, pain, redness and visual disturbance.   Respiratory: Negative.  Negative for cough, chest tightness, shortness of breath and wheezing.    Cardiovascular: Negative.  Negative for chest pain, palpitations and leg swelling.   Gastrointestinal: Negative.  Negative for abdominal distention, abdominal pain, anal bleeding, blood in stool, constipation, diarrhea, nausea and vomiting.   Endocrine: Negative.    Genitourinary: Negative.  Negative for decreased urine volume, difficulty urinating, dysuria, flank pain, frequency, hematuria, pelvic pain and urgency.   Musculoskeletal: Negative.  Negative for arthralgias, back pain, gait problem, joint swelling, myalgias, neck pain and neck stiffness.   Skin: Negative.  Negative for color change, pallor and rash.   Neurological:  Positive for seizures. Negative for dizziness, tremors, syncope, facial asymmetry, speech difficulty, weakness, light-headedness, numbness and headaches.   Hematological: Negative.  Does not bruise/bleed easily.   Psychiatric/Behavioral: Negative.  Negative for confusion.    All other systems reviewed and are negative.    Physical Exam     Initial Vitals  [11/14/22 1349]   BP Pulse Resp Temp SpO2   132/72 (!) 122 18 98.4 °F (36.9 °C) 99 %      MAP       --         Physical Exam    Nursing note and vitals reviewed.  Constitutional: She appears listless. She is not diaphoretic. She is active and cooperative.  Non-toxic appearance. She does not have a sickly appearance. She does not appear ill. No distress.   HENT:   Head: Normocephalic and atraumatic.   Right Ear: Tympanic membrane normal.   Left Ear: Tympanic membrane normal.   Nose: Nose normal.   Mouth/Throat: Uvula is midline, oropharynx is clear and moist and mucous membranes are normal. No oral lesions. No uvula swelling. No oropharyngeal exudate, posterior oropharyngeal edema or posterior oropharyngeal erythema.   Eyes: Conjunctivae, EOM and lids are normal. Pupils are equal, round, and reactive to light. No scleral icterus.   Neck: Trachea normal and phonation normal. Neck supple. No thyroid mass and no thyromegaly present. No stridor present. No JVD present.   Normal range of motion.   Full passive range of motion without pain.     Cardiovascular:  Normal rate, regular rhythm, normal heart sounds, intact distal pulses and normal pulses.     Exam reveals no gallop, no distant heart sounds and no friction rub.       No murmur heard.  Pulmonary/Chest: Effort normal and breath sounds normal. No accessory muscle usage or stridor. No tachypnea. No respiratory distress. She has no wheezes. She has no rhonchi. She has no rales.   Abdominal: Abdomen is soft. Bowel sounds are normal. She exhibits no distension, no pulsatile midline mass and no mass. There is no abdominal tenderness. There is no rigidity and no guarding.   Musculoskeletal:         General: No tenderness or edema. Normal range of motion.      Right hand: Normal. Normal range of motion. Normal strength. Normal sensation. Normal capillary refill. Normal pulse.      Left hand: Normal. Normal range of motion. Normal strength. Normal sensation. Normal  capillary refill. Normal pulse.      Cervical back: Normal, full passive range of motion without pain, normal range of motion and neck supple. No edema, erythema, rigidity or bony tenderness. No spinous process tenderness or muscular tenderness. Normal range of motion.      Thoracic back: Normal. No bony tenderness. Normal range of motion.      Lumbar back: Normal. No bony tenderness. Normal range of motion.      Right foot: Normal. Normal range of motion and normal capillary refill. No tenderness or bony tenderness. Normal pulse.      Left foot: Normal. Normal range of motion and normal capillary refill. No tenderness or bony tenderness. Normal pulse.      Comments: Pulses are 2+ throughout, cap refill is less than 2 sec throughout, extremities are nontender throughout with full range of motion. There is no spinal tenderness to palpation.     Neurological: She has normal strength. She appears listless. She displays normal reflexes. No cranial nerve deficit or sensory deficit. Gait normal.   Slow to answer questions, oriented to person and place and year but did not know the day.  She did know the president.   Skin: Skin is warm, dry and intact. Capillary refill takes less than 2 seconds. No ecchymosis, no petechiae and no rash noted. No erythema. No pallor.   Psychiatric: She has a normal mood and affect. Her speech is normal and behavior is normal. Judgment and thought content normal. Cognition and memory are normal.       ED Course   Procedures  Labs Reviewed   CBC W/ AUTO DIFFERENTIAL - Abnormal; Notable for the following components:       Result Value    WBC 17.71 (*)     MCHC 31.5 (*)     Platelets 451 (*)     Gran # (ANC) 12.6 (*)     Immature Grans (Abs) 0.09 (*)     Mono # 1.4 (*)     All other components within normal limits   COMPREHENSIVE METABOLIC PANEL - Abnormal; Notable for the following components:    Potassium 3.0 (*)     CO2 18 (*)     Calcium 8.6 (*)     All other components within normal limits    DRUG SCREEN PANEL, URINE EMERGENCY - Abnormal; Notable for the following components:    THC Presumptive Positive (*)     All other components within normal limits    Narrative:     Specimen Source->Urine  Collection Type->Urine, Clean Catch   PROTIME-INR - Abnormal; Notable for the following components:    PT 14.3 (*)     All other components within normal limits   URINALYSIS - Abnormal; Notable for the following components:    Appearance, UA Hazy (*)     Protein, UA 1+ (*)     Ketones, UA 1+ (*)     Leukocytes, UA 3+ (*)     All other components within normal limits    Narrative:     Specimen Source->Urine  Collection Type->Urine, Clean Catch   URINALYSIS MICROSCOPIC - Abnormal; Notable for the following components:    WBC, UA 28 (*)     Hyaline Casts, UA 18 (*)     All other components within normal limits    Narrative:     Specimen Source->Urine  Collection Type->Urine, Clean Catch   APTT   B-TYPE NATRIURETIC PEPTIDE   LIPASE   MAGNESIUM   TROPONIN I   TSH   CK   POCT URINE PREGNANCY   POCT GLUCOSE        ECG Results              EKG 12-lead (In process)  Result time 11/14/22 14:41:02      In process by Interface, Lab In [facility] (11/14/22 14:41:02)                   Narrative:    Test Reason : R56.9,    Vent. Rate : 136 BPM     Atrial Rate : 136 BPM     P-R Int : 134 ms          QRS Dur : 086 ms      QT Int : 302 ms       P-R-T Axes : 117 056 216 degrees     QTc Int : 454 ms    Sinus tachycardia  Septal infarct ,age undetermined  ST and T wave abnormality, consider inferior ischemia  ST and T wave abnormality, consider anterolateral ischemia  Abnormal ECG  When compared with ECG of 04-FEB-2022 17:26,  T wave inversion now evident in Inferior leads  T wave inversion now evident in Anterior-lateral leads    Referred By: [email or web address]   SELF           Confirmed By:                                   Imaging Results              CT Head Without Contrast (Final result)  Result time 11/14/22 15:50:38      Final result by  José Miguel Scott MD (11/14/22 15:50:38)                   Narrative:    CMS MANDATED QUALITY DATA-CT RADIATION DOSE-436  All CT scans at this facility dose modulation, iterative reconstruction, and or weight-based dosing when appropriate to reduce radiation dose to as low as reasonably achievable.    HISTORY: Seizure disorder, clinical change    FINDINGS: Comparison to multiple prior exams. There is no acute intracranial hemorrhage, with no mass effect or abnormal extra-axial fluid. Gray white differentiation is maintained, with the cortical sulci, ventricles and basal cisterns normal in size for the patient's age.    The cerebellum and brainstem are unremarkable. There is mild left maxillary sinus mucosal thickening, with the remaining paranasal sinuses and mastoid air cells clear. There is no acute osseous abnormality.    IMPRESSION: Negative noncontrast head CT.    Electronically signed by:  José Miguel Scott MD  11/14/2022 3:50 PM CST Workstation: [id number]                                     X-Ray Chest 1 View (Final result)  Result time 11/14/22 14:35:30      Final result by Cezar Moffett MD (11/14/22 14:35:30)                   Narrative:    Reason: History seizures.    FINDINGS:    PA and lateral chest without comparisons show normal cardiomediastinal silhouette.  Lungs are clear. Pulmonary vasculature is normal. No acute osseous abnormality.    IMPRESSION:    No acute cardiopulmonary abnormality.    Electronically signed by:  Cezar Moffett DO  11/14/2022 2:35 PM CST Workstation: [id number]                                     Medications   LORazepam injection 1 mg (1 mg Intravenous Given 11/14/22 1419)   potassium bicarbonate disintegrating tablet 40 mEq (40 mEq Oral Given 11/14/22 1609)   sodium chloride 0.9% bolus 250 mL (250 mLs Intravenous New Bag 11/14/22 1758)   cefTRIAXone (ROCEPHIN) 1 g/50 mL D5W IVPB (0 g Intravenous Stopped 11/14/22 2240)     Medical Decision Making:   Clinical Tests:   Lab Tests:  Reviewed  Radiological Study: Reviewed  Medical Tests: Reviewed  ED Management:  Patient has had 2-3 seizures today, noncompliant with Vimpat over the past several days.  Has had a prolonged postictal state.  Is still currently mildly confused although has not had repeat seizure activity.  200 mg of oral Vimpat given.  I have discussed the case with Dr. Celestin on-call for Neurology, secondary to prolonged postictal state will consult hospitalist for admission.  He also recommends an additional 200 mg of oral Vimpat.                        Clinical Impression:   Final diagnoses:  [R56.9] Seizure (Primary)  [G40.901] Status epilepticus        ED Disposition Condition    Observation                 Rosibel Dietz MD  11/15/22 3435

## 2022-11-15 VITALS
OXYGEN SATURATION: 100 % | WEIGHT: 232.38 LBS | DIASTOLIC BLOOD PRESSURE: 85 MMHG | TEMPERATURE: 98 F | HEIGHT: 63 IN | BODY MASS INDEX: 41.18 KG/M2 | SYSTOLIC BLOOD PRESSURE: 138 MMHG | RESPIRATION RATE: 20 BRPM | HEART RATE: 81 BPM

## 2022-11-15 PROBLEM — R56.9 SEIZURE: Status: RESOLVED | Noted: 2018-08-22 | Resolved: 2022-11-15

## 2022-11-15 PROBLEM — D72.829 LEUKOCYTOSIS: Status: RESOLVED | Noted: 2022-11-14 | Resolved: 2022-11-15

## 2022-11-15 PROBLEM — F05 POST-ICTAL CONFUSION: Status: RESOLVED | Noted: 2022-11-14 | Resolved: 2022-11-15

## 2022-11-15 PROBLEM — E87.6 HYPOKALEMIA: Status: RESOLVED | Noted: 2022-11-14 | Resolved: 2022-11-15

## 2022-11-15 LAB
ANION GAP SERPL CALC-SCNC: 6 MMOL/L (ref 8–16)
BASOPHILS # BLD AUTO: 0.05 K/UL (ref 0–0.2)
BASOPHILS NFR BLD: 0.6 % (ref 0–1.9)
BUN SERPL-MCNC: 17 MG/DL (ref 6–20)
CALCIUM SERPL-MCNC: 8.3 MG/DL (ref 8.7–10.5)
CHLORIDE SERPL-SCNC: 108 MMOL/L (ref 95–110)
CO2 SERPL-SCNC: 24 MMOL/L (ref 23–29)
CREAT SERPL-MCNC: 0.7 MG/DL (ref 0.5–1.4)
DIFFERENTIAL METHOD: NORMAL
EOSINOPHIL # BLD AUTO: 0.1 K/UL (ref 0–0.5)
EOSINOPHIL NFR BLD: 0.7 % (ref 0–8)
ERYTHROCYTE [DISTWIDTH] IN BLOOD BY AUTOMATED COUNT: 12.4 % (ref 11.5–14.5)
EST. GFR  (NO RACE VARIABLE): >60 ML/MIN/1.73 M^2
GLUCOSE SERPL-MCNC: 93 MG/DL (ref 70–110)
HCT VFR BLD AUTO: 39 % (ref 37–48.5)
HGB BLD-MCNC: 12.6 G/DL (ref 12–16)
IMM GRANULOCYTES # BLD AUTO: 0.02 K/UL (ref 0–0.04)
IMM GRANULOCYTES NFR BLD AUTO: 0.2 % (ref 0–0.5)
LYMPHOCYTES # BLD AUTO: 3 K/UL (ref 1–4.8)
LYMPHOCYTES NFR BLD: 36.2 % (ref 18–48)
MCH RBC QN AUTO: 28.6 PG (ref 27–31)
MCHC RBC AUTO-ENTMCNC: 32.3 G/DL (ref 32–36)
MCV RBC AUTO: 89 FL (ref 82–98)
MONOCYTES # BLD AUTO: 0.7 K/UL (ref 0.3–1)
MONOCYTES NFR BLD: 8.5 % (ref 4–15)
NEUTROPHILS # BLD AUTO: 4.5 K/UL (ref 1.8–7.7)
NEUTROPHILS NFR BLD: 53.8 % (ref 38–73)
NRBC BLD-RTO: 0 /100 WBC
PLATELET # BLD AUTO: 344 K/UL (ref 150–450)
PMV BLD AUTO: 9.9 FL (ref 9.2–12.9)
POTASSIUM SERPL-SCNC: 3.7 MMOL/L (ref 3.5–5.1)
RBC # BLD AUTO: 4.4 M/UL (ref 4–5.4)
SODIUM SERPL-SCNC: 138 MMOL/L (ref 136–145)
WBC # BLD AUTO: 8.32 K/UL (ref 3.9–12.7)

## 2022-11-15 PROCEDURE — 80048 BASIC METABOLIC PNL TOTAL CA: CPT | Performed by: NURSE PRACTITIONER

## 2022-11-15 PROCEDURE — 25000003 PHARM REV CODE 250: Performed by: NURSE PRACTITIONER

## 2022-11-15 PROCEDURE — 85025 COMPLETE CBC W/AUTO DIFF WBC: CPT | Performed by: NURSE PRACTITIONER

## 2022-11-15 PROCEDURE — G0378 HOSPITAL OBSERVATION PER HR: HCPCS

## 2022-11-15 PROCEDURE — 36415 COLL VENOUS BLD VENIPUNCTURE: CPT | Performed by: NURSE PRACTITIONER

## 2022-11-15 RX ORDER — LACOSAMIDE 200 MG/1
200 TABLET ORAL EVERY 12 HOURS
Qty: 60 TABLET | Refills: 0 | Status: SHIPPED | OUTPATIENT
Start: 2022-11-15 | End: 2022-12-07

## 2022-11-15 RX ORDER — LEVETIRACETAM 1000 MG/1
1000 TABLET ORAL 2 TIMES DAILY
Qty: 60 TABLET | Refills: 0 | Status: SHIPPED | OUTPATIENT
Start: 2022-11-15 | End: 2022-12-15

## 2022-11-15 RX ADMIN — LACOSAMIDE 200 MG: 50 TABLET, FILM COATED ORAL at 09:11

## 2022-11-15 RX ADMIN — LEVETIRACETAM 1000 MG: 500 TABLET, FILM COATED ORAL at 09:11

## 2022-11-15 RX ADMIN — CLINDAMYCIN HYDROCHLORIDE 300 MG: 150 CAPSULE ORAL at 12:11

## 2022-11-15 RX ADMIN — POTASSIUM BICARBONATE 50 MEQ: 977.5 TABLET, EFFERVESCENT ORAL at 06:11

## 2022-11-15 RX ADMIN — CLINDAMYCIN HYDROCHLORIDE 300 MG: 150 CAPSULE ORAL at 06:11

## 2022-11-15 NOTE — ASSESSMENT & PLAN NOTE
Seen in ED on 11/11/2022 for dental pain and started on NSAIDs and oral ABX  Will resume as ordered

## 2022-11-15 NOTE — PLAN OF CARE
Problem: Adult Inpatient Plan of Care  Goal: Plan of Care Review  Outcome: Met  Goal: Patient-Specific Goal (Individualized)  Outcome: Met  Goal: Absence of Hospital-Acquired Illness or Injury  Outcome: Met  Goal: Optimal Comfort and Wellbeing  Outcome: Met  Goal: Readiness for Transition of Care  Outcome: Met     Problem: Bariatric Environmental Safety  Goal: Safety Maintained with Care  Outcome: Met

## 2022-11-15 NOTE — SUBJECTIVE & OBJECTIVE
Past Medical History:   Diagnosis Date    Asthma     Depression affecting pregnancy     IUD (intrauterine device) in place     placed today 09/10/18    Mental disorder     Seizures     Epilepsy       Past Surgical History:   Procedure Laterality Date    ADENOIDECTOMY W/ MYRINGOTOMY AND TUBES      CHOLECYSTECTOMY         Review of patient's allergies indicates:   Allergen Reactions    Benadryl [diphenhydramine hcl] Other (See Comments)     Lowers seizure threshold.    Quinolones Other (See Comments)     Lowers seizure threshold.     Tramadol Other (See Comments)     seizures       No current facility-administered medications on file prior to encounter.     Current Outpatient Medications on File Prior to Encounter   Medication Sig    clindamycin (CLEOCIN) 300 MG capsule Take 1 capsule (300 mg total) by mouth every 6 (six) hours. for 10 days    diclofenac (VOLTAREN) 50 MG EC tablet Take 1 tablet (50 mg total) by mouth 3 (three) times daily as needed.    estradioL (ESTRACE) 2 MG tablet Take 2 mg by mouth once daily.    FLUZONE QUAD 0998-2494, PF, 60 mcg (15 mcg x 4)/0.5 mL Syrg ADM 0.5ML IM UTD    JUNEL FE 1/20, 28, 1 mg-20 mcg (21)/75 mg (7) per tablet Take 1 tablet by mouth once daily.    lacosamide (VIMPAT) 200 mg Tab tablet Take 1 tablet (200 mg total) by mouth every 12 (twelve) hours.    levETIRAcetam (KEPPRA) 1000 MG tablet Take 1 tablet (1,000 mg total) by mouth 2 (two) times daily.    NEXPLANON 68 mg Impl subdermal device 68 mg by Subdermal route once.     Family History       Problem Relation (Age of Onset)    Heart disease Father, Maternal Grandfather, Paternal Grandmother, Paternal Grandfather          Tobacco Use    Smoking status: Never    Smokeless tobacco: Never   Substance and Sexual Activity    Alcohol use: Yes     Comment: occasionally    Drug use: No    Sexual activity: Not Currently     Partners: Male     Birth control/protection: None     Comment:       Review of Systems   Constitutional:  Negative  for chills, diaphoresis and fever.   HENT:  Negative for congestion, postnasal drip, sinus pressure and sore throat.    Eyes:  Negative for visual disturbance.   Respiratory:  Negative for cough, chest tightness, shortness of breath and wheezing.    Cardiovascular:  Negative for chest pain, palpitations and leg swelling.   Gastrointestinal:  Negative for abdominal distention, abdominal pain, blood in stool, constipation, diarrhea, nausea and vomiting.   Endocrine: Negative.    Genitourinary:  Negative for dysuria.   Musculoskeletal: Negative.    Skin: Negative.    Allergic/Immunologic: Negative.    Neurological:  Positive for seizures. Negative for dizziness, weakness, numbness and headaches.   Hematological: Negative.    Psychiatric/Behavioral: Negative.     Objective:     Vital Signs (Most Recent):  Temp: 98.4 °F (36.9 °C) (11/14/22 1349)  Pulse: (!) 116 (11/14/22 1641)  Resp: (!) 25 (11/14/22 1500)  BP: 126/80 (11/14/22 1641)  SpO2: 100 % (11/14/22 1641)   Vital Signs (24h Range):  Temp:  [98.4 °F (36.9 °C)] 98.4 °F (36.9 °C)  Pulse:  [115-141] 116  Resp:  [18-26] 25  SpO2:  [99 %-100 %] 100 %  BP: (126-142)/(72-83) 126/80     Weight: 99.8 kg (220 lb)  Body mass index is 38.97 kg/m².    Physical Exam  Constitutional:       General: She is not in acute distress.     Appearance: Normal appearance. She is well-developed. She is not toxic-appearing or diaphoretic.   HENT:      Head: Normocephalic and atraumatic.   Eyes:      General: Lids are normal.      Conjunctiva/sclera: Conjunctivae normal.      Pupils: Pupils are equal, round, and reactive to light.   Neck:      Thyroid: No thyroid mass or thyromegaly.      Vascular: Normal carotid pulses. No JVD.      Trachea: Trachea normal. No tracheal deviation.   Cardiovascular:      Rate and Rhythm: Normal rate and regular rhythm.      Pulses: Normal pulses.      Heart sounds: Normal heart sounds, S1 normal and S2 normal.   Pulmonary:      Effort: Pulmonary effort is  normal.      Breath sounds: Normal breath sounds. No stridor.   Abdominal:      General: Bowel sounds are normal.      Palpations: Abdomen is soft.      Tenderness: There is no abdominal tenderness.   Musculoskeletal:         General: Normal range of motion.      Cervical back: Full passive range of motion without pain, normal range of motion and neck supple.   Skin:     General: Skin is warm and dry.      Nails: There is no clubbing.   Neurological:      Mental Status: She is alert and oriented to person, place, and time.      Cranial Nerves: No cranial nerve deficit.      Sensory: No sensory deficit.   Psychiatric:         Speech: Speech normal.         Behavior: Behavior normal. Behavior is cooperative.         Thought Content: Thought content normal.         Judgment: Judgment normal.         CRANIAL NERVES     CN III, IV, VI   Pupils are equal, round, and reactive to light.     Significant Labs: All pertinent labs within the past 24 hours have been reviewed.  Bilirubin:   Recent Labs   Lab 11/14/22  1420   BILITOT 0.2     BMP:   Recent Labs   Lab 11/14/22  1420   GLU 97      K 3.0*      CO2 18*   BUN 18   CREATININE 0.8   CALCIUM 8.6*   MG 2.2     CBC:   Recent Labs   Lab 11/14/22  1408   WBC 17.71*   HGB 14.1   HCT 44.8   *     CMP:   Recent Labs   Lab 11/14/22  1420      K 3.0*      CO2 18*   GLU 97   BUN 18   CREATININE 0.8   CALCIUM 8.6*   PROT 8.4   ALBUMIN 4.0   BILITOT 0.2   ALKPHOS 100   AST 26   ALT 20   ANIONGAP 15     Cardiac Markers:   Recent Labs   Lab 11/14/22  1420   BNP 23     Coagulation:   Recent Labs   Lab 11/14/22  1420   INR 1.2   APTT 26.9     Lipase:   Recent Labs   Lab 11/14/22  1420   LIPASE 47       Magnesium:   Recent Labs   Lab 11/14/22  1420   MG 2.2     Troponin:   Recent Labs   Lab 11/14/22  1420   TROPONINI <0.030     TSH:   Recent Labs   Lab 11/14/22  1420   TSH 2.280   Urine Studies:   Recent Labs   Lab 11/14/22  1510   COLORU Yellow    APPEARANCEUA Hazy*   PHUR 6.0   SPECGRAV 1.030   PROTEINUA 1+*   GLUCUA Negative   KETONESU 1+*   BILIRUBINUA Negative   OCCULTUA Negative   NITRITE Negative   UROBILINOGEN Negative   LEUKOCYTESUR 3+*   RBCUA 1   WBCUA 28*   BACTERIA Occasional   SQUAMEPITHEL 8   HYALINECASTS 18*       Significant Imaging: I have reviewed all pertinent imaging results/findings within the past 24 hours.  X-Ray Chest 1 View    Result Date: 11/14/2022  Reason: History seizures. FINDINGS: PA and lateral chest without comparisons show normal cardiomediastinal silhouette. Lungs are clear. Pulmonary vasculature is normal. No acute osseous abnormality. IMPRESSION: No acute cardiopulmonary abnormality. Electronically signed by:  Cezar Moffett DO  11/14/2022 2:35 PM CST Workstation: [id number]    CT Head Without Contrast    Result Date: 11/14/2022  CMS MANDATED QUALITY DATA-CT RADIATION DOSE-436 All CT scans at this facility dose modulation, iterative reconstruction, and or weight-based dosing when appropriate to reduce radiation dose to as low as reasonably achievable. HISTORY: Seizure disorder, clinical change FINDINGS: Comparison to multiple prior exams. There is no acute intracranial hemorrhage, with no mass effect or abnormal extra-axial fluid. Gray white differentiation is maintained, with the cortical sulci, ventricles and basal cisterns normal in size for the patient's age. The cerebellum and brainstem are unremarkable. There is mild left maxillary sinus mucosal thickening, with the remaining paranasal sinuses and mastoid air cells clear. There is no acute osseous abnormality. IMPRESSION: Negative noncontrast head CT. Electronically signed by:  José Miguel Scott MD  11/14/2022 3:50 PM CST Workstation: [id number]

## 2022-11-15 NOTE — H&P
[facility] - Emergency Dept  Hospital Medicine  History & Physical    Patient Name: Syeda Gonzalez  MRN: [id number]  Patient Class: OP- Observation  Admission Date: 11/14/2022  Attending Physician: Kami Mckeon MD   Primary Care Provider: Drake Ruff Jr, MD         Patient information was obtained from patient and ER records.     Subjective:     Principal Problem:Seizure    Chief Complaint:   Chief Complaint   Patient presents with    Seizures     Hx of seizures, out of vimpat for 3 days, mom was going to  today. Pt had unwitnessed seizure like activity today for an unknown amount of time. Was in bed, no apparent trauma. Pt denies pain. Was postictal on EMS arrival, CBG-103, VSS w EMS (slightly tachy).         HPI: Syeda Gonzalez is a 27 y.o. female with a history as  has a past medical history of Asthma, Depression affecting pregnancy, IUD (intrauterine device) in place, Mental disorder, and Seizures. who presented to the ED with a Seizures (Hx of seizures, out of vimpat for 3 days, mom was going to  today. Pt had unwitnessed seizure like activity today for an unknown amount of time. Was in bed, no apparent trauma. Pt denies pain. Was postictal on EMS arrival, CBG-103, VSS w EMS (slightly tachy). )    Patient presented to the emergency room via EMS for evaluation of seizure.  Patient reports she was at home going about her usual day with the seizure occurred.  She reports she was lying down and thought she had just fallen asleep.  Cousin walked in the room and noticed her to be having a seizure and activated EMS.      Patient reports she is on Keppra and Vimpat.  She further states she's been out of her Vimpat for few days.  She reports having a 2nd seizure in the emergency room.  Denies falling or hitting head.  Denies recent illness.     Lab and imaging obtained and reviewed.   CBC shows WBCs 17.71 MCHC 31.5 PLTs 451. PT/INR 14.3/1.2. CMP shows K+ 3.0 CO2 18 Ca+ 8.6.  CE within normal range. EKG shows ST rate, nonischemic. CXR without acute cardiopulmonary findings. UDS +marijuana. UA with 1+ protein 1+ ketones 3+ leukocytes 28 WBCs occ bacteria and 18 hyaline cast. CT head without acute intracranial findings.  On admit, afebrile, /72  sats 99%  on RA.       Per ED provider, patient with a history of seizures, last seizure prior to this event July 31, 2022, apparently patient was out of medication x3 days, with seizure this a.m. at home again in EMS and again in ER, she was given 1 mg Ativan and Vimpat 200 mg x1 dose, with prolonged postictal state, confused but without any neuro focal deficits.  CT head was completed and nega for additional dose of Vimpat 200 mg and restart home medications.  Family at bedside reports patient not at baseline mentation, will admit to hospital overnight monitoring.  If she ca remain stable can likely disposition in a.m.               Past Medical History:   Diagnosis Date    Asthma     Depression affecting pregnancy     IUD (intrauterine device) in place     placed today 09/10/18    Mental disorder     Seizures     Epilepsy       Past Surgical History:   Procedure Laterality Date    ADENOIDECTOMY W/ MYRINGOTOMY AND TUBES      CHOLECYSTECTOMY         Review of patient's allergies indicates:   Allergen Reactions    Benadryl [diphenhydramine hcl] Other (See Comments)     Lowers seizure threshold.    Quinolones Other (See Comments)     Lowers seizure threshold.     Tramadol Other (See Comments)     seizures       No current facility-administered medications on file prior to encounter.     Current Outpatient Medications on File Prior to Encounter   Medication Sig    clindamycin (CLEOCIN) 300 MG capsule Take 1 capsule (300 mg total) by mouth every 6 (six) hours. for 10 days    diclofenac (VOLTAREN) 50 MG EC tablet Take 1 tablet (50 mg total) by mouth 3 (three) times daily as needed.    estradioL (ESTRACE) 2 MG tablet Take 2 mg  by mouth once daily.    FLUZONE QUAD 9837-0619, PF, 60 mcg (15 mcg x 4)/0.5 mL Syrg ADM 0.5ML IM UTD    JUNEL FE 1/20, 28, 1 mg-20 mcg (21)/75 mg (7) per tablet Take 1 tablet by mouth once daily.    lacosamide (VIMPAT) 200 mg Tab tablet Take 1 tablet (200 mg total) by mouth every 12 (twelve) hours.    levETIRAcetam (KEPPRA) 1000 MG tablet Take 1 tablet (1,000 mg total) by mouth 2 (two) times daily.    NEXPLANON 68 mg Impl subdermal device 68 mg by Subdermal route once.     Family History       Problem Relation (Age of Onset)    Heart disease Father, Maternal Grandfather, Paternal Grandmother, Paternal Grandfather          Tobacco Use    Smoking status: Never    Smokeless tobacco: Never   Substance and Sexual Activity    Alcohol use: Yes     Comment: occasionally    Drug use: No    Sexual activity: Not Currently     Partners: Male     Birth control/protection: None     Comment:       Review of Systems   Constitutional:  Negative for chills, diaphoresis and fever.   HENT:  Negative for congestion, postnasal drip, sinus pressure and sore throat.    Eyes:  Negative for visual disturbance.   Respiratory:  Negative for cough, chest tightness, shortness of breath and wheezing.    Cardiovascular:  Negative for chest pain, palpitations and leg swelling.   Gastrointestinal:  Negative for abdominal distention, abdominal pain, blood in stool, constipation, diarrhea, nausea and vomiting.   Endocrine: Negative.    Genitourinary:  Negative for dysuria.   Musculoskeletal: Negative.    Skin: Negative.    Allergic/Immunologic: Negative.    Neurological:  Positive for seizures. Negative for dizziness, weakness, numbness and headaches.   Hematological: Negative.    Psychiatric/Behavioral: Negative.     Objective:     Vital Signs (Most Recent):  Temp: 98.4 °F (36.9 °C) (11/14/22 1349)  Pulse: (!) 116 (11/14/22 1641)  Resp: (!) 25 (11/14/22 1500)  BP: 126/80 (11/14/22 1641)  SpO2: 100 % (11/14/22 1641)   Vital Signs (24h  Range):  Temp:  [98.4 °F (36.9 °C)] 98.4 °F (36.9 °C)  Pulse:  [115-141] 116  Resp:  [18-26] 25  SpO2:  [99 %-100 %] 100 %  BP: (126-142)/(72-83) 126/80     Weight: 99.8 kg (220 lb)  Body mass index is 38.97 kg/m².    Physical Exam  Constitutional:       General: She is not in acute distress.     Appearance: Normal appearance. She is well-developed. She is not toxic-appearing or diaphoretic.   HENT:      Head: Normocephalic and atraumatic.   Eyes:      General: Lids are normal.      Conjunctiva/sclera: Conjunctivae normal.      Pupils: Pupils are equal, round, and reactive to light.   Neck:      Thyroid: No thyroid mass or thyromegaly.      Vascular: Normal carotid pulses. No JVD.      Trachea: Trachea normal. No tracheal deviation.   Cardiovascular:      Rate and Rhythm: Normal rate and regular rhythm.      Pulses: Normal pulses.      Heart sounds: Normal heart sounds, S1 normal and S2 normal.   Pulmonary:      Effort: Pulmonary effort is normal.      Breath sounds: Normal breath sounds. No stridor.   Abdominal:      General: Bowel sounds are normal.      Palpations: Abdomen is soft.      Tenderness: There is no abdominal tenderness.   Musculoskeletal:         General: Normal range of motion.      Cervical back: Full passive range of motion without pain, normal range of motion and neck supple.   Skin:     General: Skin is warm and dry.      Nails: There is no clubbing.   Neurological:      Mental Status: She is alert and oriented to person, place, and time.      Cranial Nerves: No cranial nerve deficit.      Sensory: No sensory deficit.   Psychiatric:         Speech: Speech normal.         Behavior: Behavior normal. Behavior is cooperative.         Thought Content: Thought content normal.         Judgment: Judgment normal.         CRANIAL NERVES     CN III, IV, VI   Pupils are equal, round, and reactive to light.     Significant Labs: All pertinent labs within the past 24 hours have been reviewed.  Bilirubin:    Recent Labs   Lab 11/14/22  1420   BILITOT 0.2     BMP:   Recent Labs   Lab 11/14/22  1420   GLU 97      K 3.0*      CO2 18*   BUN 18   CREATININE 0.8   CALCIUM 8.6*   MG 2.2     CBC:   Recent Labs   Lab 11/14/22  1408   WBC 17.71*   HGB 14.1   HCT 44.8   *     CMP:   Recent Labs   Lab 11/14/22  1420      K 3.0*      CO2 18*   GLU 97   BUN 18   CREATININE 0.8   CALCIUM 8.6*   PROT 8.4   ALBUMIN 4.0   BILITOT 0.2   ALKPHOS 100   AST 26   ALT 20   ANIONGAP 15     Cardiac Markers:   Recent Labs   Lab 11/14/22  1420   BNP 23     Coagulation:   Recent Labs   Lab 11/14/22  1420   INR 1.2   APTT 26.9     Lipase:   Recent Labs   Lab 11/14/22  1420   LIPASE 47       Magnesium:   Recent Labs   Lab 11/14/22  1420   MG 2.2     Troponin:   Recent Labs   Lab 11/14/22  1420   TROPONINI <0.030     TSH:   Recent Labs   Lab 11/14/22  1420   TSH 2.280   Urine Studies:   Recent Labs   Lab 11/14/22  1510   COLORU Yellow   APPEARANCEUA Hazy*   PHUR 6.0   SPECGRAV 1.030   PROTEINUA 1+*   GLUCUA Negative   KETONESU 1+*   BILIRUBINUA Negative   OCCULTUA Negative   NITRITE Negative   UROBILINOGEN Negative   LEUKOCYTESUR 3+*   RBCUA 1   WBCUA 28*   BACTERIA Occasional   SQUAMEPITHEL 8   HYALINECASTS 18*       Significant Imaging: I have reviewed all pertinent imaging results/findings within the past 24 hours.  X-Ray Chest 1 View    Result Date: 11/14/2022  Reason: History seizures. FINDINGS: PA and lateral chest without comparisons show normal cardiomediastinal silhouette. Lungs are clear. Pulmonary vasculature is normal. No acute osseous abnormality. IMPRESSION: No acute cardiopulmonary abnormality. Electronically signed by:  Cezar Moffett DO  11/14/2022 2:35 PM CST Workstation: [id number]    CT Head Without Contrast    Result Date: 11/14/2022  CMS MANDATED QUALITY DATA-CT RADIATION DOSE-436 All CT scans at this facility dose modulation, iterative reconstruction, and or weight-based dosing when appropriate  to reduce radiation dose to as low as reasonably achievable. HISTORY: Seizure disorder, clinical change FINDINGS: Comparison to multiple prior exams. There is no acute intracranial hemorrhage, with no mass effect or abnormal extra-axial fluid. Gray white differentiation is maintained, with the cortical sulci, ventricles and basal cisterns normal in size for the patient's age. The cerebellum and brainstem are unremarkable. There is mild left maxillary sinus mucosal thickening, with the remaining paranasal sinuses and mastoid air cells clear. There is no acute osseous abnormality. IMPRESSION: Negative noncontrast head CT. Electronically signed by:  José Miguel Scott MD  11/14/2022 3:50 PM CST Workstation: [id number]       Assessment/Plan:     Active Hospital Problems    Diagnosis  POA    *Seizure [R56.9]  Yes     Priority: 1 - High    Post-ictal confusion [F05]  Yes     Priority: 2     Hypokalemia [E87.6]  Yes    Leukocytosis [D72.829]  Yes    Pain, dental [K08.89]  Yes      Resolved Hospital Problems   No resolved problems to display.     27-year-old female with a history as above presented to the emergency room status post seizure with prolonged postictal state. In ED 2nd seizure episode. Given IV ativan, and restarted seizure medications. Neurology consulted.      * Seizure  Place in observation - telemetry   Resume home Keppra and Vimpat  IV Ativan PRN with parameters  Seizure precautions  Neuro checks  Fall percautions  Swallow study before eating    Neurology following, appreciate input   Labs in AM  Electrolyte replacement scale      Seizure precautions discussed including: No driving for six months after the last seizure per [place] law, no swimming, no bathing unattended, no climbing to heights greater than 4 feet, no operation of heavy machinery, and no activity where loss of consciousness can result in injury to yourself or others.        Post-ictal confusion  See above      Leukocytosis  Afebrile.  ?possible UTI given UA however, denies urinary symptoms. ?possible APR. Given low seizure threshold, will provide empiric ABX x1 dose. Urine cultures ordered.      Hypokalemia  Daily labs   Monitor and replete PRN  Electrolyte sliding scale        Pain, dental  Seen in ED on 11/11/2022 for dental pain and started on NSAIDs and oral ABX  Will resume as ordered       VTE Risk Mitigation (From admission, onward)         Ordered     Reason for No Pharmacological VTE Prophylaxis  Once        Question:  Reasons:  Answer:  Patient is Ambulatory    11/14/22 1823     IP VTE HIGH RISK PATIENT  Once         11/14/22 1821     Place sequential compression device  Until discontinued         11/14/22 1821                   JAYLA Christine  Department of Hospital Medicine   Formerly [facility] - Emergency Dept

## 2022-11-15 NOTE — PLAN OF CARE
Patient cleared for discharge from case management standpoint.    Follow up appointments scheduled and added to AVS.    Chart and discharge orders reviewed.  Patient discharged home with no further case management needs.       11/15/22 1222   Final Note   Assessment Type Final Discharge Note   Anticipated Discharge Disposition Home   What phone number can be called within the next 1-3 days to see how you are doing after discharge? [phone]   Hospital Resources/Appts/Education Provided Provided patient/caregiver with written discharge plan information;Appointments scheduled and added to AVS   Post-Acute Status   Discharge Delays (!) Personal Transportation

## 2022-11-15 NOTE — ASSESSMENT & PLAN NOTE
Afebrile. ?possible UTI given UA however, denies urinary symptoms. ?possible APR. Given low seizure threshold, will provide empiric ABX x1 dose. Urine cultures ordered.

## 2022-11-15 NOTE — PLAN OF CARE
[facility]  Initial Discharge Assessment       Primary Care Provider: Drake Ruff Jr, MD    Admission Diagnosis: Status epilepticus [G40.901]    Admission Date: 11/14/2022  Expected Discharge Date: 11/15/2022    Pt confirmed home address and denied HH and DME. Pts PCP is [facility] and she has Medicaid insurance. Pt uses [facility] and stated that her mom will provide transport home. Pts discharge plan is home . CM following.     Discharge Barriers Identified: None    Payor: MEDICAID / Plan: [place] [employer] / Product Type: Managed Medicaid /     Extended Emergency Contact Information  Primary Emergency Contact: Yancy Gonzalez  Mobile Phone: [phone]  Relation: Mother  Preferred language: English   needed? No    Discharge Plan A: Home with family  Discharge Plan B: Home      [facility] - [place], [place] - [address] [facility] [address]  [place] [address]  Phone: [phone] Fax: [phone]      Initial Assessment (most recent)       Adult Discharge Assessment - 11/15/22 1028          Discharge Assessment    Assessment Type Discharge Planning Assessment     Confirmed/corrected address, phone number and insurance Yes     Confirmed Demographics Correct on Facesheet     Source of Information patient     Does patient/caregiver understand observation status Yes     Facility Arrived From: home     Do you expect to return to your current living situation? Yes     Do you have help at home or someone to help you manage your care at home? Yes     Who are your caregiver(s) and their phone number(s)? Mother Yancy Gonzalez [phone]     Prior to hospitilization cognitive status: Alert/Oriented     Current cognitive status: Alert/Oriented     Walking or Climbing Stairs Difficulty none     Dressing/Bathing Difficulty none     Equipment Currently Used at Home none     Readmission within 30 days? No     Patient currently being followed by outpatient case management?  No     Do you currently have service(s) that help you manage your care at home? No     Do you take prescription medications? Yes     Do you have prescription coverage? Yes     Do you have any problems affording any of your prescribed medications? No     Is the patient taking medications as prescribed? yes     Who is going to help you get home at discharge? Mother Yancy Gonzalez [phone]     How do you get to doctors appointments? car, drives self     Are you on dialysis? No     Do you take coumadin? No     Discharge Plan A Home with family     Discharge Plan B Home     DME Needed Upon Discharge  none     Discharge Plan discussed with: Patient     Discharge Barriers Identified None        Physical Activity    On average, how many days per week do you engage in moderate to strenuous exercise (like a brisk walk)? Patient refused     On average, how many minutes do you engage in exercise at this level? Patient refused        Financial Resource Strain    How hard is it for you to pay for the very basics like food, housing, medical care, and heating? Patient refused        Housing Stability    In the last 12 months, was there a time when you were not able to pay the mortgage or rent on time? Patient refused     In the last 12 months, was there a time when you did not have a steady place to sleep or slept in a shelter (including now)? Patient refused        Transportation Needs    In the past 12 months, has lack of transportation kept you from medical appointments or from getting medications? Patient refused     In the past 12 months, has lack of transportation kept you from meetings, work, or from getting things needed for daily living? Patient refused        Food Insecurity    Within the past 12 months, you worried that your food would run out before you got the money to buy more. Patient refused     Within the past 12 months, the food you bought just didn't last and you didn't have money to get more. Patient refused         Stress    Do you feel stress - tense, restless, nervous, or anxious, or unable to sleep at night because your mind is troubled all the time - these days? Patient refused        Social Connections    In a typical week, how many times do you talk on the phone with family, friends, or neighbors? Patient refused     How often do you get together with friends or relatives? Patient refused     How often do you attend Scientology or Restorationism services? Patient refused     Do you belong to any clubs or organizations such as Scientology groups, unions, fraternal or athletic groups, or school groups? Patient refused     How often do you attend meetings of the clubs or organizations you belong to? Patient refused     Are you , , , , never , or living with a partner? Patient refused        Alcohol Use    Q1: How often do you have a drink containing alcohol? Patient refused     Q2: How many drinks containing alcohol do you have on a typical day when you are drinking? Patient refused     Q3: How often do you have six or more drinks on one occasion? Patient refused

## 2022-11-15 NOTE — ASSESSMENT & PLAN NOTE
Place in observation - telemetry   Resume home Keppra and Vimpat  IV Ativan PRN with parameters  Seizure precautions  Neuro checks  Fall percautions  Swallow study before eating    Neurology following, appreciate input   Labs in AM  Electrolyte replacement scale      Seizure precautions discussed including: No driving for six months after the last seizure per [place] law, no swimming, no bathing unattended, no climbing to heights greater than 4 feet, no operation of heavy machinery, and no activity where loss of consciousness can result in injury to yourself or others.

## 2022-11-15 NOTE — NURSING
Dc instructions given, including medications. Pt verbalizes understanding. Pt Dc'ed home via ambulatory on room air to POV with mother. Pt tolerated well. No s/s of pain or distress noted.

## 2022-11-15 NOTE — CONSULTS
[facility] includes the [facility]  Department of Neurology  Neurology Consultation Note        PATIENT NAME: Syeda Gonzalez  MRN: [id number]  CSN: [id number]      TODAY'S DATE: 11/15/2022  ADMIT DATE: 11/14/2022                            CONSULTING PROVIDER: Greg Celestin MD  CONSULT REQUESTED BY: Kami Mckeon MD      Reason for consult:  Seizures       History obtained from chart review and the patient.    HPI: Syeda Gonzalez is a 27 y.o. female with a history of Asthma, Depression affecting pregnancy, IUD (intrauterine device) in place, Mental disorder, and Seizures. who presented to the ED with a Seizures (Hx of seizures, ran out of vimpat for 3 days). Pt had unwitnessed seizure like activity today for an unknown amount of time. She had tongue biting associated with the seizure.  Pt denies pain. Was postictal on EMS arrival     Patient presented to the emergency room via EMS for evaluation of seizure.  Patient reports she was at home going about her usual day with the seizure occurred.  She reports she was lying down and thought she had just fallen asleep.  Cousin walked in the room and noticed her to be having a seizure and activated EMS.       Patient reports she is on Keppra and Vimpat.  She further states she's been out of her Vimpat for few days.  She reports having a 2nd seizure in the emergency room.  Denies falling or hitting head.  Denies recent illness.      She states that her last seizure was at the end of July and it happened because she ran out of seizure medications at that time.  She follows up with [facility] in the [place].    Currently patient is back to her baseline and she had no further seizures since being admitted to the hospital.  She was given 1 dose of 400 mg Vimpat in the ER and was admitted, resumed her usual home doses of Keppra and Vimpat.      PREVIOUS MEDICAL HISTORY:  Past Medical History:   Diagnosis Date    Asthma     Depression affecting pregnancy     IUD  (intrauterine device) in place     placed today 09/10/18    Mental disorder     Seizures     Epilepsy     PREVIOUS SURGICAL HISTORY:  Past Surgical History:   Procedure Laterality Date    ADENOIDECTOMY W/ MYRINGOTOMY AND TUBES      CHOLECYSTECTOMY       FAMILY MEDICAL HISTORY:  Family History   Problem Relation Age of Onset    Heart disease Father     Heart disease Maternal Grandfather     Heart disease Paternal Grandmother     Heart disease Paternal Grandfather     Congenital heart disease Neg Hx     Cardiomyopathy Neg Hx     Arrhythmia Neg Hx     Hypertension Neg Hx     Pacemaker/defibrilator Neg Hx      SOCIAL HISTORY:  Social History     Tobacco Use    Smoking status: Never    Smokeless tobacco: Never   Substance Use Topics    Alcohol use: Yes     Comment: occasionally    Drug use: No     ALLERGIES:  Review of patient's allergies indicates:   Allergen Reactions    Benadryl [diphenhydramine hcl] Other (See Comments)     Lowers seizure threshold.    Quinolones Other (See Comments)     Lowers seizure threshold.     Tramadol Other (See Comments)     seizures     HOME MEDICATIONS:  Prior to Admission medications    Medication Sig Start Date End Date Taking? Authorizing Provider   clindamycin (CLEOCIN) 300 MG capsule Take 1 capsule (300 mg total) by mouth every 6 (six) hours. for 10 days 11/10/22 11/20/22  Shauna Montiel NP   diclofenac (VOLTAREN) 50 MG EC tablet Take 1 tablet (50 mg total) by mouth 3 (three) times daily as needed. 11/10/22   Shauna Montiel NP   estradioL (ESTRACE) 2 MG tablet Take 2 mg by mouth once daily.    Historical Provider   FLUZONE QUAD 0394-7860, PF, 60 mcg (15 mcg x 4)/0.5 mL Syrg ADM 0.5ML IM UTD 10/20/18   Historical Provider   JUNEL FE 1/20, 28, 1 mg-20 mcg (21)/75 mg (7) per tablet Take 1 tablet by mouth once daily. 10/5/22   Historical Provider   lacosamide (VIMPAT) 200 mg Tab tablet Take 1 tablet (200 mg total) by mouth every 12 (twelve) hours. 5/19/22   Drake Ruff Jr., MD  "  levETIRAcetam (KEPPRA) 1000 MG tablet Take 1 tablet (1,000 mg total) by mouth 2 (two) times daily. 4/12/22 4/12/23  Frederic Rogel MD     CURRENT SCHEDULED MEDICATIONS:   clindamycin  300 mg Oral Q6H    docusate sodium  100 mg Oral BID    lacosamide  200 mg Oral Q12H    levETIRAcetam  1,000 mg Oral BID     CURRENT INFUSIONS:    CURRENT PRN MEDICATIONS:  dextrose 10%, dextrose 10%, glucagon (human recombinant), glucose, glucose, ibuprofen, lorazepam, magnesium oxide, magnesium oxide, naloxone, ondansetron, potassium bicarbonate, potassium bicarbonate, potassium bicarbonate, potassium, sodium phosphates, potassium, sodium phosphates, potassium, sodium phosphates, sodium chloride 0.9%    REVIEW OF SYSTEMS:  Please refer to the HPI for all pertinent positive and negative findings. A comprehensive review of all other systems was negative.       PHYSICAL EXAM:  Patient Vitals for the past 24 hrs:   BP Temp Temp src Pulse Resp SpO2 Height Weight   11/15/22 0817 (!) 111/90 98 °F (36.7 °C) Oral 80 20 97 % -- --   11/15/22 0300 129/82 98 °F (36.7 °C) Oral 85 17 100 % -- --   11/14/22 2300 (!) 118/91 97.5 °F (36.4 °C) Oral 78 17 99 % -- --   11/14/22 2002 135/84 97.3 °F (36.3 °C) -- 86 16 99 % 5' 3" (1.6 m) 105.4 kg (232 lb 5.8 oz)   11/14/22 1900 (!) 118/57 -- -- 93 15 100 % -- --   11/14/22 1830 135/60 -- -- 92 -- 100 % -- --   11/14/22 1800 120/78 -- -- 99 -- 100 % -- --   11/14/22 1641 126/80 -- -- (!) 116 -- 100 % -- --   11/14/22 1500 129/81 -- -- (!) 127 (!) 25 99 % -- --   11/14/22 1430 -- -- -- (!) 141 (!) 26 -- -- --   11/14/22 1400 (!) 142/83 -- -- (!) 115 -- 100 % -- --   11/14/22 1349 132/72 98.4 °F (36.9 °C) Oral (!) 122 18 99 % 5' 3" (1.6 m) 99.8 kg (220 lb)     GENERAL APPEARANCE: Alert, well-developed, well-nourished in no acute distress.  HEENT: Normocephalic and atraumatic. PERRL. Oropharynx unremarkable.  PULM: Normal respiratory effort. No accessory muscle use.  CV: RRR.  ABDOMEN: Soft, " nontender.  EXTREMITIES: No obvious signs of vascular compromise. Pulses present. No cyanosis, clubbing or edema.  SKIN: Clear; no rashes, lesions or skin breaks in exposed areas.    NEURO:NEURO:  MENTAL STATUS: Patient awake and oriented to time, place, and person. Recent/remote memory normal. Attention span/concentration normal. Speech fluent. Good comprehension, naming, and repetition. Fund of knowledge appropriate for patient's level of education. Affect normal.    CRANIAL NERVES:  CN I: Not tested.  CN II: Fundoscopic exam not performed.  CN III, IV, VI: Pupils equal, round and reactive to light; extra ocular movements full and intact.  CN V: Facial sensation normal.  CN VII: No facial asymmetry.  CN VIII:  Hearing grossly normal bilaterally. No pathologic nystagmus or skew deviation.  CN IX, X: Palate elevates symmetrically.  CN XI: Shoulder shrug and chin rotation equal with intact strength.  CN XII: Tongue protrusion midline.    MOTOR: Normal bulk. Tone normal and symmetric throughout.  Strength 5/5 throughout.  No abnormal movements. No tremor.    REFLEXES: DTRs 2+; normal and symmetric throughout. Plantar response downgoing.    SENSATION: Sensation grossly intact to fine touch, pain/temperature, vibration and position.    COORDINATION: Finger-to-nose and heel to shin normal for age and symmetric. Finger tapping and alternating movements normal.    STATION and GAIT: not assessed       Labs:  Recent Labs   Lab 11/14/22  1420 11/15/22  0453    138   K 3.0* 3.7    108   CO2 18* 24   BUN 18 17   CREATININE 0.8 0.7   GLU 97 93   CALCIUM 8.6* 8.3*   MG 2.2  --      Recent Labs   Lab 11/14/22  1408 11/15/22  0453   WBC 17.71* 8.32   HGB 14.1 12.6   HCT 44.8 39.0   * 344     Recent Labs   Lab 11/14/22  1420   ALBUMIN 4.0   PROT 8.4   BILITOT 0.2   ALKPHOS 100   ALT 20   AST 26     Lab Results   Component Value Date    INR 1.2 11/14/2022     Lab Results   Component Value Date    TRIG 181 (H)  04/25/2018    HDL 52 04/25/2018    CHOLHDL 24.2 04/25/2018     Lab Results   Component Value Date    HGBA1C 4.8 08/25/2018     No results found for: PROTEINCSF, GLUCCSF, WBCCSF    Imaging:  I have reviewed and interpreted the pertinent imaging and lab results.      CT Head Without Contrast  CMS MANDATED QUALITY DATA-CT RADIATION DOSE-436  All CT scans at this facility dose modulation, iterative reconstruction, and or weight-based dosing when appropriate to reduce radiation dose to as low as reasonably achievable.    HISTORY: Seizure disorder, clinical change    FINDINGS: Comparison to multiple prior exams. There is no acute intracranial hemorrhage, with no mass effect or abnormal extra-axial fluid. Gray white differentiation is maintained, with the cortical sulci, ventricles and basal cisterns normal in size for the patient's age.    The cerebellum and brainstem are unremarkable. There is mild left maxillary sinus mucosal thickening, with the remaining paranasal sinuses and mastoid air cells clear. There is no acute osseous abnormality.    IMPRESSION: Negative noncontrast head CT.    Electronically signed by:  José Miguel Scott MD  11/14/2022 3:50 PM CST Workstation: [id number]  X-Ray Chest 1 View  Reason: History seizures.    FINDINGS:    PA and lateral chest without comparisons show normal cardiomediastinal silhouette.  Lungs are clear. Pulmonary vasculature is normal. No acute osseous abnormality.    IMPRESSION:    No acute cardiopulmonary abnormality.    Electronically signed by:  Cezar Moffett DO  11/14/2022 2:35 PM CST Workstation: [id number]         ASSESSMENT & PLAN:    Breakthrough seizures    Plan:   Etiology of breakthrough seizures is secondary to noncompliance of medications (patient ran out of her Vimpat).  Given a loading dose of Vimpat in the ER due to prolonged postictal state.  Resume home dose of Vimpat and Keppra.  Continue Keppra 1000 mg b.i.d. and Vimpat 200 mg b.i.d..  Seizure precautions while  inpatient  Neuro checks per unit protocol  Physical and occupational therapy evaluate and treat  Educated patient about compliance of medication.   Patient will follow-up with neurology clinic.  No further neurologic workup is needed at this time.      Seizure education provided.      No driving for now, no swimming alone, no climbing high areas, no operation of heavy machinery or working with high risk electricity equipment.    Continue to take AEDs as prescribed. If any major side effects from neurological medications go to the ED including mood changes and severe depression.  Patient and/or next of kin informed.  Follow up Neurology in 2 weeks. Call [phone] to make an appointment.    Medication side effects discussed with the patient and/or caregiver.        Thank you kindly for including us in the care of this patient. Please do not hesitate to contact us with any questions.        45 minutes of care time has been spent evaluating with the patient. Time includes chart review not limited to diagnostic imaging, labs, and vitals, patient assessment, discussion with family and nursing, current order evaluations, and new order entries.       Greg Celestin MD  Neurology/vascular Neurology  Date of Service: 11/15/2022  9:30 AM    --------------------------------------------------------------------------------------------------------------------------------------------------------------------------------------------------------------------------------------------------------------  Please note: This note was transcribed using voice recognition software. Because of this technology there are often uinintended grammatical, spelling, and other transcription errors. Please disregard these errors.

## 2022-11-16 ENCOUNTER — PATIENT OUTREACH (OUTPATIENT)
Dept: EMERGENCY MEDICINE | Facility: HOSPITAL | Age: 27
End: 2022-11-16

## 2022-11-16 NOTE — DISCHARGE SUMMARY
[facility] Medicine  Discharge Summary      Patient Name: Syeda Gonzalez  MRN: [id number]  REX: [id number]  Patient Class: OP- Observation  Admission Date: 11/14/2022  Hospital Length of Stay: 0 days  Discharge Date and Time: 11/15/2022 12:39 PM  Attending Physician: No att. providers found   Discharging Provider: Kami Mckeon MD  Primary Care Provider: [facility]    Primary Care Team: Networked reference to record PCT     HPI:   Syeda Gonzalez is a 27 y.o. female with a history as  has a past medical history of Asthma, Depression affecting pregnancy, IUD (intrauterine device) in place, Mental disorder, and Seizures. who presented to the ED with a Seizures (Hx of seizures, out of vimpat for 3 days, mom was going to  today. Pt had unwitnessed seizure like activity today for an unknown amount of time. Was in bed, no apparent trauma. Pt denies pain. Was postictal on EMS arrival, CBG-103, VSS w EMS (slightly tachy). )    Patient presented to the emergency room via EMS for evaluation of seizure.  Patient reports she was at home going about her usual day with the seizure occurred.  She reports she was lying down and thought she had just fallen asleep.  Cousin walked in the room and noticed her to be having a seizure and activated EMS.      Patient reports she is on Keppra and Vimpat.  She further states she's been out of her Vimpat for few days.  She reports having a 2nd seizure in the emergency room.  Denies falling or hitting head.  Denies recent illness.     Lab and imaging obtained and reviewed.   CBC shows WBCs 17.71 MCHC 31.5 PLTs 451. PT/INR 14.3/1.2. CMP shows K+ 3.0 CO2 18 Ca+ 8.6. CE within normal range. EKG shows ST rate, nonischemic. CXR without acute cardiopulmonary findings. UDS +marijuana. UA with 1+ protein 1+ ketones 3+ leukocytes 28 WBCs occ bacteria and 18 hyaline cast. CT head without acute intracranial findings.  On  admit, afebrile, /72  sats 99%  on RA.       Per ED provider, patient with a history of seizures, last seizure prior to this event July 31, 2022, apparently patient was out of medication x3 days, with seizure this a.m. at home again in EMS and again in ER, she was given 1 mg Ativan and Vimpat 200 mg x1 dose, with prolonged postictal state, confused but without any neuro focal deficits.  CT head was completed and nega for additional dose of Vimpat 200 mg and restart home medications.  Family at bedside reports patient not at baseline mentation, will admit to hospital overnight monitoring.  If she ca remain stable can likely disposition in a.m.               * No surgery found *      Hospital Course:   27-year-old female with history of seizure disorder on Vimpat and Keppra presents after seizure activity witnessed at home by a family member.  Shortly after coming to the emergency room the patient had another tonic-clonic grand mal seizure that stopped spontaneously.  The patient had a prolonged postictal state.  Of note patient ran out of her antiepileptics.  While inpatient she did not have any seizure-like activity, received loading dose of Vimpat followed by maintenance dose of Keppra and Vimpat.  Evaluated by Neurology who cleared her for discharge on Vimpat 200 b.i.d. and Keppra 1000 mg b.i.d..  I sent all her prescriptions to hospitals pharmacy to ascertain delivery before discharge.  She was advised to follow-up with her PCP and Neurology, she was advised against driving.     I have seen the patient on the day of discharge and reviewed the discharge instructions as outlined below. Patient verbalized understanding and is aware to contact primary care physician or return to ED if new or worsening symptoms.        Goals of Care Treatment Preferences:  Code Status: Full Code      Consults:   Consults (From admission, onward)        Status Ordering Provider     Inpatient consult to Neurology  Once         Provider:  Greg Celestin MD    Completed TALAT GLASGOW          No new Assessment & Plan notes have been filed under this hospital service since the last note was generated.  Service: Hospital Medicine    Final Active Diagnoses:    Diagnosis Date Noted POA    Pain, dental [K08.89] 11/10/2022 Yes      Problems Resolved During this Admission:    Diagnosis Date Noted Date Resolved POA    PRINCIPAL PROBLEM:  Seizure [R56.9] 08/22/2018 11/15/2022 Yes    Post-ictal confusion [F05] 11/14/2022 11/15/2022 Yes    Hypokalemia [E87.6] 11/14/2022 11/15/2022 Yes    Leukocytosis [D72.829] 11/14/2022 11/15/2022 Yes       Discharged Condition: good    Disposition: Home or Self Care    Follow Up:   Follow-up Information     Greg Celestin MD. Go on 11/29/2022.    Specialty: Neurology  Why: @ 10:30 am with Jessica Alexandre NP  Contact information:  [address] [place] [address]  [phone]             [facility]. Go on 11/23/2022.    Why: @ 11:30 am with VALENTINO Tijerina  Contact information:  [address] [place] [address]  [phone]                       Patient Instructions:      Diet Adult Regular     No driving until:   Order Comments: No driving until cleared by MD     Notify your health care provider if you experience any of the following:  persistent dizziness, light-headedness, or visual disturbances     Activity as tolerated       Significant Diagnostic Studies: Labs: All labs within the past 24 hours have been reviewed    Pending Diagnostic Studies:     None         Medications:  Reconciled Home Medications:      Medication List      CONTINUE taking these medications    clindamycin 300 MG capsule  Commonly known as: CLEOCIN  Take 1 capsule (300 mg total) by mouth every 6 (six) hours. for 10 days     diclofenac 50 MG EC tablet  Commonly known as: VOLTAREN  Take 1 tablet (50 mg total) by mouth 3 (three) times daily as needed.     estradioL 2 MG tablet  Commonly known  as: ESTRACE  Take 2 mg by mouth once daily.     FLUZONE QUAD [id number] (PF) 60 mcg (15 mcg x 4)/0.5 mL Syrg  Generic drug: flu vac [id number] 36mos up(PF)  ADM 0.5ML IM UTD     JUNEL FE 1/20 (28) 1 mg-20 mcg (21)/75 mg (7) per tablet  Generic drug: norethindrone-ethinyl estradiol  Take 1 tablet by mouth once daily.     levETIRAcetam 1000 MG tablet  Commonly known as: KEPPRA  Take 1 tablet (1,000 mg total) by mouth 2 (two) times daily.     VIMPAT 200 mg Tab tablet  Generic drug: lacosamide  Take 1 tablet (200 mg total) by mouth every 12 (twelve) hours.            Indwelling Lines/Drains at time of discharge:   Lines/Drains/Airways     None                 Time spent on the discharge of patient: 32 minutes         Kami Mckeon MD  Department of Hospital Medicine  [facility]

## 2022-11-16 NOTE — HOSPITAL COURSE
27-year-old female with history of seizure disorder on Vimpat and Keppra presents after seizure activity witnessed at home by a family member.  Shortly after coming to the emergency room the patient had another tonic-clonic grand mal seizure that stopped spontaneously.  The patient had a prolonged postictal state.  Of note patient ran out of her antiepileptics.  While inpatient she did not have any seizure-like activity, received loading dose of Vimpat followed by maintenance dose of Keppra and Vimpat.  Evaluated by Neurology who cleared her for discharge on Vimpat 200 b.i.d. and Keppra 1000 mg b.i.d..  I sent all her prescriptions to [facility] pharmacy to ascertain delivery before discharge.  She was advised to follow-up with her PCP and Neurology, she was advised against driving.     I have seen the patient on the day of discharge and reviewed the discharge instructions as outlined below. Patient verbalized understanding and is aware to contact primary care physician or return to ED if new or worsening symptoms.

## 2022-11-16 NOTE — PROGRESS NOTES
Zakia Hendricks  ED Navigator  Emergency Department    [place] [facility] [place]  Role: Community Health Worker    Date: 11/16/2022  Patient Name: Syeda Gonzalez  MRN: [id number]  PCP: [facility]    Assessment:     Syeda Gonzalez is a 27 y.o. female who has presented to ED for seizure. Patient has visited the ED 1 times in the past 3 months. Patient did not contact PCP.     ED Navigator Initial Assessment    ED Navigator Enrollment Documentation  Consent to Services  Does patient consent to completing the assessment?: Yes  Contact  Method of Initial Contact: Phone  Transportation  Does the patient have issues with Transportation?: No  Does the patient have transportation to and from healthcare appointments?: Yes  Insurance Coverage  Do you have coverage/adequate coverage?: Yes  Type/kind of coverage: Medicaid/[place] [employer]  Is patient able to afford co-pays/deductibles?: Yes  Is patient able to afford HME or supplies?: Yes  Does patient have an established [facility] PCP?: No  Does patient need assistance finding a PCP?: No  Does the patient have a lack of adequate coverage?: No  Specialist Appointment  Did the patient come to the ED to see a specialist?: No  Does the patient have a pending specialist referral?: No  Does the patient have a specialist appointment made?: Yes  PCP Follow Up Appointment  Has the patient had an appointment with a primary care provider in the past year?: No  Does the patient have a follow up appontment with a PCP?: Yes  Upcoming appointment date: 11/23/22  Provider: [facility]  When was the last time you saw your PCP?: 11/16/21  Medications  Is patient able to afford medication?: Yes  Is patient unable to get medication due to lack of transportation?: No  Psychological  Does the patient have psycho-social concerns?: No  Food  Does the patient have concerns about food?: No  Communication/Education  Does the  patient have limited English proficiency/English not primary language?: No  Does patient have low literacy and/or low health literacy?: Yes  Does patient have concerns with care?: No  Does patient have dissatisfaction with care?: No  Other Financial Concerns  Does the patient have immediate financial distress?: No  Does the patient have general financial concerns?: Yes  Other Social Barriers/Concerns  Does the patient have any additional barriers or concerns?: Other (see comments)  Primary Barrier  Barriers identified: Patient identified no barriers to care  Root Cause of ED Utilization: Patient Knowledge/Low Health Literacy  Plan to address Patient Knowledge/Low Health Literacy: Provided information for [facility] On [facility] 24/7 Nurse triage line [phone] or 1-866-[facility] ([phone])  Next steps: Provided Education  Was education/educational materials provided surrounding PCP services/creating a medical home?: Yes Was education verbal or written?: Written     Was education/educational materials provided surrounding low cost, healthy foods?: Yes Was education verbal or written?: Written     Was education/educational materials provided surrounding other items? If so, use comment to explain.: Yes Was education verbal or written?: Written   Plan: Provided information for [facility] 24/7 Nurse triage line, [phone] or 1-866[facility] ([phone])  Expected Date of Follow Up 1: 12/14/22         Social History     Socioeconomic History    Marital status: Single   Tobacco Use    Smoking status: Never    Smokeless tobacco: Never   Substance and Sexual Activity    Alcohol use: Yes     Comment: occasionally    Drug use: No    Sexual activity: Not Currently     Partners: Male     Birth control/protection: None     Comment:     Social History Narrative    Fob - lad coronary artery bridge, sister enlarged heart     Social Determinants of Health     Financial Resource Strain: Medium Risk    Difficulty of Paying  Living Expenses: Somewhat hard   Food Insecurity: No Food Insecurity    Worried About Running Out of Food in the Last Year: Never true    Ran Out of Food in the Last Year: Never true   Transportation Needs: No Transportation Needs    Lack of Transportation (Medical): No    Lack of Transportation (Non-Medical): No   Physical Activity: Unknown    Days of Exercise per Week: Patient refused    Minutes of Exercise per Session: Patient refused   Stress: Stress Concern Present    Feeling of Stress : To some extent   Social Connections: Unknown    Frequency of Communication with Friends and Family: Three times a week    Frequency of Social Gatherings with Friends and Family: Three times a week    Attends [employer] Services: Patient refused    Active Member of Clubs or Organizations: Patient refused    Attends Club or Organization Meetings: Patient refused    Marital Status: Never    Housing Stability: Unknown    Unable to Pay for Housing in the Last Year: No    Unstable Housing in the Last Year: No       Plan:   Spoke with patient on the telephone and completed initial enrollment.  Patient reported no concerns with housing, food, utilities, or transportation.  Patient lives with her mom and two sons, ages 9 and 4.  Patient stated she has scheduled appointments with a new PCP and Neurologist.  Patient stated she would like dental resources as she needs work done.  Patient said she applied for SSI but was recently told her next follow up would not be until February 2023.  Patient expressed concerns for increasing seizures and how she is going to buy susy presents for her sons.  Patient does not drink, smoke, or have feelings of depression/anxiety.  Patient was given education on ([facility] information, [facility] Virtual [facility] information, and Heart healthy diet tips), [facility] Nurse Triage line, organizations that offer financial assistance with daily living expenses.    Zakia Hendricks ED  Navigator

## 2022-12-07 DIAGNOSIS — G40.909 SEIZURE DISORDER: ICD-10-CM

## 2022-12-07 RX ORDER — LACOSAMIDE 200 MG/1
200 TABLET ORAL EVERY 12 HOURS
Qty: 60 TABLET | Refills: 5 | Status: SHIPPED | OUTPATIENT
Start: 2022-12-07 | End: 2023-06-19 | Stop reason: SDUPTHER

## 2022-12-07 RX ORDER — LACOSAMIDE 200 MG/1
200 TABLET ORAL EVERY 12 HOURS
Qty: 60 TABLET | Refills: 0 | OUTPATIENT
Start: 2022-12-07 | End: 2023-12-07

## 2022-12-07 NOTE — TELEPHONE ENCOUNTER
Pt had a seizure about 3 weeks ago and ended up in [facility].  She had missed 3 days of Vimpat. Pt saw neuro care for hosp f/u.  Instructed the pt to call office to update on any seizure.

## 2022-12-07 NOTE — TELEPHONE ENCOUNTER
----- Message from Lorenavirgilio Wakefieldard sent at 12/7/2022 10:51 AM CST -----  Regarding: RX REFILL ISSUE PER PHARMACY  Contact: Patient  Type:  RX Refill Request    Who Called: Patient    Refill or New Rx: refill    RX Name and Strength: lacosamide (VIMPAT) 200 mg Tab tablet    How is the patient currently taking it? (ex. 1XDay)    Is this a 30 day or 90 day RX:    Preferred Pharmacy with phone number:   [facility] - [place], [place] - [address] [facility]  [address] [address] [address]  Phone: [phone] Fax: [phone]      Would the patient rather a call back or a response via [facility]?call back    Best Call Back Number: [phone]    Additional Information: state she was told to reach out to provider for them to refill Rx; her other Rx was ready but not this one; states he nix snot want to run out again; please advise

## 2022-12-15 ENCOUNTER — PATIENT OUTREACH (OUTPATIENT)
Dept: EMERGENCY MEDICINE | Facility: HOSPITAL | Age: 27
End: 2022-12-15

## 2022-12-16 NOTE — PROGRESS NOTES
ED navigator spoke to Ms. Landa who advised that she was doing well. She advised that she did not remember the interaction with Serafin due to her seizures but I assured her it was okay. She denied the need for any new resources at this time. I advised that if she needed anything she could reach out to me and that I would follow up in a few weeks. She agreed to a follow up call.     TONI Altamirano  ED navigator

## 2023-01-05 ENCOUNTER — PATIENT OUTREACH (OUTPATIENT)
Dept: EMERGENCY MEDICINE | Facility: HOSPITAL | Age: 28
End: 2023-01-05

## 2023-01-06 NOTE — PROGRESS NOTES
ED navigator spoke to Ms. Landa who advised that she was doing well. She advised that she had not had any follow up appointments. She denied the need for any new resources at this time. She was advised that I would follow up with her in a few weeks and that she could call if she needed anything. She agreed to a follow up call.     TONI Altamirano  ED navigator

## 2023-05-12 ENCOUNTER — TELEPHONE (OUTPATIENT)
Dept: NEUROLOGY | Facility: CLINIC | Age: 28
End: 2023-05-12
Payer: MEDICAID

## 2023-05-12 NOTE — TELEPHONE ENCOUNTER
Spoke to the pt, appt scheduled on 5/17/23 at 1020 with Dr. Drake Ruff.  Date, time and location discussed.

## 2023-05-28 ENCOUNTER — HOSPITAL ENCOUNTER (EMERGENCY)
Facility: HOSPITAL | Age: 28
Discharge: HOME | End: 2023-05-28
Attending: EMERGENCY MEDICINE
Payer: MEDICAID

## 2023-05-28 VITALS
HEART RATE: 106 BPM | TEMPERATURE: 99 F | DIASTOLIC BLOOD PRESSURE: 75 MMHG | BODY MASS INDEX: 37.21 KG/M2 | OXYGEN SATURATION: 100 % | HEIGHT: 63 IN | SYSTOLIC BLOOD PRESSURE: 126 MMHG | RESPIRATION RATE: 18 BRPM | WEIGHT: 210 LBS

## 2023-05-28 DIAGNOSIS — G40.909 SEIZURE DISORDER: Primary | ICD-10-CM

## 2023-05-28 LAB
ALBUMIN SERPL BCP-MCNC: 3.5 G/DL (ref 3.5–5.2)
ALP SERPL-CCNC: 88 U/L (ref 55–135)
ALT SERPL W/O P-5'-P-CCNC: 16 U/L (ref 10–44)
ANION GAP SERPL CALC-SCNC: 11 MMOL/L (ref 8–16)
AST SERPL-CCNC: 23 U/L (ref 10–40)
B-HCG UR QL: NEGATIVE
BASOPHILS # BLD AUTO: 0.05 K/UL (ref 0–0.2)
BASOPHILS NFR BLD: 0.5 % (ref 0–1.9)
BILIRUB SERPL-MCNC: 0.4 MG/DL (ref 0.1–1)
BUN SERPL-MCNC: 12 MG/DL (ref 6–20)
CALCIUM SERPL-MCNC: 8.6 MG/DL (ref 8.7–10.5)
CHLORIDE SERPL-SCNC: 108 MMOL/L (ref 95–110)
CO2 SERPL-SCNC: 21 MMOL/L (ref 23–29)
CREAT SERPL-MCNC: 0.8 MG/DL (ref 0.5–1.4)
DIFFERENTIAL METHOD: ABNORMAL
EOSINOPHIL # BLD AUTO: 0.2 K/UL (ref 0–0.5)
EOSINOPHIL NFR BLD: 1.8 % (ref 0–8)
ERYTHROCYTE [DISTWIDTH] IN BLOOD BY AUTOMATED COUNT: 12.1 % (ref 11.5–14.5)
EST. GFR  (NO RACE VARIABLE): >60 ML/MIN/1.73 M^2
GLUCOSE SERPL-MCNC: 90 MG/DL (ref 70–110)
HCT VFR BLD AUTO: 39.9 % (ref 37–48.5)
HGB BLD-MCNC: 12.9 G/DL (ref 12–16)
IMM GRANULOCYTES # BLD AUTO: 0.04 K/UL (ref 0–0.04)
IMM GRANULOCYTES NFR BLD AUTO: 0.4 % (ref 0–0.5)
LYMPHOCYTES # BLD AUTO: 1.6 K/UL (ref 1–4.8)
LYMPHOCYTES NFR BLD: 14.7 % (ref 18–48)
MAGNESIUM SERPL-MCNC: 1.9 MG/DL (ref 1.6–2.6)
MCH RBC QN AUTO: 28.6 PG (ref 27–31)
MCHC RBC AUTO-ENTMCNC: 32.3 G/DL (ref 32–36)
MCV RBC AUTO: 89 FL (ref 82–98)
MONOCYTES # BLD AUTO: 0.6 K/UL (ref 0.3–1)
MONOCYTES NFR BLD: 5.6 % (ref 4–15)
NEUTROPHILS # BLD AUTO: 8.2 K/UL (ref 1.8–7.7)
NEUTROPHILS NFR BLD: 77 % (ref 38–73)
NRBC BLD-RTO: 0 /100 WBC
PLATELET # BLD AUTO: 387 K/UL (ref 150–450)
PMV BLD AUTO: 10.7 FL (ref 9.2–12.9)
POTASSIUM SERPL-SCNC: 3.7 MMOL/L (ref 3.5–5.1)
PROT SERPL-MCNC: 7.6 G/DL (ref 6–8.4)
RBC # BLD AUTO: 4.51 M/UL (ref 4–5.4)
SODIUM SERPL-SCNC: 140 MMOL/L (ref 136–145)
WBC # BLD AUTO: 10.65 K/UL (ref 3.9–12.7)

## 2023-05-28 PROCEDURE — 85025 COMPLETE CBC W/AUTO DIFF WBC: CPT | Performed by: EMERGENCY MEDICINE

## 2023-05-28 PROCEDURE — 81025 URINE PREGNANCY TEST: CPT | Performed by: EMERGENCY MEDICINE

## 2023-05-28 PROCEDURE — 96375 TX/PRO/DX INJ NEW DRUG ADDON: CPT

## 2023-05-28 PROCEDURE — 80177 DRUG SCRN QUAN LEVETIRACETAM: CPT | Performed by: EMERGENCY MEDICINE

## 2023-05-28 PROCEDURE — 96365 THER/PROPH/DIAG IV INF INIT: CPT

## 2023-05-28 PROCEDURE — 63600175 PHARM REV CODE 636 W HCPCS: Performed by: EMERGENCY MEDICINE

## 2023-05-28 PROCEDURE — 80053 COMPREHEN METABOLIC PANEL: CPT | Performed by: EMERGENCY MEDICINE

## 2023-05-28 PROCEDURE — 83735 ASSAY OF MAGNESIUM: CPT | Performed by: EMERGENCY MEDICINE

## 2023-05-28 PROCEDURE — 99284 EMERGENCY DEPT VISIT MOD MDM: CPT | Mod: 25

## 2023-05-28 RX ORDER — LEVETIRACETAM 10 MG/ML
1000 INJECTION INTRAVASCULAR
Status: COMPLETED | OUTPATIENT
Start: 2023-05-28 | End: 2023-05-28

## 2023-05-28 RX ORDER — LORAZEPAM 2 MG/ML
1 INJECTION INTRAMUSCULAR
Status: COMPLETED | OUTPATIENT
Start: 2023-05-28 | End: 2023-05-28

## 2023-05-28 RX ADMIN — LEVETIRACETAM INJECTION 1000 MG: 10 INJECTION INTRAVENOUS at 02:05

## 2023-05-28 RX ADMIN — LORAZEPAM 1 MG: 2 INJECTION INTRAMUSCULAR; INTRAVENOUS at 02:05

## 2023-05-28 NOTE — ED PROVIDER NOTES
Encounter Date: 5/28/2023       History     Chief Complaint   Patient presents with    Seizures     Last seizure was approximately September.  Patient thinks she may have missed a dose of her medication (Keppra and Vimpat).  Patient has an appointment with her neurologist Tuesday.     Patient presents emergency department with reported seizure activity patient states her last seizure was in September she believes she missed a dose or 2 of her Keppra and Vimpat she did sustain a small bite wound to her tongue she had a seizure at home administer 2nd seizure EN route both reported late lasted approximately 60 seconds she was postictal after each event she does remain postictal but is apparently improving      Review of patient's allergies indicates:   Allergen Reactions    Benadryl [diphenhydramine hcl] Other (See Comments)     Lowers seizure threshold.    Quinolones Other (See Comments)     Lowers seizure threshold.     Tramadol Other (See Comments)     seizures     Past Medical History:   Diagnosis Date    Asthma     Depression affecting pregnancy     IUD (intrauterine device) in place     placed today 09/10/18    Mental disorder     Seizures     Epilepsy     Past Surgical History:   Procedure Laterality Date    ADENOIDECTOMY W/ MYRINGOTOMY AND TUBES      CHOLECYSTECTOMY       Family History   Problem Relation Age of Onset    Heart disease Father     Heart disease Maternal Grandfather     Heart disease Paternal Grandmother     Heart disease Paternal Grandfather     Congenital heart disease Neg Hx     Cardiomyopathy Neg Hx     Arrhythmia Neg Hx     Hypertension Neg Hx     Pacemaker/defibrilator Neg Hx      Social History     Tobacco Use    Smoking status: Never    Smokeless tobacco: Never   Substance Use Topics    Alcohol use: Yes     Comment: occasionally    Drug use: No     Review of Systems   Neurological:  Positive for seizures. Negative for syncope, speech difficulty, weakness and headaches.   All other systems  reviewed and are negative.    Physical Exam     Initial Vitals [05/28/23 1334]   BP Pulse Resp Temp SpO2   131/79 (!) 142 16 98.6 °F (37 °C) 99 %      MAP       --         Physical Exam    Constitutional: She appears well-developed and well-nourished. No distress.   HENT:   Head: Normocephalic and atraumatic.   Right Ear: External ear normal.   Left Ear: External ear normal.   Mouth/Throat: Oropharynx is clear and moist.   Eyes: Conjunctivae and EOM are normal. Pupils are equal, round, and reactive to light.   Neck: Neck supple.   Normal range of motion.  Cardiovascular:  Normal rate, regular rhythm, normal heart sounds and intact distal pulses.           Pulmonary/Chest: Breath sounds normal. No respiratory distress.   Abdominal: Abdomen is soft. Bowel sounds are normal. There is no abdominal tenderness.   Musculoskeletal:         General: No edema. Normal range of motion.      Cervical back: Normal range of motion and neck supple.     Neurological: She is alert and oriented to person, place, and time. GCS score is 15. GCS eye subscore is 4. GCS verbal subscore is 5. GCS motor subscore is 6.   Skin: Skin is warm and dry. Capillary refill takes less than 2 seconds. No rash noted.   Psychiatric: She has a normal mood and affect. Her behavior is normal.       ED Course   Procedures  Labs Reviewed   CBC W/ AUTO DIFFERENTIAL - Abnormal; Notable for the following components:       Result Value    Gran # (ANC) 8.2 (*)     Gran % 77.0 (*)     Lymph % 14.7 (*)     All other components within normal limits   COMPREHENSIVE METABOLIC PANEL - Abnormal; Notable for the following components:    CO2 21 (*)     Calcium 8.6 (*)     All other components within normal limits   PREGNANCY TEST, URINE RAPID    Narrative:     Specimen Source->Urine   MAGNESIUM   LEVETIRACETAM  (KEPPRA) LEVEL          Imaging Results    None          Medications   levETIRAcetam in NaCl (iso-os) IVPB 1,000 mg (0 mg Intravenous Stopped 5/28/23 1438)    LORazepam injection 1 mg (1 mg Intravenous Given 5/28/23 8782)     Medical Decision Making:   Clinical Tests:   Lab Tests: Ordered and Reviewed  ED Management:  Given patient's missed dose of Keppra I gave her a g of Keppra IV I have also given her dose of Ativan emergency department she was observed in the emergency department for over 3 hours with no further seizure activity noted patient back at baseline requesting discharge recommend patient restart her seizure medications I have obtained a Keppra level for follow-up asked patient to return to the ER for any worsened symptoms or new symptoms or concerns seizure precautions no driving no swimming no working at heights or other dangerous activities until cleared by Neurology                        Clinical Impression:   Final diagnoses:  [G40.909] Seizure disorder (Primary)        ED Disposition Condition    Discharge Stable          ED Prescriptions    None       Follow-up Information       Follow up With Specialties Details Why Contact Info    Bunny Cano MD Vascular Neurology, Neurology Call in 1 day for re-examination of your symptoms [address] [facility] [address]  [phone]               Omero Mcmillan MD  05/28/23 0766

## 2023-06-01 LAB — LEVETIRACETAM SERPL-MCNC: 4.9 UG/ML (ref 10–40)

## 2023-08-23 ENCOUNTER — HOSPITAL ENCOUNTER (EMERGENCY)
Facility: HOSPITAL | Age: 28
Discharge: HOME | End: 2023-08-23
Attending: EMERGENCY MEDICINE
Payer: MEDICAID

## 2023-08-23 VITALS
HEART RATE: 83 BPM | TEMPERATURE: 98 F | SYSTOLIC BLOOD PRESSURE: 134 MMHG | RESPIRATION RATE: 16 BRPM | WEIGHT: 230 LBS | HEIGHT: 63 IN | OXYGEN SATURATION: 99 % | DIASTOLIC BLOOD PRESSURE: 90 MMHG | BODY MASS INDEX: 40.75 KG/M2

## 2023-08-23 DIAGNOSIS — K02.9 DENTAL CARIES: ICD-10-CM

## 2023-08-23 DIAGNOSIS — K08.89 PAIN, DENTAL: Primary | ICD-10-CM

## 2023-08-23 PROCEDURE — 99283 EMERGENCY DEPT VISIT LOW MDM: CPT

## 2023-08-23 RX ORDER — AMOXICILLIN 500 MG/1
500 CAPSULE ORAL 3 TIMES DAILY
Qty: 21 CAPSULE | Refills: 0 | Status: SHIPPED | OUTPATIENT
Start: 2023-08-23 | End: 2023-08-30

## 2023-08-23 NOTE — DISCHARGE INSTRUCTIONS
Warm salt water gargles   Take antibiotics as directed until all gone   Follow-up as directed  Return for any concerns

## 2023-08-23 NOTE — ED PROVIDER NOTES
Encounter Date: 8/23/2023       History     Chief Complaint   Patient presents with    Dental Problem     APPT ON SEPT 1     28-year-old well-appearing female presents to the emergency department with complaint of dental pain.  Patient reports that she has a history of epilepsy she states that she had a seizure and thinks that she may have cracked a tooth.  Patient has no obvious facial swelling or mandibular swelling.  She denies any associated fevers      Review of patient's allergies indicates:   Allergen Reactions    Benadryl [diphenhydramine hcl] Other (See Comments)     Lowers seizure threshold.    Quinolones Other (See Comments)     Lowers seizure threshold.     Tramadol Other (See Comments)     seizures     Past Medical History:   Diagnosis Date    Asthma     Depression affecting pregnancy     IUD (intrauterine device) in place     placed today 09/10/18    Mental disorder     Seizures     Epilepsy     Past Surgical History:   Procedure Laterality Date    ADENOIDECTOMY W/ MYRINGOTOMY AND TUBES      CHOLECYSTECTOMY       Family History   Problem Relation Age of Onset    Heart disease Father     Heart disease Maternal Grandfather     Heart disease Paternal Grandmother     Heart disease Paternal Grandfather     Congenital heart disease Neg Hx     Cardiomyopathy Neg Hx     Arrhythmia Neg Hx     Hypertension Neg Hx     Pacemaker/defibrilator Neg Hx      Social History     Tobacco Use    Smoking status: Never    Smokeless tobacco: Never   Substance Use Topics    Alcohol use: Yes     Comment: occasionally    Drug use: No     Review of Systems   Constitutional:  Negative for fever.   HENT:  Positive for dental problem.    Respiratory: Negative.     Cardiovascular: Negative.    Gastrointestinal: Negative.    Genitourinary: Negative.    Musculoskeletal: Negative.    Neurological: Negative.    Hematological: Negative.    Psychiatric/Behavioral: Negative.     All other systems reviewed and are negative.      Physical  Exam     Initial Vitals [08/23/23 1001]   BP Pulse Resp Temp SpO2   (!) 134/90 83 16 98 °F (36.7 °C) 99 %      MAP       --         Physical Exam    Nursing note and vitals reviewed.  Constitutional: She appears well-developed and well-nourished.   HENT:   Head: Normocephalic and atraumatic.   Mouth/Throat: Dental caries present.               ED Course   Procedures  Labs Reviewed - No data to display       Imaging Results    None          Medications - No data to display  Medical Decision Making  28-year-old well-appearing female presents to the emergency department with complaint of dental pain.  Patient reports that she has a history of epilepsy she states that she had a seizure and thinks that she may have cracked a tooth.  Patient has no obvious facial swelling or mandibular swelling.  She denies any associated fevers    Considerations include dental caries, dental abscess    28-year-old well-appearing female presents emergency department reports that she recently had a seizure and broke her tooth.  On previous review of medical records patient has had a broken tooth for quite some time she states she has not seen a dentist.  Patient has no obvious mandibular swelling or facial erythema she is had no fevers in his afebrile here she is handling her secretions well patient does indeed have a broken tooth to the right lower molar.  For this reason I will place the patient on amoxicillin.  Patient will be instructed take over-the-counter medications for relief of pain and to follow up with a dentist for definitive care.    Amount and/or Complexity of Data Reviewed  External Data Reviewed: notes.                               Clinical Impression:   Final diagnoses:  [K08.89] Pain, dental (Primary)  [K02.9] Dental caries        ED Disposition Condition    Discharge Stable          ED Prescriptions       Medication Sig Dispense Start Date End Date Auth. Provider    amoxicillin (AMOXIL) 500 MG capsule Take 1 capsule  (500 mg total) by mouth 3 (three) times daily. for 7 days 21 capsule 8/23/2023 8/30/2023 Guadalupe Gann FNP          Follow-up Information       Follow up With Specialties Details Why Contact Info    [facility]  Schedule an appointment as soon as possible for a visit in 2 days  [address]  [phone]             Guadalupe Gann FNP  08/23/23 9074

## 2023-09-12 DIAGNOSIS — G40.409 OTHER GENERALIZED EPILEPSY, NOT INTRACTABLE, WITHOUT STATUS EPILEPTICUS: Primary | ICD-10-CM

## 2023-09-20 ENCOUNTER — HOSPITAL ENCOUNTER (OUTPATIENT)
Dept: RADIOLOGY | Facility: HOSPITAL | Age: 28
Discharge: HOME | End: 2023-09-20
Attending: NURSE PRACTITIONER
Payer: MEDICAID

## 2023-09-20 DIAGNOSIS — G40.409 OTHER GENERALIZED EPILEPSY, NOT INTRACTABLE, WITHOUT STATUS EPILEPTICUS: ICD-10-CM

## 2023-09-20 PROCEDURE — 70551 MRI BRAIN STEM W/O DYE: CPT | Mod: TC,PO

## 2024-05-19 ENCOUNTER — HOSPITAL ENCOUNTER (EMERGENCY)
Facility: HOSPITAL | Age: 29
Discharge: HOME | End: 2024-05-19
Attending: EMERGENCY MEDICINE
Payer: MEDICAID

## 2024-05-19 VITALS
HEIGHT: 63 IN | WEIGHT: 230 LBS | BODY MASS INDEX: 40.75 KG/M2 | DIASTOLIC BLOOD PRESSURE: 84 MMHG | RESPIRATION RATE: 18 BRPM | HEART RATE: 85 BPM | SYSTOLIC BLOOD PRESSURE: 148 MMHG | TEMPERATURE: 98 F | OXYGEN SATURATION: 97 %

## 2024-05-19 DIAGNOSIS — M54.31 SCIATICA, RIGHT SIDE: Primary | ICD-10-CM

## 2024-05-19 LAB
B-HCG UR QL: NEGATIVE
CTP QC/QA: YES

## 2024-05-19 PROCEDURE — 25000003 PHARM REV CODE 250: Performed by: EMERGENCY MEDICINE

## 2024-05-19 PROCEDURE — 25000003 PHARM REV CODE 250: Performed by: PHYSICIAN ASSISTANT

## 2024-05-19 PROCEDURE — 63600175 PHARM REV CODE 636 W HCPCS: Performed by: EMERGENCY MEDICINE

## 2024-05-19 PROCEDURE — 99284 EMERGENCY DEPT VISIT MOD MDM: CPT

## 2024-05-19 PROCEDURE — 81025 URINE PREGNANCY TEST: CPT | Performed by: EMERGENCY MEDICINE

## 2024-05-19 RX ORDER — METHOCARBAMOL 500 MG/1
1000 TABLET, FILM COATED ORAL
Status: COMPLETED | OUTPATIENT
Start: 2024-05-19 | End: 2024-05-19

## 2024-05-19 RX ORDER — DEXAMETHASONE 4 MG/1
8 TABLET ORAL
Status: COMPLETED | OUTPATIENT
Start: 2024-05-19 | End: 2024-05-19

## 2024-05-19 RX ORDER — OXYCODONE AND ACETAMINOPHEN 10; 325 MG/1; MG/1
1 TABLET ORAL EVERY 6 HOURS PRN
Qty: 12 TABLET | Refills: 0 | Status: SHIPPED | OUTPATIENT
Start: 2024-05-19

## 2024-05-19 RX ORDER — METHOCARBAMOL 500 MG/1
500 TABLET, FILM COATED ORAL
Status: COMPLETED | OUTPATIENT
Start: 2024-05-19 | End: 2024-05-19

## 2024-05-19 RX ORDER — OXYCODONE AND ACETAMINOPHEN 10; 325 MG/1; MG/1
1 TABLET ORAL
Status: COMPLETED | OUTPATIENT
Start: 2024-05-19 | End: 2024-05-19

## 2024-05-19 RX ORDER — METHOCARBAMOL 750 MG/1
1500 TABLET, FILM COATED ORAL 3 TIMES DAILY
Qty: 30 TABLET | Refills: 0 | Status: SHIPPED | OUTPATIENT
Start: 2024-05-19 | End: 2024-05-24

## 2024-05-19 RX ORDER — METHYLPREDNISOLONE 4 MG/1
TABLET ORAL
Qty: 1 EACH | Refills: 0 | Status: SHIPPED | OUTPATIENT
Start: 2024-05-19 | End: 2024-06-09

## 2024-05-19 RX ORDER — MELOXICAM 7.5 MG/1
15 TABLET ORAL
Status: COMPLETED | OUTPATIENT
Start: 2024-05-19 | End: 2024-05-19

## 2024-05-19 RX ADMIN — METHOCARBAMOL 500 MG: 500 TABLET ORAL at 03:05

## 2024-05-19 RX ADMIN — DEXAMETHASONE 8 MG: 4 TABLET ORAL at 04:05

## 2024-05-19 RX ADMIN — MELOXICAM 15 MG: 7.5 TABLET ORAL at 04:05

## 2024-05-19 RX ADMIN — OXYCODONE HYDROCHLORIDE AND ACETAMINOPHEN 1 TABLET: 10; 325 TABLET ORAL at 04:05

## 2024-05-19 RX ADMIN — METHOCARBAMOL 1000 MG: 500 TABLET ORAL at 04:05

## 2024-05-19 NOTE — FIRST PROVIDER EVALUATION
Emergency Department TeleTriage Encounter Note      CHIEF COMPLAINT    Chief Complaint   Patient presents with    Back Pain     X 3 days        VITAL SIGNS   Initial Vitals [05/19/24 1517]   BP Pulse Resp Temp SpO2   (!) 167/91 109 18 97.5 °F (36.4 °C) 98 %      MAP       --            ALLERGIES    Review of patient's allergies indicates:   Allergen Reactions    Benadryl [diphenhydramine hcl] Other (See Comments)     Lowers seizure threshold.    Quinolones Other (See Comments)     Lowers seizure threshold.     Tramadol Other (See Comments)     seizures       PROVIDER TRIAGE NOTE  Patient presents with low back pain x3 days. Today she bent over and had an increased in pain. No radicular pain, numbness, focal weakness, or incontinence. She has taken ibuprofen without relief      ORDERS  Labs Reviewed - No data to display    ED Orders (720h ago, onward)      None              Virtual Visit Note: The provider triage portion of this emergency department evaluation and documentation was performed via [employer], a HIPAA-compliant telemedicine application, in concert with a tele-presenter in the room. A face to face patient evaluation with one of my colleagues will occur once the patient is placed in an emergency department room.      DISCLAIMER: This note was prepared with [email or web address] voice recognition transcription software. Garbled syntax, mangled pronouns, and other bizarre constructions may be attributed to that software system.

## 2024-05-19 NOTE — ED PROVIDER NOTES
Encounter Date: 5/19/2024       History     Chief Complaint   Patient presents with    Back Pain     X 3 days      HPI 29-year-old woman with a history of right-sided sciatica presents emergency department for evaluation of acute onset of right lower back pain radiating down her right leg for the past 3 days.  Pain became significantly worse this morning when she tried to get up from the toilet.  No relief with ibuprofen at home.  Denies any numbness, weakness, urinary or bowel difficulties.  No fever.  Review of patient's allergies indicates:   Allergen Reactions    Benadryl [diphenhydramine hcl] Other (See Comments)     Lowers seizure threshold.    Quinolones Other (See Comments)     Lowers seizure threshold.     Tramadol Other (See Comments)     seizures     Past Medical History:   Diagnosis Date    Asthma     Depression affecting pregnancy     IUD (intrauterine device) in place     placed today 09/10/18    Mental disorder     Seizures     Epilepsy     Past Surgical History:   Procedure Laterality Date    ADENOIDECTOMY W/ MYRINGOTOMY AND TUBES      CHOLECYSTECTOMY       Family History   Problem Relation Name Age of Onset    Heart disease Father      Heart disease Maternal Grandfather      Heart disease Paternal Grandmother      Heart disease Paternal Grandfather      Congenital heart disease Neg Hx      Cardiomyopathy Neg Hx      Arrhythmia Neg Hx      Hypertension Neg Hx      Pacemaker/defibrilator Neg Hx       Social History     Tobacco Use    Smoking status: Never    Smokeless tobacco: Never   Substance Use Topics    Alcohol use: Yes     Comment: occasionally    Drug use: No     Review of Systems   Constitutional:  Negative for fever.   HENT:  Negative for sore throat.    Respiratory:  Negative for shortness of breath.    Cardiovascular:  Negative for chest pain.   Gastrointestinal:  Negative for nausea.   Genitourinary:  Negative for dysuria.   Musculoskeletal:  Positive for back pain and gait problem.    Skin:  Negative for rash.   Neurological:  Negative for weakness.   Hematological:  Does not bruise/bleed easily.       Physical Exam     Initial Vitals [05/19/24 1517]   BP Pulse Resp Temp SpO2   (!) 167/91 109 18 97.5 °F (36.4 °C) 98 %      MAP       --         Physical Exam    Constitutional: Vital signs are normal. She appears well-developed and well-nourished.  Non-toxic appearance. No distress.   HENT:   Head: Normocephalic and atraumatic.   Eyes: EOM are normal. Pupils are equal, round, and reactive to light.   Neck: Neck supple. No JVD present.   Normal range of motion.  Cardiovascular:  Normal rate, regular rhythm, normal heart sounds and intact distal pulses.     Exam reveals no gallop and no friction rub.       No murmur heard.  Pulmonary/Chest: Breath sounds normal. She has no wheezes. She has no rhonchi. She has no rales.   Abdominal: Abdomen is soft. Bowel sounds are normal. There is no abdominal tenderness. There is no rebound and no guarding.   Musculoskeletal:      Cervical back: Normal range of motion and neck supple. No rigidity.      Lumbar back: Spasms and tenderness present. No bony tenderness. Positive right straight leg raise test.        Back:       Comments: 5/5 strength on flexion of EHL bilaterally.     Neurological: She is alert and oriented to person, place, and time. She has normal strength and normal reflexes. No cranial nerve deficit or sensory deficit. She exhibits normal muscle tone. Coordination normal. GCS eye subscore is 4. GCS verbal subscore is 5. GCS motor subscore is 6.   Skin: Skin is warm and dry.   Psychiatric: She has a normal mood and affect. Her speech is normal and behavior is normal. She is not actively hallucinating.         ED Course   Procedures  Labs Reviewed   POCT URINE PREGNANCY          Imaging Results    None          Medications   methocarbamoL tablet 500 mg (500 mg Oral Given 5/19/24 6979)   oxyCODONE-acetaminophen  mg per tablet 1 tablet (1 tablet  Oral Given 5/19/24 1632)   meloxicam tablet 15 mg (15 mg Oral Given 5/19/24 1632)   methocarbamoL tablet 1,000 mg (1,000 mg Oral Given 5/19/24 1632)   dexAMETHasone tablet 8 mg (8 mg Oral Given 5/19/24 1632)     Medical Decision Making  29-year-old woman with right low back pain radiating down her right leg with a positive right straight leg raise and normal neuro exam.  Differential diagnosis includes sciatica.  I doubt she has underlying fracture or dislocated vertebrae needing imaging.  There was no trauma.  She is afebrile, I doubt epidural or spinal abscess.  Do not think she has significant nerve root compression with myelopathy requiring emergent decompression.  Patient treated with steroids, NSAIDs, Percocet and muscle relaxants with significant improvement.  Patient was able to ambulate afterwards.  Will prescribe similar medications for treatment at home and referral to physical medicine rehab for further treatment.    Amount and/or Complexity of Data Reviewed  Labs: ordered.    Risk  Prescription drug management.                                      Clinical Impression:  Final diagnoses:  [M54.31] Sciatica, right side (Primary)          ED Disposition Condition    Discharge Stable          ED Prescriptions       Medication Sig Dispense Start Date End Date Auth. Provider    methocarbamoL (ROBAXIN) 750 MG Tab Take 2 tablets (1,500 mg total) by mouth 3 (three) times daily. for 5 days 30 tablet 5/19/2024 5/24/2024 Ha Bravo MD    oxyCODONE-acetaminophen (PERCOCET)  mg per tablet Take 1 tablet by mouth every 6 (six) hours as needed for Pain. 12 tablet 5/19/2024 -- Ha Bravo MD    methylPREDNISolone (MEDROL DOSEPACK) 4 mg tablet Take his directed on package 1 each 5/19/2024 6/9/2024 Ha Bravo MD          Follow-up Information       Follow up With Specialties Details Why Contact Info Additional Information    [facility] -  Emergency Medicine  As needed, If  symptoms worsen [address] [place] [address] [facility] [id number] 1st floor             Ha Bravo MD  05/19/24 0222

## 2025-01-07 LAB
ABO + RH BLD: NORMAL
ANTIBODY SCREEN: NEGATIVE
HBV SURFACE AG SERPL QL IA: NEGATIVE
HCV AB SERPL QL IA: NON REACTIVE
HIV 1+2 AB+HIV1 P24 AG SERPL QL IA: NON REACTIVE
RPR: NON REACTIVE
RUBELLA IMMUNE STATUS: NORMAL

## 2025-02-17 LAB
C TRACH RRNA SPEC QL PROBE: NEGATIVE
MARIJUANA (THC) METABOLITE: NORMAL
N GONORRHOEAE, AMPLIFIED DNA: NEGATIVE

## 2025-07-15 ENCOUNTER — HOSPITAL ENCOUNTER (OUTPATIENT)
Facility: HOSPITAL | Age: 30
Discharge: HOME | End: 2025-07-15
Attending: SPECIALIST | Admitting: SPECIALIST
Payer: MEDICARE

## 2025-07-15 VITALS — BODY MASS INDEX: 42.88 KG/M2 | TEMPERATURE: 98 F | RESPIRATION RATE: 20 BRPM | HEIGHT: 63 IN | WEIGHT: 242 LBS

## 2025-07-15 DIAGNOSIS — Z34.90 PREGNANCY: ICD-10-CM

## 2025-07-15 LAB
BACTERIA #/AREA URNS AUTO: ABNORMAL /HPF
BILIRUB UR QL STRIP.AUTO: NEGATIVE
CLARITY UR: ABNORMAL
COLOR UR AUTO: YELLOW
GLUCOSE UR QL STRIP: NEGATIVE
HGB UR QL STRIP: NEGATIVE
KETONES UR QL STRIP: ABNORMAL
LEUKOCYTE ESTERASE UR QL STRIP: ABNORMAL
MICROSCOPIC COMMENT: ABNORMAL
NITRITE UR QL STRIP: NEGATIVE
PH UR STRIP: 6 [PH]
PROT UR QL STRIP: ABNORMAL
RBC #/AREA URNS AUTO: 6 /HPF
SP GR UR STRIP: >=1.03
SQUAMOUS #/AREA URNS AUTO: 31 /HPF
UROBILINOGEN UR STRIP-ACNC: ABNORMAL EU/DL
WBC #/AREA URNS AUTO: 14 /HPF

## 2025-07-15 PROCEDURE — 59025 FETAL NON-STRESS TEST: CPT

## 2025-07-15 PROCEDURE — 99211 OFF/OP EST MAY X REQ PHY/QHP: CPT

## 2025-07-15 PROCEDURE — 87086 URINE CULTURE/COLONY COUNT: CPT | Performed by: SPECIALIST

## 2025-07-15 PROCEDURE — 81001 URINALYSIS AUTO W/SCOPE: CPT | Performed by: SPECIALIST

## 2025-07-15 NOTE — DISCHARGE INSTRUCTIONS
Keep your scheduled appointment with your provider.    Call your Doctor if you have any of the following:  Temperature above 100 degrees  Nausea, vomiting and/or diarrhea  Severe headache, dizziness, or blurred vision  Notable increase in swelling of hands or feet  Notable swelling of face and lips  Difficulty, pain or burning with urination  Foul smelling vaginal discharge  Decreased fetal movement    Come to the hospital if you have any of the following symptoms:  Your water breaks  More than 4-6 contractions in 1 hour for 2 or more hours  Vaginal bleeding like a period    After 28 weeks, you should feel 10 distinct fetal movements within a 2 hour period.    It is recommended that you drink 1/2 a gallon of water each day.  Tea, Soda and Juice are  in addition to this.    Labor and Delivery Phone number: [phone]    If you need to be seen on Labor and delivery between the hours of 6pm and 7am, please enter through the Emergency room entrance.

## 2025-07-15 NOTE — NURSING
[facility]  Department of Obstetrics and Gynecology  Labor & Delivery Triage Assessment    PATIENT NAME: Syeda Gonzalez  MRN: [id number]  TODAY'S DATE: 07/15/2025    CHIEF COMPLAINT: Abdominal Pain and Pelvic Pain (Lower pelvic pressure with activity or movement. )      OB History    Para Term  AB Living   4 2 2 0 1 2   SAB IAB Ectopic Multiple Live Births   1 0 0 0 2      # Outcome Date GA Lbr Chris/2nd Weight Sex Type Anes PTL Lv   4 Current            3 SAB 2019           2 Term 2018    M Vag-Spont   FLORA   1 Term 2013    M Vag-Spont   FLORA      Birth Comments: System Generated. Please review and update pregnancy details.     Past Medical History:   Diagnosis Date    Asthma     Depression affecting pregnancy     IUD (intrauterine device) in place     placed today 09/10/18    Mental disorder     Seizures     Epilepsy     Past Surgical History:   Procedure Laterality Date    ADENOIDECTOMY W/ MYRINGOTOMY AND TUBES      CHOLECYSTECTOMY           VITAL SIGNS - ABNORMAL VITALS INCLUDE TEMP >100.4,RR <12 or >26, SUSTAINED MATERNAL PULSE <60 or >120     VITAL SIGNS (Most Recent)  Temp: 98.3 °F (36.8 °C) (07/15/25 1629)  Resp: 20 (07/15/25 1629)    VITAL SIGNS     normal  HEADACHE    no     VOMITING    no  VISUAL DISTURBANCES  no  EPIGASTRIC PAIN        no  PROTEINURIA 2+ or MORE             no   EDEMA FACE/EXTREMITIES            no    FETAL MOVEMENT     FETAL MOVEMENT: Active  FETAL HEART RATE BASELINE =  145  normal  FETAL HEART RATE VARIABILITY:  Moderate  FETAL HEART RATE ACCELERATIONS FOR GESTATIONAL AGE: present  FETAL HEART RATE DECELERATIONS: none    ABDOMINAL PAIN/CRAMPING/CONTRACTIONS     Patient is complaining of abdominal pain/cramping/contractions. For  patients  5  contractions/hour. Contraction strength is mild. Resting tone is relaxed. Abdominal palpation is non-tender.    RUPTURE OF MEMBRANES OR LEAKING OF AMNIOTIC FLUID     Patient denies ROM or leaking of amniotic  fluid.    VAGINAL BLEEDING     Patient denies vaginal bleeding.    VAGINAL EXAM     DILATION:  0   STATION:  -5  EFFACEMENT:  0  PRESENTATION:  vtx    VAGINAL EXAM DEFERRED DUE TO:  n/a    PAIN PRESENT ON ARRIVAL     ONSET:   2 days  LOCATION:  pelvis  PAIN SCALE (0-10):  9 with activity  DESCRIPTION: pressure    Interventions     None.      PATIENT DISPOSITION     Discharged Home      Dr. Veliz notified at 1705 of the above assessment.    Sarita Whitman RN  [facility] hospital  07/15/2025

## 2025-07-15 NOTE — NURSING
1635 to labor and delivery with c/o intermittent tightening of uterus for the last few days with severe lower abdominal pressure with any activity.  Relieved with rest.  Pt reports she has had difficulty with pelvic pain and pressure during this pregnancy.  Has pregnancy belt, has not been wearing. Also reporting she feels urge to urinate every 2 hours.  Denies LOF or vaginal bleeding, has active fetal movement.  Placed on external monitors and UA sent.     1704 Report given to Dr. Veliz. New orders for SVE.  May d/c home after UA results.      1737 Reviewed all discharge instructions including increasing oral hydration, relief of lower pelvic discomfort with wearing pregnancy belt and labor precautions.  Pt voiced understanding.  Discharged home at this time.

## 2025-07-17 LAB — BACTERIA UR CULT: NORMAL

## 2025-08-01 LAB — PRENATAL STREP B CULTURE: NEGATIVE

## 2025-08-16 ENCOUNTER — HOSPITAL ENCOUNTER (OUTPATIENT)
Facility: HOSPITAL | Age: 30
Discharge: HOME | End: 2025-08-16
Attending: SPECIALIST | Admitting: SPECIALIST
Payer: MEDICARE

## 2025-08-16 VITALS
BODY MASS INDEX: 43.05 KG/M2 | OXYGEN SATURATION: 99 % | TEMPERATURE: 99 F | DIASTOLIC BLOOD PRESSURE: 94 MMHG | RESPIRATION RATE: 18 BRPM | HEIGHT: 63 IN | WEIGHT: 243 LBS | SYSTOLIC BLOOD PRESSURE: 141 MMHG

## 2025-08-16 DIAGNOSIS — N89.8 DISCHARGE FROM THE VAGINA: ICD-10-CM

## 2025-08-16 LAB
CTP QC/QA: YES
POC PH, VAGINAL: NEGATIVE (ref 4.5–5.5)
RUPTURE OF MEMBRANE: NEGATIVE

## 2025-08-16 PROCEDURE — 99211 OFF/OP EST MAY X REQ PHY/QHP: CPT

## 2025-08-16 PROCEDURE — 84112 EVAL AMNIOTIC FLUID PROTEIN: CPT

## 2025-08-20 ENCOUNTER — HOSPITAL ENCOUNTER (INPATIENT)
Facility: HOSPITAL | Age: 30
LOS: 1 days | Discharge: HOME | End: 2025-08-21
Attending: SPECIALIST | Admitting: SPECIALIST
Payer: MEDICARE

## 2025-08-20 ENCOUNTER — ANESTHESIA (OUTPATIENT)
Dept: OBSTETRICS AND GYNECOLOGY | Facility: HOSPITAL | Age: 30
End: 2025-08-20
Payer: MEDICARE

## 2025-08-20 ENCOUNTER — ANESTHESIA EVENT (OUTPATIENT)
Dept: OBSTETRICS AND GYNECOLOGY | Facility: HOSPITAL | Age: 30
End: 2025-08-20
Payer: MEDICARE

## 2025-08-20 DIAGNOSIS — Z34.90 ENCOUNTER FOR INDUCTION OF LABOR: ICD-10-CM

## 2025-08-20 DIAGNOSIS — Z34.90 ENCOUNTER FOR ELECTIVE INDUCTION OF LABOR: Primary | ICD-10-CM

## 2025-08-20 LAB
ABORH RETYPE: NORMAL
ABSOLUTE EOSINOPHIL (SMH): 0.12 K/UL
ABSOLUTE MONOCYTE (SMH): 0.98 K/UL (ref 0.3–1)
ABSOLUTE NEUTROPHIL COUNT (SMH): 8.2 K/UL (ref 1.8–7.7)
AMPHET UR QL SCN: NEGATIVE
BACTERIA #/AREA URNS AUTO: ABNORMAL /HPF
BARBITURATE SCN PRESENT UR: NEGATIVE
BASOPHILS # BLD AUTO: 0.06 K/UL
BASOPHILS NFR BLD AUTO: 0.5 %
BENZODIAZ UR QL SCN: NEGATIVE
BILIRUB UR QL STRIP.AUTO: NEGATIVE
BUPRENORPHINE UR QL SCN: NEGATIVE
CANNABINOIDS UR QL SCN: NEGATIVE
CLARITY UR: ABNORMAL
COCAINE UR QL SCN: NEGATIVE
COLOR UR AUTO: YELLOW
CREAT UR-MCNC: 60.7 MG/DL (ref 15–325)
CREAT UR-MCNC: 61.3 MG/DL (ref 15–325)
CREAT UR-MCNC: 61.3 MG/DL (ref 15–325)
ERYTHROCYTE [DISTWIDTH] IN BLOOD BY AUTOMATED COUNT: 12.6 % (ref 11.5–14.5)
FENTANYL UR QL SCN: NEGATIVE
GLUCOSE UR QL STRIP: NEGATIVE
GROUP & RH: NORMAL
HCT VFR BLD AUTO: 30.7 % (ref 37–48.5)
HGB BLD-MCNC: 10.2 GM/DL (ref 12–16)
HGB UR QL STRIP: NEGATIVE
HIV1+2 IGG SERPL QL IA.RAPID: NEGATIVE
HYALINE CASTS UR QL AUTO: 4 /LPF (ref 0–1)
IMM GRANULOCYTES # BLD AUTO: 0.14 K/UL (ref 0–0.04)
IMM GRANULOCYTES NFR BLD AUTO: 1.2 % (ref 0–0.5)
INDIRECT COOMBS: NORMAL
KETONES UR QL STRIP: NEGATIVE
LEUKOCYTE ESTERASE UR QL STRIP: ABNORMAL
LYMPHOCYTES # BLD AUTO: 2.45 K/UL (ref 1–4.8)
MCH RBC QN AUTO: 28.9 PG (ref 27–31)
MCHC RBC AUTO-ENTMCNC: 33.2 G/DL (ref 32–36)
MCV RBC AUTO: 87 FL (ref 82–98)
MICROSCOPIC COMMENT: ABNORMAL
NITRITE UR QL STRIP: NEGATIVE
NUCLEATED RBC (/100WBC) (SMH): 0 /100 WBC
OPIATES UR QL SCN: NEGATIVE
PCP UR QL: NEGATIVE
PH UR STRIP: 7 [PH]
PLATELET # BLD AUTO: 337 K/UL (ref 150–450)
PMV BLD AUTO: 10.6 FL (ref 9.2–12.9)
PROT UR QL STRIP: NEGATIVE
RBC # BLD AUTO: 3.53 M/UL (ref 4–5.4)
RBC #/AREA URNS AUTO: 10 /HPF
RELATIVE EOSINOPHIL (SMH): 1 % (ref 0–8)
RELATIVE LYMPHOCYTE (SMH): 20.5 % (ref 18–48)
RELATIVE MONOCYTE (SMH): 8.2 % (ref 4–15)
RELATIVE NEUTROPHIL (SMH): 68.6 % (ref 38–73)
SP GR UR STRIP: 1.01
SQUAMOUS #/AREA URNS AUTO: 22 /HPF
T PALLIDUM IGG+IGM SER QL: NORMAL
UROBILINOGEN UR STRIP-ACNC: NEGATIVE EU/DL
WBC # BLD AUTO: 11.98 K/UL (ref 3.9–12.7)
WBC #/AREA URNS AUTO: 48 /HPF

## 2025-08-20 PROCEDURE — 86850 RBC ANTIBODY SCREEN: CPT | Performed by: SPECIALIST

## 2025-08-20 PROCEDURE — 85025 COMPLETE CBC W/AUTO DIFF WBC: CPT | Performed by: SPECIALIST

## 2025-08-20 PROCEDURE — 80307 DRUG TEST PRSMV CHEM ANLYZR: CPT | Performed by: SPECIALIST

## 2025-08-20 PROCEDURE — 72100003 HC LABOR CARE, EA. ADDL. 8 HRS

## 2025-08-20 PROCEDURE — 87086 URINE CULTURE/COLONY COUNT: CPT | Performed by: SPECIALIST

## 2025-08-20 PROCEDURE — 51702 INSERT TEMP BLADDER CATH: CPT

## 2025-08-20 PROCEDURE — 11000001 HC ACUTE MED/SURG PRIVATE ROOM

## 2025-08-20 PROCEDURE — 27200710 HC EPIDURAL INFUSION PUMP SET: Performed by: ANESTHESIOLOGY

## 2025-08-20 PROCEDURE — 63600175 PHARM REV CODE 636 W HCPCS: Performed by: ANESTHESIOLOGY

## 2025-08-20 PROCEDURE — 36415 COLL VENOUS BLD VENIPUNCTURE: CPT | Performed by: SPECIALIST

## 2025-08-20 PROCEDURE — 86593 SYPHILIS TEST NON-TREP QUANT: CPT | Performed by: SPECIALIST

## 2025-08-20 PROCEDURE — 10907ZC DRAINAGE OF AMNIOTIC FLUID, THERAPEUTIC FROM PRODUCTS OF CONCEPTION, VIA NATURAL OR ARTIFICIAL OPENING: ICD-10-PCS | Performed by: SPECIALIST

## 2025-08-20 PROCEDURE — 63600175 PHARM REV CODE 636 W HCPCS: Performed by: SPECIALIST

## 2025-08-20 PROCEDURE — 72100002 HC LABOR CARE, 1ST 8 HOURS

## 2025-08-20 PROCEDURE — C1751 CATH, INF, PER/CENT/MIDLINE: HCPCS | Performed by: ANESTHESIOLOGY

## 2025-08-20 PROCEDURE — 81003 URINALYSIS AUTO W/O SCOPE: CPT | Performed by: SPECIALIST

## 2025-08-20 PROCEDURE — 25000003 PHARM REV CODE 250: Performed by: SPECIALIST

## 2025-08-20 PROCEDURE — 72200005 HC VAGINAL DELIVERY LEVEL II

## 2025-08-20 PROCEDURE — 86703 HIV-1/HIV-2 1 RESULT ANTBDY: CPT | Performed by: SPECIALIST

## 2025-08-20 PROCEDURE — 3E033VJ INTRODUCTION OF OTHER HORMONE INTO PERIPHERAL VEIN, PERCUTANEOUS APPROACH: ICD-10-PCS | Performed by: SPECIALIST

## 2025-08-20 PROCEDURE — 62326 NJX INTERLAMINAR LMBR/SAC: CPT | Performed by: ANESTHESIOLOGY

## 2025-08-20 RX ORDER — TRANEXAMIC ACID 10 MG/ML
1000 INJECTION, SOLUTION INTRAVENOUS EVERY 30 MIN PRN
Status: DISCONTINUED | OUTPATIENT
Start: 2025-08-20 | End: 2025-08-21

## 2025-08-20 RX ORDER — BUTORPHANOL TARTRATE 2 MG/ML
1 INJECTION, SOLUTION INTRAMUSCULAR; INTRAVENOUS
Status: DISCONTINUED | OUTPATIENT
Start: 2025-08-20 | End: 2025-08-21

## 2025-08-20 RX ORDER — OXYTOCIN-SODIUM CHLORIDE 0.9% IV SOLN 30 UNIT/500ML 30-0.9/5 UT/ML-%
95 SOLUTION INTRAVENOUS ONCE AS NEEDED
Status: DISCONTINUED | OUTPATIENT
Start: 2025-08-20 | End: 2025-08-21

## 2025-08-20 RX ORDER — NALOXONE HCL 0.4 MG/ML
0.4 VIAL (ML) INJECTION SEE ADMIN INSTRUCTIONS
Status: DISCONTINUED | OUTPATIENT
Start: 2025-08-20 | End: 2025-08-21

## 2025-08-20 RX ORDER — CALCIUM CARBONATE 200(500)MG
500 TABLET,CHEWABLE ORAL 3 TIMES DAILY PRN
Status: DISCONTINUED | OUTPATIENT
Start: 2025-08-20 | End: 2025-08-21

## 2025-08-20 RX ORDER — OXYTOCIN 10 [USP'U]/ML
10 INJECTION, SOLUTION INTRAMUSCULAR; INTRAVENOUS ONCE AS NEEDED
Status: DISCONTINUED | OUTPATIENT
Start: 2025-08-20 | End: 2025-08-21

## 2025-08-20 RX ORDER — ROPIVACAINE HYDROCHLORIDE 2 MG/ML
INJECTION, SOLUTION EPIDURAL; INFILTRATION
Status: COMPLETED | OUTPATIENT
Start: 2025-08-20 | End: 2025-08-20

## 2025-08-20 RX ORDER — FENTANYL/BUPIVACAINE/NS/PF 2MCG/ML-.1
14 PLASTIC BAG, INJECTION (ML) INJECTION CONTINUOUS
Refills: 0 | Status: DISCONTINUED | OUTPATIENT
Start: 2025-08-20 | End: 2025-08-21

## 2025-08-20 RX ORDER — PROMETHAZINE HYDROCHLORIDE 25 MG/1
25 TABLET ORAL EVERY 6 HOURS PRN
Status: CANCELLED | OUTPATIENT
Start: 2025-08-20

## 2025-08-20 RX ORDER — ONDANSETRON HYDROCHLORIDE 2 MG/ML
4 INJECTION, SOLUTION INTRAVENOUS EVERY 6 HOURS PRN
Status: DISCONTINUED | OUTPATIENT
Start: 2025-08-20 | End: 2025-08-20

## 2025-08-20 RX ORDER — OXYTOCIN-SODIUM CHLORIDE 0.9% IV SOLN 30 UNIT/500ML 30-0.9/5 UT/ML-%
0-32 SOLUTION INTRAVENOUS CONTINUOUS
Status: DISCONTINUED | OUTPATIENT
Start: 2025-08-20 | End: 2025-08-21

## 2025-08-20 RX ORDER — ROPIVACAINE HYDROCHLORIDE 2 MG/ML
20 INJECTION, SOLUTION EPIDURAL; INFILTRATION ONCE
Status: DISCONTINUED | OUTPATIENT
Start: 2025-08-20 | End: 2025-08-21

## 2025-08-20 RX ORDER — ACETAMINOPHEN 500 MG
1000 TABLET ORAL ONCE AS NEEDED
Status: COMPLETED | OUTPATIENT
Start: 2025-08-20 | End: 2025-08-20

## 2025-08-20 RX ORDER — DIPHENOXYLATE HYDROCHLORIDE AND ATROPINE SULFATE 2.5; .025 MG/1; MG/1
2 TABLET ORAL EVERY 6 HOURS PRN
Status: DISCONTINUED | OUTPATIENT
Start: 2025-08-20 | End: 2025-08-21

## 2025-08-20 RX ORDER — OXYTOCIN-SODIUM CHLORIDE 0.9% IV SOLN 30 UNIT/500ML 30-0.9/5 UT/ML-%
10 SOLUTION INTRAVENOUS ONCE AS NEEDED
Status: DISCONTINUED | OUTPATIENT
Start: 2025-08-20 | End: 2025-08-21

## 2025-08-20 RX ORDER — BUTORPHANOL TARTRATE 2 MG/ML
2 INJECTION, SOLUTION INTRAMUSCULAR; INTRAVENOUS
Status: DISCONTINUED | OUTPATIENT
Start: 2025-08-20 | End: 2025-08-21

## 2025-08-20 RX ORDER — EPHEDRINE SULFATE 50 MG/ML
10 INJECTION, SOLUTION INTRAVENOUS ONCE AS NEEDED
Status: DISCONTINUED | OUTPATIENT
Start: 2025-08-20 | End: 2025-08-20

## 2025-08-20 RX ORDER — MISOPROSTOL 200 UG/1
800 TABLET ORAL ONCE AS NEEDED
Status: DISCONTINUED | OUTPATIENT
Start: 2025-08-20 | End: 2025-08-21

## 2025-08-20 RX ORDER — DIPHENHYDRAMINE HCL 25 MG
25 CAPSULE ORAL EVERY 4 HOURS PRN
Status: CANCELLED | OUTPATIENT
Start: 2025-08-20

## 2025-08-20 RX ORDER — FENTANYL/BUPIVACAINE/NS/PF 2MCG/ML-.1
10 PLASTIC BAG, INJECTION (ML) INJECTION CONTINUOUS
Status: DISCONTINUED | OUTPATIENT
Start: 2025-08-20 | End: 2025-08-21

## 2025-08-20 RX ORDER — ONDANSETRON HYDROCHLORIDE 2 MG/ML
4 INJECTION, SOLUTION INTRAVENOUS EVERY 6 HOURS PRN
Status: DISCONTINUED | OUTPATIENT
Start: 2025-08-20 | End: 2025-08-21

## 2025-08-20 RX ORDER — OXYCODONE AND ACETAMINOPHEN 10; 325 MG/1; MG/1
1 TABLET ORAL EVERY 4 HOURS PRN
Refills: 0 | Status: CANCELLED | OUTPATIENT
Start: 2025-08-20

## 2025-08-20 RX ORDER — OXYCODONE AND ACETAMINOPHEN 5; 325 MG/1; MG/1
1 TABLET ORAL EVERY 4 HOURS PRN
Refills: 0 | Status: CANCELLED | OUTPATIENT
Start: 2025-08-20

## 2025-08-20 RX ORDER — ACETAMINOPHEN 500 MG
1000 TABLET ORAL EVERY 6 HOURS PRN
Status: DISCONTINUED | OUTPATIENT
Start: 2025-08-20 | End: 2025-08-22 | Stop reason: HOSPADM

## 2025-08-20 RX ORDER — DIPHENHYDRAMINE HYDROCHLORIDE 50 MG/ML
25 INJECTION, SOLUTION INTRAMUSCULAR; INTRAVENOUS EVERY 4 HOURS PRN
Status: CANCELLED | OUTPATIENT
Start: 2025-08-20

## 2025-08-20 RX ORDER — SODIUM CHLORIDE, SODIUM LACTATE, POTASSIUM CHLORIDE, CALCIUM CHLORIDE 600; 310; 30; 20 MG/100ML; MG/100ML; MG/100ML; MG/100ML
INJECTION, SOLUTION INTRAVENOUS CONTINUOUS
Status: DISCONTINUED | OUTPATIENT
Start: 2025-08-20 | End: 2025-08-21

## 2025-08-20 RX ORDER — CARBOPROST TROMETHAMINE 250 UG/ML
250 INJECTION, SOLUTION INTRAMUSCULAR
Status: DISCONTINUED | OUTPATIENT
Start: 2025-08-20 | End: 2025-08-21

## 2025-08-20 RX ORDER — EPHEDRINE SULFATE 50 MG/ML
10 INJECTION, SOLUTION INTRAVENOUS ONCE AS NEEDED
Status: DISCONTINUED | OUTPATIENT
Start: 2025-08-20 | End: 2025-08-21

## 2025-08-20 RX ORDER — OXYTOCIN-SODIUM CHLORIDE 0.9% IV SOLN 30 UNIT/500ML 30-0.9/5 UT/ML-%
95 SOLUTION INTRAVENOUS CONTINUOUS PRN
Status: DISCONTINUED | OUTPATIENT
Start: 2025-08-20 | End: 2025-08-21

## 2025-08-20 RX ORDER — METHYLERGONOVINE MALEATE 0.2 MG/ML
200 INJECTION INTRAVENOUS ONCE AS NEEDED
Status: DISCONTINUED | OUTPATIENT
Start: 2025-08-20 | End: 2025-08-21

## 2025-08-20 RX ORDER — FOLIC ACID 0.8 MG
800 TABLET ORAL DAILY
COMMUNITY

## 2025-08-20 RX ORDER — MISOPROSTOL 200 UG/1
800 TABLET ORAL ONCE AS NEEDED
Status: COMPLETED | OUTPATIENT
Start: 2025-08-20 | End: 2025-08-20

## 2025-08-20 RX ADMIN — ROPIVACAINE HYDROCHLORIDE 15 ML: 2 INJECTION, SOLUTION EPIDURAL; INFILTRATION at 03:08

## 2025-08-20 RX ADMIN — MISOPROSTOL 400 MCG: 200 TABLET ORAL at 08:08

## 2025-08-20 RX ADMIN — PROMETHAZINE HYDROCHLORIDE 12.5 MG: 25 INJECTION INTRAMUSCULAR; INTRAVENOUS at 11:08

## 2025-08-20 RX ADMIN — ACETAMINOPHEN 1000 MG: 500 TABLET ORAL at 08:08

## 2025-08-20 RX ADMIN — ONDANSETRON 4 MG: 2 INJECTION INTRAMUSCULAR; INTRAVENOUS at 08:08

## 2025-08-20 RX ADMIN — SODIUM CHLORIDE, SODIUM LACTATE, POTASSIUM CHLORIDE, AND CALCIUM CHLORIDE: .6; .31; .03; .02 INJECTION, SOLUTION INTRAVENOUS at 05:08

## 2025-08-20 RX ADMIN — ACETAMINOPHEN 1000 MG: 500 TABLET ORAL at 10:08

## 2025-08-20 RX ADMIN — BUTORPHANOL TARTRATE 2 MG: 2 INJECTION, SOLUTION INTRAMUSCULAR; INTRAVENOUS at 11:08

## 2025-08-20 RX ADMIN — Medication 2 MILLI-UNITS/MIN: at 06:08

## 2025-08-21 VITALS
HEART RATE: 85 BPM | WEIGHT: 243 LBS | SYSTOLIC BLOOD PRESSURE: 136 MMHG | TEMPERATURE: 98 F | BODY MASS INDEX: 44.72 KG/M2 | OXYGEN SATURATION: 98 % | DIASTOLIC BLOOD PRESSURE: 82 MMHG | HEIGHT: 62 IN | RESPIRATION RATE: 17 BRPM

## 2025-08-21 LAB
ABSOLUTE EOSINOPHIL (SMH): 0.03 K/UL
ABSOLUTE MONOCYTE (SMH): 1.76 K/UL (ref 0.3–1)
ABSOLUTE NEUTROPHIL COUNT (SMH): 13.4 K/UL (ref 1.8–7.7)
BACTERIA UR CULT: NORMAL
BASOPHILS # BLD AUTO: 0.07 K/UL
BASOPHILS NFR BLD AUTO: 0.4 %
ERYTHROCYTE [DISTWIDTH] IN BLOOD BY AUTOMATED COUNT: 12.6 % (ref 11.5–14.5)
HCT VFR BLD AUTO: 26.7 % (ref 37–48.5)
HGB BLD-MCNC: 8.8 GM/DL (ref 12–16)
IMM GRANULOCYTES # BLD AUTO: 0.14 K/UL (ref 0–0.04)
IMM GRANULOCYTES NFR BLD AUTO: 0.8 % (ref 0–0.5)
LYMPHOCYTES # BLD AUTO: 2.53 K/UL (ref 1–4.8)
MCH RBC QN AUTO: 28.8 PG (ref 27–31)
MCHC RBC AUTO-ENTMCNC: 33 G/DL (ref 32–36)
MCV RBC AUTO: 87 FL (ref 82–98)
NUCLEATED RBC (/100WBC) (SMH): 0 /100 WBC
PLATELET # BLD AUTO: 276 K/UL (ref 150–450)
PMV BLD AUTO: 10.6 FL (ref 9.2–12.9)
RBC # BLD AUTO: 3.06 M/UL (ref 4–5.4)
RELATIVE EOSINOPHIL (SMH): 0.2 % (ref 0–8)
RELATIVE LYMPHOCYTE (SMH): 14.1 % (ref 18–48)
RELATIVE MONOCYTE (SMH): 9.8 % (ref 4–15)
RELATIVE NEUTROPHIL (SMH): 74.7 % (ref 38–73)
WBC # BLD AUTO: 17.93 K/UL (ref 3.9–12.7)

## 2025-08-21 PROCEDURE — 36415 COLL VENOUS BLD VENIPUNCTURE: CPT | Performed by: SPECIALIST

## 2025-08-21 PROCEDURE — 25000003 PHARM REV CODE 250: Performed by: SPECIALIST

## 2025-08-21 PROCEDURE — 85025 COMPLETE CBC W/AUTO DIFF WBC: CPT | Performed by: SPECIALIST

## 2025-08-21 RX ORDER — LACOSAMIDE 200 MG/1
200 TABLET ORAL EVERY 12 HOURS
Status: DISCONTINUED | OUTPATIENT
Start: 2025-08-21 | End: 2025-08-22 | Stop reason: HOSPADM

## 2025-08-21 RX ORDER — MISOPROSTOL 200 UG/1
800 TABLET ORAL ONCE AS NEEDED
Status: DISCONTINUED | OUTPATIENT
Start: 2025-08-21 | End: 2025-08-22 | Stop reason: HOSPADM

## 2025-08-21 RX ORDER — TRANEXAMIC ACID 10 MG/ML
1000 INJECTION, SOLUTION INTRAVENOUS EVERY 30 MIN PRN
Status: DISCONTINUED | OUTPATIENT
Start: 2025-08-21 | End: 2025-08-22 | Stop reason: HOSPADM

## 2025-08-21 RX ORDER — ONDANSETRON 4 MG/1
8 TABLET, ORALLY DISINTEGRATING ORAL EVERY 8 HOURS PRN
Status: DISCONTINUED | OUTPATIENT
Start: 2025-08-21 | End: 2025-08-22 | Stop reason: HOSPADM

## 2025-08-21 RX ORDER — OXYTOCIN/0.9 % SODIUM CHLORIDE 15/250 ML
95 PLASTIC BAG, INJECTION (ML) INTRAVENOUS CONTINUOUS PRN
Status: DISCONTINUED | OUTPATIENT
Start: 2025-08-21 | End: 2025-08-22 | Stop reason: HOSPADM

## 2025-08-21 RX ORDER — CARBOPROST TROMETHAMINE 250 UG/ML
250 INJECTION, SOLUTION INTRAMUSCULAR
Status: DISCONTINUED | OUTPATIENT
Start: 2025-08-21 | End: 2025-08-22 | Stop reason: HOSPADM

## 2025-08-21 RX ORDER — OXYTOCIN-SODIUM CHLORIDE 0.9% IV SOLN 30 UNIT/500ML 30-0.9/5 UT/ML-%
20 SOLUTION INTRAVENOUS ONCE AS NEEDED
Status: DISCONTINUED | OUTPATIENT
Start: 2025-08-21 | End: 2025-08-22 | Stop reason: HOSPADM

## 2025-08-21 RX ORDER — OXYTOCIN/0.9 % SODIUM CHLORIDE 15/250 ML
95 PLASTIC BAG, INJECTION (ML) INTRAVENOUS ONCE AS NEEDED
Status: DISCONTINUED | OUTPATIENT
Start: 2025-08-21 | End: 2025-08-22 | Stop reason: HOSPADM

## 2025-08-21 RX ORDER — METHYLERGONOVINE MALEATE 0.2 MG/ML
200 INJECTION INTRAVENOUS ONCE AS NEEDED
Status: DISCONTINUED | OUTPATIENT
Start: 2025-08-21 | End: 2025-08-22 | Stop reason: HOSPADM

## 2025-08-21 RX ORDER — DIPHENOXYLATE HYDROCHLORIDE AND ATROPINE SULFATE 2.5; .025 MG/1; MG/1
2 TABLET ORAL EVERY 6 HOURS PRN
Status: DISCONTINUED | OUTPATIENT
Start: 2025-08-21 | End: 2025-08-22 | Stop reason: HOSPADM

## 2025-08-21 RX ORDER — HYDROCORTISONE 25 MG/G
CREAM TOPICAL 3 TIMES DAILY PRN
Status: DISCONTINUED | OUTPATIENT
Start: 2025-08-21 | End: 2025-08-22 | Stop reason: HOSPADM

## 2025-08-21 RX ORDER — DOCUSATE SODIUM 100 MG/1
200 CAPSULE, LIQUID FILLED ORAL 2 TIMES DAILY PRN
Status: DISCONTINUED | OUTPATIENT
Start: 2025-08-21 | End: 2025-08-22 | Stop reason: HOSPADM

## 2025-08-21 RX ORDER — OXYTOCIN 10 [USP'U]/ML
10 INJECTION, SOLUTION INTRAMUSCULAR; INTRAVENOUS ONCE AS NEEDED
Status: DISCONTINUED | OUTPATIENT
Start: 2025-08-21 | End: 2025-08-22 | Stop reason: HOSPADM

## 2025-08-21 RX ADMIN — LACOSAMIDE 200 MG: 100 TABLET, FILM COATED ORAL at 09:08

## 2025-08-21 RX ADMIN — LEVETIRACETAM 1750 MG: 1000 TABLET, FILM COATED ORAL at 09:08

## 2025-08-21 RX ADMIN — ACETAMINOPHEN 1000 MG: 500 TABLET ORAL at 02:08

## 2025-08-21 RX ADMIN — IBUPROFEN 600 MG: 400 TABLET ORAL at 02:08

## 2025-08-21 RX ADMIN — IBUPROFEN 600 MG: 400 TABLET ORAL at 03:08

## 2025-08-21 RX ADMIN — IBUPROFEN 600 MG: 400 TABLET ORAL at 07:08

## 2025-08-21 RX ADMIN — BENZOCAINE AND LEVOMENTHOL: 200; 5 SPRAY TOPICAL at 02:08

## 2025-08-21 RX ADMIN — ACETAMINOPHEN 1000 MG: 500 TABLET ORAL at 07:08

## 2025-08-25 PROBLEM — Z34.90 ENCOUNTER FOR INDUCTION OF LABOR: Status: RESOLVED | Noted: 2025-08-20 | Resolved: 2025-08-25
